# Patient Record
Sex: MALE | Race: BLACK OR AFRICAN AMERICAN | NOT HISPANIC OR LATINO
[De-identification: names, ages, dates, MRNs, and addresses within clinical notes are randomized per-mention and may not be internally consistent; named-entity substitution may affect disease eponyms.]

---

## 2017-01-31 ENCOUNTER — APPOINTMENT (OUTPATIENT)
Dept: FAMILY MEDICINE | Facility: CLINIC | Age: 37
End: 2017-01-31

## 2017-01-31 VITALS
TEMPERATURE: 98.5 F | HEART RATE: 121 BPM | HEIGHT: 76 IN | DIASTOLIC BLOOD PRESSURE: 76 MMHG | OXYGEN SATURATION: 98 % | RESPIRATION RATE: 12 BRPM | SYSTOLIC BLOOD PRESSURE: 132 MMHG

## 2017-02-11 ENCOUNTER — APPOINTMENT (OUTPATIENT)
Dept: FAMILY MEDICINE | Facility: CLINIC | Age: 37
End: 2017-02-11

## 2017-02-11 ENCOUNTER — LABORATORY RESULT (OUTPATIENT)
Age: 37
End: 2017-02-11

## 2017-02-11 VITALS
RESPIRATION RATE: 14 BRPM | DIASTOLIC BLOOD PRESSURE: 80 MMHG | HEART RATE: 100 BPM | OXYGEN SATURATION: 96 % | SYSTOLIC BLOOD PRESSURE: 132 MMHG | HEIGHT: 76 IN

## 2017-02-13 LAB
25(OH)D3 SERPL-MCNC: 7.4 NG/ML
ALBUMIN SERPL ELPH-MCNC: 4 G/DL
ALP BLD-CCNC: 93 U/L
ALT SERPL-CCNC: 27 U/L
ANION GAP SERPL CALC-SCNC: 14 MMOL/L
APPEARANCE: CLEAR
AST SERPL-CCNC: 16 U/L
BASOPHILS # BLD AUTO: 0.03 K/UL
BASOPHILS NFR BLD AUTO: 0.2 %
BILIRUB SERPL-MCNC: 0.3 MG/DL
BILIRUBIN URINE: NEGATIVE
BLOOD URINE: NEGATIVE
BUN SERPL-MCNC: 14 MG/DL
C PEPTIDE SERPL-MCNC: 4.5 NG/ML
CALCIUM SERPL-MCNC: 9.6 MG/DL
CHLORIDE SERPL-SCNC: 99 MMOL/L
CHOLEST SERPL-MCNC: 165 MG/DL
CHOLEST/HDLC SERPL: 4.7 RATIO
CO2 SERPL-SCNC: 27 MMOL/L
COLOR: YELLOW
CREAT SERPL-MCNC: 0.95 MG/DL
EOSINOPHIL # BLD AUTO: 0.08 K/UL
EOSINOPHIL NFR BLD AUTO: 0.7 %
GLUCOSE QUALITATIVE U: NORMAL MG/DL
GLUCOSE SERPL-MCNC: 96 MG/DL
HBA1C MFR BLD HPLC: 6.3 %
HCT VFR BLD CALC: 43.8 %
HDLC SERPL-MCNC: 35 MG/DL
HGB BLD-MCNC: 13.4 G/DL
IMM GRANULOCYTES NFR BLD AUTO: 0.6 %
KETONES URINE: NEGATIVE
LDLC SERPL CALC-MCNC: 115 MG/DL
LEUKOCYTE ESTERASE URINE: ABNORMAL
LYMPHOCYTES # BLD AUTO: 1.9 K/UL
LYMPHOCYTES NFR BLD AUTO: 15.8 %
MAN DIFF?: NORMAL
MCHC RBC-ENTMCNC: 24 PG
MCHC RBC-ENTMCNC: 30.6 GM/DL
MCV RBC AUTO: 78.4 FL
MONOCYTES # BLD AUTO: 0.58 K/UL
MONOCYTES NFR BLD AUTO: 4.8 %
NEUTROPHILS # BLD AUTO: 9.35 K/UL
NEUTROPHILS NFR BLD AUTO: 77.9 %
NITRITE URINE: NEGATIVE
PH URINE: 6.5
PLATELET # BLD AUTO: 312 K/UL
POTASSIUM SERPL-SCNC: 4.6 MMOL/L
PROT SERPL-MCNC: 8.1 G/DL
PROTEIN URINE: NEGATIVE MG/DL
PSA SERPL-MCNC: 0.2 NG/ML
RBC # BLD: 5.59 M/UL
RBC # FLD: 16.8 %
SODIUM SERPL-SCNC: 140 MMOL/L
SPECIFIC GRAVITY URINE: 1.02
T4 FREE SERPL-MCNC: 1 NG/DL
T4 SERPL-MCNC: 7.2 UG/DL
TRIGL SERPL-MCNC: 74 MG/DL
TSH SERPL-ACNC: 1.27 UIU/ML
UROBILINOGEN URINE: NORMAL MG/DL
WBC # FLD AUTO: 12.01 K/UL

## 2017-02-25 ENCOUNTER — APPOINTMENT (OUTPATIENT)
Dept: FAMILY MEDICINE | Facility: CLINIC | Age: 37
End: 2017-02-25

## 2017-02-25 VITALS
HEART RATE: 112 BPM | SYSTOLIC BLOOD PRESSURE: 130 MMHG | RESPIRATION RATE: 13 BRPM | DIASTOLIC BLOOD PRESSURE: 76 MMHG | HEIGHT: 76 IN | OXYGEN SATURATION: 97 %

## 2017-02-27 VITALS — BODY MASS INDEX: 47.35 KG/M2 | WEIGHT: 315 LBS

## 2017-04-24 ENCOUNTER — OUTPATIENT (OUTPATIENT)
Dept: OUTPATIENT SERVICES | Facility: HOSPITAL | Age: 37
LOS: 1 days | End: 2017-04-24
Payer: COMMERCIAL

## 2017-04-24 DIAGNOSIS — G47.33 OBSTRUCTIVE SLEEP APNEA (ADULT) (PEDIATRIC): ICD-10-CM

## 2017-04-24 DIAGNOSIS — G47.30 SLEEP APNEA, UNSPECIFIED: ICD-10-CM

## 2017-04-24 PROCEDURE — 95811 POLYSOM 6/>YRS CPAP 4/> PARM: CPT | Mod: 26

## 2017-04-24 PROCEDURE — 95811 POLYSOM 6/>YRS CPAP 4/> PARM: CPT

## 2017-06-17 ENCOUNTER — APPOINTMENT (OUTPATIENT)
Dept: FAMILY MEDICINE | Facility: CLINIC | Age: 37
End: 2017-06-17

## 2017-06-17 VITALS
HEART RATE: 107 BPM | HEIGHT: 76 IN | BODY MASS INDEX: 38.36 KG/M2 | OXYGEN SATURATION: 91 % | WEIGHT: 315 LBS | DIASTOLIC BLOOD PRESSURE: 76 MMHG | RESPIRATION RATE: 13 BRPM | SYSTOLIC BLOOD PRESSURE: 126 MMHG

## 2017-07-05 ENCOUNTER — APPOINTMENT (OUTPATIENT)
Dept: PULMONOLOGY | Facility: CLINIC | Age: 37
End: 2017-07-05

## 2017-07-06 ENCOUNTER — APPOINTMENT (OUTPATIENT)
Dept: PULMONOLOGY | Facility: CLINIC | Age: 37
End: 2017-07-06

## 2017-07-06 RX ORDER — AZITHROMYCIN 250 MG/1
250 TABLET, FILM COATED ORAL
Qty: 1 | Refills: 1 | Status: DISCONTINUED | COMMUNITY
Start: 2017-02-25 | End: 2017-07-06

## 2017-10-21 ENCOUNTER — APPOINTMENT (OUTPATIENT)
Dept: FAMILY MEDICINE | Facility: CLINIC | Age: 37
End: 2017-10-21
Payer: COMMERCIAL

## 2017-10-21 VITALS
HEIGHT: 76 IN | DIASTOLIC BLOOD PRESSURE: 76 MMHG | OXYGEN SATURATION: 92 % | SYSTOLIC BLOOD PRESSURE: 124 MMHG | BODY MASS INDEX: 38.36 KG/M2 | WEIGHT: 315 LBS | HEART RATE: 106 BPM | RESPIRATION RATE: 13 BRPM

## 2017-10-21 PROCEDURE — 36415 COLL VENOUS BLD VENIPUNCTURE: CPT

## 2017-10-21 PROCEDURE — 99214 OFFICE O/P EST MOD 30 MIN: CPT | Mod: 25

## 2017-10-23 LAB
25(OH)D3 SERPL-MCNC: 10.9 NG/ML
ALBUMIN SERPL ELPH-MCNC: 4.1 G/DL
ALP BLD-CCNC: 91 U/L
ALT SERPL-CCNC: 35 U/L
ANION GAP SERPL CALC-SCNC: 15 MMOL/L
AST SERPL-CCNC: 18 U/L
BASOPHILS # BLD AUTO: 0.03 K/UL
BASOPHILS NFR BLD AUTO: 0.2 %
BILIRUB SERPL-MCNC: 0.3 MG/DL
BUN SERPL-MCNC: 19 MG/DL
C PEPTIDE SERPL-MCNC: 12.2 NG/ML
CALCIUM SERPL-MCNC: 9.8 MG/DL
CHLORIDE SERPL-SCNC: 98 MMOL/L
CHOLEST SERPL-MCNC: 157 MG/DL
CHOLEST/HDLC SERPL: 5.1 RATIO
CO2 SERPL-SCNC: 28 MMOL/L
CREAT SERPL-MCNC: 1.43 MG/DL
EOSINOPHIL # BLD AUTO: 0.14 K/UL
EOSINOPHIL NFR BLD AUTO: 1.2 %
GLUCOSE SERPL-MCNC: 116 MG/DL
HBA1C MFR BLD HPLC: 6.1 %
HCT VFR BLD CALC: 41.6 %
HDLC SERPL-MCNC: 31 MG/DL
HGB BLD-MCNC: 13.3 G/DL
IMM GRANULOCYTES NFR BLD AUTO: 0.3 %
LDLC SERPL CALC-MCNC: 90 MG/DL
LYMPHOCYTES # BLD AUTO: 2.05 K/UL
LYMPHOCYTES NFR BLD AUTO: 16.9 %
MAN DIFF?: NORMAL
MCHC RBC-ENTMCNC: 25.6 PG
MCHC RBC-ENTMCNC: 32 GM/DL
MCV RBC AUTO: 80 FL
MONOCYTES # BLD AUTO: 0.55 K/UL
MONOCYTES NFR BLD AUTO: 4.5 %
NEUTROPHILS # BLD AUTO: 9.32 K/UL
NEUTROPHILS NFR BLD AUTO: 76.9 %
PLATELET # BLD AUTO: 280 K/UL
POTASSIUM SERPL-SCNC: 4.6 MMOL/L
PROT SERPL-MCNC: 8.3 G/DL
RBC # BLD: 5.2 M/UL
RBC # FLD: 15.7 %
SODIUM SERPL-SCNC: 141 MMOL/L
TRIGL SERPL-MCNC: 182 MG/DL
WBC # FLD AUTO: 12.13 K/UL

## 2017-11-04 ENCOUNTER — APPOINTMENT (OUTPATIENT)
Dept: FAMILY MEDICINE | Facility: CLINIC | Age: 37
End: 2017-11-04
Payer: COMMERCIAL

## 2017-11-04 VITALS
BODY MASS INDEX: 38.36 KG/M2 | WEIGHT: 315 LBS | HEIGHT: 76 IN | DIASTOLIC BLOOD PRESSURE: 78 MMHG | RESPIRATION RATE: 11 BRPM | HEART RATE: 116 BPM | TEMPERATURE: 98.2 F | SYSTOLIC BLOOD PRESSURE: 126 MMHG | OXYGEN SATURATION: 94 %

## 2017-11-04 PROCEDURE — 99214 OFFICE O/P EST MOD 30 MIN: CPT

## 2018-02-06 ENCOUNTER — APPOINTMENT (OUTPATIENT)
Dept: BARIATRICS | Facility: CLINIC | Age: 38
End: 2018-02-06
Payer: COMMERCIAL

## 2018-02-06 VITALS
DIASTOLIC BLOOD PRESSURE: 89 MMHG | BODY MASS INDEX: 38.36 KG/M2 | WEIGHT: 315 LBS | SYSTOLIC BLOOD PRESSURE: 142 MMHG | HEIGHT: 76 IN | HEART RATE: 106 BPM

## 2018-02-06 DIAGNOSIS — E66.3 OVERWEIGHT: ICD-10-CM

## 2018-02-06 PROCEDURE — 99243 OFF/OP CNSLTJ NEW/EST LOW 30: CPT

## 2018-02-24 ENCOUNTER — APPOINTMENT (OUTPATIENT)
Dept: FAMILY MEDICINE | Facility: CLINIC | Age: 38
End: 2018-02-24
Payer: COMMERCIAL

## 2018-02-24 ENCOUNTER — LABORATORY RESULT (OUTPATIENT)
Age: 38
End: 2018-02-24

## 2018-02-24 VITALS
BODY MASS INDEX: 38.36 KG/M2 | TEMPERATURE: 97.6 F | OXYGEN SATURATION: 97 % | WEIGHT: 315 LBS | SYSTOLIC BLOOD PRESSURE: 130 MMHG | HEART RATE: 89 BPM | HEIGHT: 76 IN | RESPIRATION RATE: 12 BRPM | DIASTOLIC BLOOD PRESSURE: 70 MMHG

## 2018-02-24 DIAGNOSIS — Z87.09 PERSONAL HISTORY OF OTHER DISEASES OF THE RESPIRATORY SYSTEM: ICD-10-CM

## 2018-02-24 DIAGNOSIS — Z87.898 PERSONAL HISTORY OF OTHER SPECIFIED CONDITIONS: ICD-10-CM

## 2018-02-24 DIAGNOSIS — M25.562 PAIN IN RIGHT KNEE: ICD-10-CM

## 2018-02-24 DIAGNOSIS — Z01.818 ENCOUNTER FOR OTHER PREPROCEDURAL EXAMINATION: ICD-10-CM

## 2018-02-24 DIAGNOSIS — M25.561 PAIN IN RIGHT KNEE: ICD-10-CM

## 2018-02-24 PROCEDURE — 36415 COLL VENOUS BLD VENIPUNCTURE: CPT

## 2018-02-24 PROCEDURE — 99214 OFFICE O/P EST MOD 30 MIN: CPT | Mod: 25

## 2018-02-26 LAB
25(OH)D3 SERPL-MCNC: 23.1 NG/ML
ALBUMIN SERPL ELPH-MCNC: 4.1 G/DL
ALP BLD-CCNC: 73 U/L
ALT SERPL-CCNC: 29 U/L
ANION GAP SERPL CALC-SCNC: 13 MMOL/L
APPEARANCE: CLEAR
AST SERPL-CCNC: 16 U/L
BASOPHILS # BLD AUTO: 0.03 K/UL
BASOPHILS NFR BLD AUTO: 0.3 %
BILIRUB SERPL-MCNC: 0.5 MG/DL
BILIRUBIN URINE: NEGATIVE
BLOOD URINE: NEGATIVE
BUN SERPL-MCNC: 17 MG/DL
C PEPTIDE SERPL-MCNC: 3.1 NG/ML
CALCIUM SERPL-MCNC: 9.6 MG/DL
CHLORIDE SERPL-SCNC: 96 MMOL/L
CHOLEST SERPL-MCNC: 159 MG/DL
CHOLEST/HDLC SERPL: 4.7 RATIO
CO2 SERPL-SCNC: 28 MMOL/L
COLOR: YELLOW
CREAT SERPL-MCNC: 1.02 MG/DL
EOSINOPHIL # BLD AUTO: 0.06 K/UL
EOSINOPHIL NFR BLD AUTO: 0.6 %
GLUCOSE QUALITATIVE U: NEGATIVE MG/DL
GLUCOSE SERPL-MCNC: 85 MG/DL
HBA1C MFR BLD HPLC: 5.9 %
HCT VFR BLD CALC: 40.8 %
HDLC SERPL-MCNC: 34 MG/DL
HGB BLD-MCNC: 12.9 G/DL
IMM GRANULOCYTES NFR BLD AUTO: 0.2 %
KETONES URINE: NEGATIVE
LDLC SERPL CALC-MCNC: 106 MG/DL
LEUKOCYTE ESTERASE URINE: ABNORMAL
LYMPHOCYTES # BLD AUTO: 2.24 K/UL
LYMPHOCYTES NFR BLD AUTO: 21.3 %
MAN DIFF?: NORMAL
MCHC RBC-ENTMCNC: 25.1 PG
MCHC RBC-ENTMCNC: 31.6 GM/DL
MCV RBC AUTO: 79.4 FL
MONOCYTES # BLD AUTO: 0.7 K/UL
MONOCYTES NFR BLD AUTO: 6.7 %
NEUTROPHILS # BLD AUTO: 7.45 K/UL
NEUTROPHILS NFR BLD AUTO: 70.9 %
NITRITE URINE: NEGATIVE
PH URINE: 6
PLATELET # BLD AUTO: 292 K/UL
POTASSIUM SERPL-SCNC: 4.5 MMOL/L
PROT SERPL-MCNC: 8.5 G/DL
PROTEIN URINE: NEGATIVE MG/DL
RBC # BLD: 5.14 M/UL
RBC # FLD: 16.3 %
SODIUM SERPL-SCNC: 137 MMOL/L
SPECIFIC GRAVITY URINE: 1.02
TRIGL SERPL-MCNC: 94 MG/DL
UROBILINOGEN URINE: NEGATIVE MG/DL
WBC # FLD AUTO: 10.5 K/UL

## 2018-03-24 ENCOUNTER — APPOINTMENT (OUTPATIENT)
Dept: FAMILY MEDICINE | Facility: CLINIC | Age: 38
End: 2018-03-24
Payer: COMMERCIAL

## 2018-03-24 VITALS
SYSTOLIC BLOOD PRESSURE: 122 MMHG | RESPIRATION RATE: 12 BRPM | HEIGHT: 76 IN | TEMPERATURE: 98.2 F | HEART RATE: 88 BPM | BODY MASS INDEX: 38.36 KG/M2 | WEIGHT: 315 LBS | DIASTOLIC BLOOD PRESSURE: 76 MMHG | OXYGEN SATURATION: 95 %

## 2018-03-24 PROCEDURE — 99214 OFFICE O/P EST MOD 30 MIN: CPT

## 2018-04-27 ENCOUNTER — APPOINTMENT (OUTPATIENT)
Dept: BARIATRICS/WEIGHT MGMT | Facility: CLINIC | Age: 38
End: 2018-04-27
Payer: SELF-PAY

## 2018-04-27 VITALS — HEIGHT: 76 IN | WEIGHT: 315 LBS | BODY MASS INDEX: 38.36 KG/M2

## 2018-04-27 PROCEDURE — 97802 MEDICAL NUTRITION INDIV IN: CPT

## 2018-05-03 ENCOUNTER — APPOINTMENT (OUTPATIENT)
Dept: BARIATRICS/WEIGHT MGMT | Facility: CLINIC | Age: 38
End: 2018-05-03
Payer: COMMERCIAL

## 2018-05-03 ENCOUNTER — APPOINTMENT (OUTPATIENT)
Dept: BARIATRICS | Facility: CLINIC | Age: 38
End: 2018-05-03
Payer: COMMERCIAL

## 2018-05-03 VITALS
HEIGHT: 76 IN | BODY MASS INDEX: 38.36 KG/M2 | SYSTOLIC BLOOD PRESSURE: 128 MMHG | OXYGEN SATURATION: 97 % | HEART RATE: 120 BPM | DIASTOLIC BLOOD PRESSURE: 90 MMHG | WEIGHT: 315 LBS

## 2018-05-03 DIAGNOSIS — Z78.9 OTHER SPECIFIED HEALTH STATUS: ICD-10-CM

## 2018-05-03 PROCEDURE — 99214 OFFICE O/P EST MOD 30 MIN: CPT

## 2018-05-03 PROCEDURE — 90791 PSYCH DIAGNOSTIC EVALUATION: CPT

## 2018-05-29 ENCOUNTER — APPOINTMENT (OUTPATIENT)
Dept: BARIATRICS/WEIGHT MGMT | Facility: CLINIC | Age: 38
End: 2018-05-29
Payer: SELF-PAY

## 2018-05-29 VITALS — BODY MASS INDEX: 38.36 KG/M2 | WEIGHT: 315 LBS | HEIGHT: 76 IN

## 2018-05-29 PROCEDURE — 97803 MED NUTRITION INDIV SUBSEQ: CPT

## 2018-06-19 RX ORDER — DAPAGLIFLOZIN 5 MG/1
5 TABLET, FILM COATED ORAL
Qty: 90 | Refills: 3 | Status: COMPLETED | COMMUNITY
Start: 2018-03-24 | End: 2018-06-19

## 2018-06-29 ENCOUNTER — APPOINTMENT (OUTPATIENT)
Dept: BARIATRICS/WEIGHT MGMT | Facility: CLINIC | Age: 38
End: 2018-06-29
Payer: SELF-PAY

## 2018-06-29 VITALS — BODY MASS INDEX: 38.36 KG/M2 | HEIGHT: 76 IN | WEIGHT: 315 LBS

## 2018-06-29 PROCEDURE — 97803 MED NUTRITION INDIV SUBSEQ: CPT

## 2018-07-24 ENCOUNTER — APPOINTMENT (OUTPATIENT)
Dept: BARIATRICS/WEIGHT MGMT | Facility: CLINIC | Age: 38
End: 2018-07-24
Payer: SELF-PAY

## 2018-07-24 PROCEDURE — 97803 MED NUTRITION INDIV SUBSEQ: CPT

## 2018-07-26 VITALS — HEIGHT: 76 IN | BODY MASS INDEX: 38.36 KG/M2 | WEIGHT: 315 LBS

## 2018-10-09 ENCOUNTER — APPOINTMENT (OUTPATIENT)
Dept: BARIATRICS/WEIGHT MGMT | Facility: CLINIC | Age: 38
End: 2018-10-09
Payer: SELF-PAY

## 2018-10-09 VITALS — BODY MASS INDEX: 38.36 KG/M2 | WEIGHT: 315 LBS | HEIGHT: 76 IN

## 2018-10-09 PROCEDURE — 97803 MED NUTRITION INDIV SUBSEQ: CPT

## 2018-10-15 ENCOUNTER — APPOINTMENT (OUTPATIENT)
Dept: BARIATRICS/WEIGHT MGMT | Facility: CLINIC | Age: 38
End: 2018-10-15
Payer: COMMERCIAL

## 2018-10-15 ENCOUNTER — NON-APPOINTMENT (OUTPATIENT)
Age: 38
End: 2018-10-15

## 2018-10-15 VITALS
WEIGHT: 315 LBS | SYSTOLIC BLOOD PRESSURE: 140 MMHG | HEIGHT: 76 IN | DIASTOLIC BLOOD PRESSURE: 92 MMHG | HEART RATE: 110 BPM | BODY MASS INDEX: 38.36 KG/M2 | OXYGEN SATURATION: 93 %

## 2018-10-15 PROCEDURE — 93000 ELECTROCARDIOGRAM COMPLETE: CPT

## 2018-10-15 PROCEDURE — 94690 O2 UPTK REST INDIRECT: CPT

## 2018-10-15 PROCEDURE — 99205 OFFICE O/P NEW HI 60 MIN: CPT | Mod: 25

## 2018-10-16 ENCOUNTER — MEDICATION RENEWAL (OUTPATIENT)
Age: 38
End: 2018-10-16

## 2018-10-16 RX ORDER — PIOGLITAZONE HYDROCHLORIDE 15 MG/1
15 TABLET ORAL
Qty: 90 | Refills: 3 | Status: DISCONTINUED | COMMUNITY
Start: 2017-02-25 | End: 2018-10-16

## 2018-10-27 ENCOUNTER — APPOINTMENT (OUTPATIENT)
Dept: FAMILY MEDICINE | Facility: CLINIC | Age: 38
End: 2018-10-27

## 2018-10-31 ENCOUNTER — APPOINTMENT (OUTPATIENT)
Dept: BARIATRICS/WEIGHT MGMT | Facility: CLINIC | Age: 38
End: 2018-10-31
Payer: COMMERCIAL

## 2018-10-31 VITALS
HEART RATE: 118 BPM | HEIGHT: 76 IN | SYSTOLIC BLOOD PRESSURE: 156 MMHG | OXYGEN SATURATION: 96 % | DIASTOLIC BLOOD PRESSURE: 100 MMHG | WEIGHT: 315 LBS | BODY MASS INDEX: 38.36 KG/M2

## 2018-10-31 PROCEDURE — 99214 OFFICE O/P EST MOD 30 MIN: CPT

## 2018-11-05 ENCOUNTER — APPOINTMENT (OUTPATIENT)
Dept: FAMILY MEDICINE | Facility: CLINIC | Age: 38
End: 2018-11-05
Payer: COMMERCIAL

## 2018-11-05 VITALS
OXYGEN SATURATION: 98 % | BODY MASS INDEX: 38.36 KG/M2 | DIASTOLIC BLOOD PRESSURE: 76 MMHG | WEIGHT: 315 LBS | SYSTOLIC BLOOD PRESSURE: 136 MMHG | RESPIRATION RATE: 12 BRPM | HEART RATE: 110 BPM | HEIGHT: 76 IN | TEMPERATURE: 97.7 F

## 2018-11-05 PROCEDURE — 36415 COLL VENOUS BLD VENIPUNCTURE: CPT

## 2018-11-05 PROCEDURE — 99214 OFFICE O/P EST MOD 30 MIN: CPT | Mod: 25

## 2018-11-06 LAB
25(OH)D3 SERPL-MCNC: 20 NG/ML
ALBUMIN SERPL ELPH-MCNC: 4.1 G/DL
ALP BLD-CCNC: 79 U/L
ALT SERPL-CCNC: 29 U/L
ANION GAP SERPL CALC-SCNC: 12 MMOL/L
AST SERPL-CCNC: 20 U/L
BASOPHILS # BLD AUTO: 0.04 K/UL
BASOPHILS NFR BLD AUTO: 0.3 %
BILIRUB SERPL-MCNC: 0.2 MG/DL
BUN SERPL-MCNC: 14 MG/DL
C PEPTIDE SERPL-MCNC: 8.1 NG/ML
CALCIUM SERPL-MCNC: 9.2 MG/DL
CHLORIDE SERPL-SCNC: 99 MMOL/L
CHOLEST SERPL-MCNC: 166 MG/DL
CHOLEST/HDLC SERPL: 4.7 RATIO
CO2 SERPL-SCNC: 28 MMOL/L
CREAT SERPL-MCNC: 0.96 MG/DL
EOSINOPHIL # BLD AUTO: 0.17 K/UL
EOSINOPHIL NFR BLD AUTO: 1.5 %
GLUCOSE SERPL-MCNC: 101 MG/DL
HBA1C MFR BLD HPLC: 6 %
HCT VFR BLD CALC: 42.9 %
HDLC SERPL-MCNC: 35 MG/DL
HGB BLD-MCNC: 13.3 G/DL
IMM GRANULOCYTES NFR BLD AUTO: 0.3 %
IRON SATN MFR SERPL: 15 %
IRON SERPL-MCNC: 42 UG/DL
LDLC SERPL CALC-MCNC: 108 MG/DL
LYMPHOCYTES # BLD AUTO: 2.35 K/UL
LYMPHOCYTES NFR BLD AUTO: 20.4 %
MAN DIFF?: NORMAL
MCHC RBC-ENTMCNC: 25.2 PG
MCHC RBC-ENTMCNC: 31 GM/DL
MCV RBC AUTO: 81.3 FL
MONOCYTES # BLD AUTO: 0.62 K/UL
MONOCYTES NFR BLD AUTO: 5.4 %
NEUTROPHILS # BLD AUTO: 8.32 K/UL
NEUTROPHILS NFR BLD AUTO: 72.1 %
PLATELET # BLD AUTO: 295 K/UL
POTASSIUM SERPL-SCNC: 4.3 MMOL/L
PROT SERPL-MCNC: 7.9 G/DL
RBC # BLD: 5.28 M/UL
RBC # FLD: 16.4 %
SODIUM SERPL-SCNC: 139 MMOL/L
TIBC SERPL-MCNC: 277 UG/DL
TRIGL SERPL-MCNC: 115 MG/DL
UIBC SERPL-MCNC: 235 UG/DL
WBC # FLD AUTO: 11.53 K/UL

## 2018-11-08 LAB
FERRITIN SERPL-MCNC: 157 NG/ML
FOLATE SERPL-MCNC: 5.3 NG/ML
T4 SERPL-MCNC: 6.5 UG/DL
TSH SERPL-ACNC: 2.33 UIU/ML
VIT B12 SERPL-MCNC: 904 PG/ML

## 2018-11-12 ENCOUNTER — APPOINTMENT (OUTPATIENT)
Dept: FAMILY MEDICINE | Facility: CLINIC | Age: 38
End: 2018-11-12
Payer: COMMERCIAL

## 2018-11-12 VITALS
DIASTOLIC BLOOD PRESSURE: 70 MMHG | HEART RATE: 109 BPM | SYSTOLIC BLOOD PRESSURE: 110 MMHG | OXYGEN SATURATION: 92 % | TEMPERATURE: 98.9 F | WEIGHT: 315 LBS | RESPIRATION RATE: 14 BRPM | BODY MASS INDEX: 38.36 KG/M2 | HEIGHT: 76 IN

## 2018-11-12 PROCEDURE — 99214 OFFICE O/P EST MOD 30 MIN: CPT

## 2018-11-12 RX ORDER — DULAGLUTIDE 0.75 MG/.5ML
0.75 INJECTION, SOLUTION SUBCUTANEOUS
Qty: 1 | Refills: 3 | Status: COMPLETED | COMMUNITY
Start: 2018-10-16 | End: 2018-11-12

## 2018-12-03 ENCOUNTER — APPOINTMENT (OUTPATIENT)
Dept: BARIATRICS/WEIGHT MGMT | Facility: CLINIC | Age: 38
End: 2018-12-03
Payer: COMMERCIAL

## 2018-12-03 VITALS
BODY MASS INDEX: 38.36 KG/M2 | SYSTOLIC BLOOD PRESSURE: 142 MMHG | DIASTOLIC BLOOD PRESSURE: 94 MMHG | HEART RATE: 134 BPM | OXYGEN SATURATION: 97 % | HEIGHT: 76 IN | WEIGHT: 315 LBS

## 2018-12-03 PROCEDURE — 99214 OFFICE O/P EST MOD 30 MIN: CPT

## 2018-12-07 ENCOUNTER — APPOINTMENT (OUTPATIENT)
Dept: BARIATRICS/WEIGHT MGMT | Facility: CLINIC | Age: 38
End: 2018-12-07
Payer: SELF-PAY

## 2018-12-07 VITALS — HEIGHT: 76 IN | BODY MASS INDEX: 38.36 KG/M2 | WEIGHT: 315 LBS

## 2018-12-07 PROCEDURE — 97803 MED NUTRITION INDIV SUBSEQ: CPT

## 2018-12-17 ENCOUNTER — APPOINTMENT (OUTPATIENT)
Dept: BARIATRICS/WEIGHT MGMT | Facility: CLINIC | Age: 38
End: 2018-12-17
Payer: COMMERCIAL

## 2018-12-17 VITALS
OXYGEN SATURATION: 94 % | HEIGHT: 76 IN | BODY MASS INDEX: 38.36 KG/M2 | DIASTOLIC BLOOD PRESSURE: 90 MMHG | SYSTOLIC BLOOD PRESSURE: 140 MMHG | WEIGHT: 315 LBS | HEART RATE: 104 BPM

## 2018-12-17 PROCEDURE — 99214 OFFICE O/P EST MOD 30 MIN: CPT

## 2019-01-14 ENCOUNTER — APPOINTMENT (OUTPATIENT)
Dept: BARIATRICS/WEIGHT MGMT | Facility: CLINIC | Age: 39
End: 2019-01-14
Payer: COMMERCIAL

## 2019-01-14 VITALS
BODY MASS INDEX: 38.36 KG/M2 | DIASTOLIC BLOOD PRESSURE: 92 MMHG | HEIGHT: 76 IN | WEIGHT: 315 LBS | SYSTOLIC BLOOD PRESSURE: 126 MMHG | OXYGEN SATURATION: 94 % | HEART RATE: 98 BPM

## 2019-01-14 PROCEDURE — 99214 OFFICE O/P EST MOD 30 MIN: CPT

## 2019-01-14 NOTE — ASSESSMENT
[FreeTextEntry1] : 37 yo M presents for weight managemnet f/u.\par \par Plan:\par Continue high fiber diet,  limiting simple carbohydrates\par Continue healthy sleep habits getting >6hr and nightly use of CPAP\par Increase activity to 10K steps daily. Join gym, plan is to go 3 days a week \par \par RTC 4 wk. \par

## 2019-01-14 NOTE — HISTORY OF PRESENT ILLNESS
[FreeTextEntry1] : This is a 38 year old male  present in office today for followup visit. \par \par Patient lost 13 pounds since last visit. He was in [place] for 3 weeks where he was able to wear his CPAP machine regularly and sleep more. He does not feel he can mimic while back in [place]. He plans to try  to use his mask more regularly, issue is he falls asleep while watching tv in bed before putting his mask on. \par \par While in [place] he was having more regular meals, but still skipping lunch. he has cut down his bread intake. Continues to take Trulicity, tolerating well. If he eats too much, he does not feel well\par \par He plans to join a gym this week

## 2019-01-14 NOTE — PHYSICAL EXAM
[Obese, well nourished, in no acute distress] : obese, well nourished, in no acute distress [Normal] : RRR, normal S1S2, no murmurs, rubs, gallops

## 2019-02-04 ENCOUNTER — APPOINTMENT (OUTPATIENT)
Dept: BARIATRICS/WEIGHT MGMT | Facility: CLINIC | Age: 39
End: 2019-02-04
Payer: COMMERCIAL

## 2019-02-04 VITALS
DIASTOLIC BLOOD PRESSURE: 90 MMHG | SYSTOLIC BLOOD PRESSURE: 140 MMHG | HEART RATE: 103 BPM | BODY MASS INDEX: 38.36 KG/M2 | HEIGHT: 76 IN | OXYGEN SATURATION: 96 % | WEIGHT: 315 LBS

## 2019-02-04 PROCEDURE — 99214 OFFICE O/P EST MOD 30 MIN: CPT

## 2019-02-05 ENCOUNTER — RX RENEWAL (OUTPATIENT)
Age: 39
End: 2019-02-05

## 2019-02-05 NOTE — HISTORY OF PRESENT ILLNESS
[FreeTextEntry1] : 39yo M w/ BMI 70 and MADELYN w/ CPAP use presents to f/u for weight management.\par \par Pt w/ 7lb weight gain after losing 13lb during a trip to [place] w/ family where meals were more regulated.\par He cont to take Trulicity 1.5mg w/ helps prevent overeating.\par He conts to use CPAP nightly.\par Has not yet joined a gym.  8K-10K steps daily.\par Mostly skipping breakfast or will have ham, egg and cheese sandwich.\par Tuna, crackers and campbell for lunch.\par Dinner about 6:30pm having meat protein (sometimes extra portions), starch and vegetables.\par Also started back drinking soda for taste, occasional cookies for after dinner snack.\par \par

## 2019-02-05 NOTE — PHYSICAL EXAM
[Obese] : obese [Normal] : affect appropriate [de-identified] : large neck circumference [de-identified] : walking w/ limp to left side

## 2019-02-05 NOTE — ASSESSMENT
[FreeTextEntry1] : 39yo M w/ BMI 70 and MADELYN w/ CPAP use presents to f/u for weight management.\par \par Pt w/ 7lb weight gain after losing 13lb during a trip to [place] w/ family where meals were more regulated.\par \par Plan:\par Discussed increasing fiber having extra vegetables instead of extra servings of meat or pasta\par Avoid soda increasing water or having seltzer w/ fruit for taste.\par Will start Locaserin 20mg ER, cont Trulicity 1.5mg\par Pt to cont walking daily w/ goal of 10K steps and plan to increase daily exercise joining the gym\par \par RTC 4 wk

## 2019-02-14 ENCOUNTER — APPOINTMENT (OUTPATIENT)
Dept: BARIATRICS/WEIGHT MGMT | Facility: CLINIC | Age: 39
End: 2019-02-14

## 2019-02-28 ENCOUNTER — RX RENEWAL (OUTPATIENT)
Age: 39
End: 2019-02-28

## 2019-03-25 ENCOUNTER — APPOINTMENT (OUTPATIENT)
Dept: BARIATRICS/WEIGHT MGMT | Facility: CLINIC | Age: 39
End: 2019-03-25
Payer: COMMERCIAL

## 2019-03-25 VITALS
HEIGHT: 76 IN | HEART RATE: 108 BPM | WEIGHT: 315 LBS | SYSTOLIC BLOOD PRESSURE: 140 MMHG | OXYGEN SATURATION: 97 % | DIASTOLIC BLOOD PRESSURE: 90 MMHG | BODY MASS INDEX: 38.36 KG/M2

## 2019-03-25 PROCEDURE — 99214 OFFICE O/P EST MOD 30 MIN: CPT

## 2019-03-25 RX ORDER — FOLIC ACID 1 MG/1
1 TABLET ORAL DAILY
Qty: 90 | Refills: 3 | Status: DISCONTINUED | COMMUNITY
Start: 2018-11-12 | End: 2019-03-25

## 2019-03-25 NOTE — ASSESSMENT
[FreeTextEntry1] : 39yo M w/ BMI 70 and MADELYN w/ CPAP use presents to f/u for weight management.\par \par Plan:\par Encouraged more of a whole food plan, avoid eating fast food\par Avoid soda increasing water or having seltzer \par Cont Locaserin 20mg ER, cont Trulicity 1.5mg\par Pt to cont walking daily w/ goal of 10K steps and return to gym \par \par RTO 3 wk

## 2019-03-25 NOTE — PHYSICAL EXAM
[Obese] : obese [Normal] : affect appropriate [de-identified] : large neck circumference [de-identified] : walking w/ limp to left side

## 2019-03-25 NOTE — HISTORY OF PRESENT ILLNESS
[FreeTextEntry1] : 39yo M w/ BMI 70 and MADELYN w/ CPAP use presents to f/u for weight management.\par \par Pt w/ 18 lb weight loss since last visit. He returned from [place] on Saturday. He feels he might be more active when he is down there. He sleeps adequately when in [place], with CPAP. He has been wearing his CPAP regularly while in [place], but may get 5 hours of sleep. \par \par He cont to take Trulicity 1.5mg and Belviq. He has noticed he is eating less in one sitting. And due to his decreased appetite he may skip meals. Mostly skipping breakfast or will have ham, egg and cheese sandwich. His first meal may be at 2pm, tends to be fast food. \par Has not yet joined a gym but plans to start by April 1. He has a cruise planned for september and wants to lose more weight. \par He plans to have a discussion with his boss regarding his hours, he realizes he needs to for his health. \par \par \par

## 2019-04-06 ENCOUNTER — APPOINTMENT (OUTPATIENT)
Dept: FAMILY MEDICINE | Facility: CLINIC | Age: 39
End: 2019-04-06

## 2019-04-08 ENCOUNTER — APPOINTMENT (OUTPATIENT)
Dept: FAMILY MEDICINE | Facility: CLINIC | Age: 39
End: 2019-04-08
Payer: COMMERCIAL

## 2019-04-08 VITALS
TEMPERATURE: 98 F | HEART RATE: 86 BPM | OXYGEN SATURATION: 96 % | HEIGHT: 76 IN | RESPIRATION RATE: 13 BRPM | WEIGHT: 315 LBS | BODY MASS INDEX: 38.36 KG/M2 | DIASTOLIC BLOOD PRESSURE: 86 MMHG | SYSTOLIC BLOOD PRESSURE: 126 MMHG

## 2019-04-08 PROCEDURE — 36415 COLL VENOUS BLD VENIPUNCTURE: CPT

## 2019-04-08 PROCEDURE — 99214 OFFICE O/P EST MOD 30 MIN: CPT | Mod: 25

## 2019-04-09 LAB
25(OH)D3 SERPL-MCNC: 24.5 NG/ML
ALBUMIN SERPL ELPH-MCNC: 4.4 G/DL
ALP BLD-CCNC: 89 U/L
ALT SERPL-CCNC: 34 U/L
ANION GAP SERPL CALC-SCNC: 14 MMOL/L
AST SERPL-CCNC: 24 U/L
BASOPHILS # BLD AUTO: 0.04 K/UL
BASOPHILS NFR BLD AUTO: 0.3 %
BILIRUB SERPL-MCNC: 0.5 MG/DL
BUN SERPL-MCNC: 13 MG/DL
C PEPTIDE SERPL-MCNC: 3.8 NG/ML
CALCIUM SERPL-MCNC: 9.2 MG/DL
CHLORIDE SERPL-SCNC: 97 MMOL/L
CHOLEST SERPL-MCNC: 159 MG/DL
CHOLEST/HDLC SERPL: 5 RATIO
CO2 SERPL-SCNC: 26 MMOL/L
CREAT SERPL-MCNC: 1 MG/DL
EOSINOPHIL # BLD AUTO: 0.07 K/UL
EOSINOPHIL NFR BLD AUTO: 0.5 %
ESTIMATED AVERAGE GLUCOSE: 117 MG/DL
FERRITIN SERPL-MCNC: 193 NG/ML
FOLATE SERPL-MCNC: >20 NG/ML
GLUCOSE SERPL-MCNC: 92 MG/DL
HBA1C MFR BLD HPLC: 5.7 %
HCT VFR BLD CALC: 44.8 %
HDLC SERPL-MCNC: 32 MG/DL
HGB BLD-MCNC: 13.4 G/DL
IMM GRANULOCYTES NFR BLD AUTO: 0.6 %
IRON SATN MFR SERPL: 18 %
IRON SERPL-MCNC: 51 UG/DL
LDLC SERPL CALC-MCNC: 108 MG/DL
LYMPHOCYTES # BLD AUTO: 2.57 K/UL
LYMPHOCYTES NFR BLD AUTO: 19.7 %
MAN DIFF?: NORMAL
MCHC RBC-ENTMCNC: 24.8 PG
MCHC RBC-ENTMCNC: 29.9 GM/DL
MCV RBC AUTO: 83 FL
MONOCYTES # BLD AUTO: 0.81 K/UL
MONOCYTES NFR BLD AUTO: 6.2 %
NEUTROPHILS # BLD AUTO: 9.46 K/UL
NEUTROPHILS NFR BLD AUTO: 72.7 %
PLATELET # BLD AUTO: 314 K/UL
POTASSIUM SERPL-SCNC: 4.2 MMOL/L
PROT SERPL-MCNC: 8.4 G/DL
PSA SERPL-MCNC: 0.18 NG/ML
RBC # BLD: 5.4 M/UL
RBC # FLD: 16.2 %
SODIUM SERPL-SCNC: 137 MMOL/L
T4 SERPL-MCNC: 7.2 UG/DL
TIBC SERPL-MCNC: 280 UG/DL
TRIGL SERPL-MCNC: 95 MG/DL
TSH SERPL-ACNC: 2.02 UIU/ML
UIBC SERPL-MCNC: 229 UG/DL
VIT B12 SERPL-MCNC: 699 PG/ML
WBC # FLD AUTO: 13.03 K/UL

## 2019-04-11 ENCOUNTER — APPOINTMENT (OUTPATIENT)
Dept: BARIATRICS/WEIGHT MGMT | Facility: CLINIC | Age: 39
End: 2019-04-11
Payer: SELF-PAY

## 2019-04-11 VITALS — WEIGHT: 315 LBS | HEIGHT: 76 IN | BODY MASS INDEX: 38.36 KG/M2

## 2019-04-11 PROCEDURE — 97803 MED NUTRITION INDIV SUBSEQ: CPT

## 2019-04-15 ENCOUNTER — APPOINTMENT (OUTPATIENT)
Dept: BARIATRICS/WEIGHT MGMT | Facility: CLINIC | Age: 39
End: 2019-04-15
Payer: COMMERCIAL

## 2019-04-15 VITALS
HEART RATE: 99 BPM | WEIGHT: 315 LBS | DIASTOLIC BLOOD PRESSURE: 86 MMHG | BODY MASS INDEX: 38.36 KG/M2 | HEIGHT: 76 IN | SYSTOLIC BLOOD PRESSURE: 132 MMHG | OXYGEN SATURATION: 97 %

## 2019-04-15 PROCEDURE — 99214 OFFICE O/P EST MOD 30 MIN: CPT

## 2019-04-15 NOTE — HISTORY OF PRESENT ILLNESS
[FreeTextEntry1] : This is a 38 year old male  present in office today for followup visit. \par \par Patient has lost 4 pounds since last visit. Continues Trulicity and Belviq without difficulty. Reports improved appetite. He tends to eat 2 meals a day, whether it is breakfast and dinner, or lunch and dinner, and states he tends to eat smaller portions now. If he is working, he has been going home for dinner as oppose to eating fast food while he is out. On occasion he will have a cookie or muffin after dinner. He drinks water or unsweetened green tea lemonade from [employer]. \par Patient started going to the gym at the beginning of the month, was able to go for 1 week, he states he was too busy at work last week. \par He wears his CPAP most nights. Often times he goes to bed puts on the tv, and passes out before putting on his mask

## 2019-04-15 NOTE — ASSESSMENT
[FreeTextEntry1] : Plan: \par \par Recommended he eat 3 meals a day, as recommended by dietician. Whole food. Avoid added fat, sugar, refined carbohydrates\par Continue to pack meals, avoid eating out\par Do not snack, especially after dinner\par Discussed exercise plan. Return to gym. Wear fitbit, goal 10,000 steps daily\par Continue current medication regime \par Recommended he follow-up with surgery to discuss weight loss goals for surgery \par \par RTO 3-4 weeks

## 2019-04-29 ENCOUNTER — APPOINTMENT (OUTPATIENT)
Dept: FAMILY MEDICINE | Facility: CLINIC | Age: 39
End: 2019-04-29
Payer: COMMERCIAL

## 2019-04-29 VITALS
DIASTOLIC BLOOD PRESSURE: 74 MMHG | SYSTOLIC BLOOD PRESSURE: 126 MMHG | OXYGEN SATURATION: 98 % | BODY MASS INDEX: 38.36 KG/M2 | RESPIRATION RATE: 13 BRPM | WEIGHT: 315 LBS | HEART RATE: 99 BPM | HEIGHT: 76 IN

## 2019-04-29 PROCEDURE — 99214 OFFICE O/P EST MOD 30 MIN: CPT

## 2019-04-30 ENCOUNTER — APPOINTMENT (OUTPATIENT)
Dept: BARIATRICS | Facility: CLINIC | Age: 39
End: 2019-04-30
Payer: COMMERCIAL

## 2019-04-30 VITALS
DIASTOLIC BLOOD PRESSURE: 80 MMHG | OXYGEN SATURATION: 97 % | HEART RATE: 98 BPM | SYSTOLIC BLOOD PRESSURE: 120 MMHG | WEIGHT: 315 LBS | BODY MASS INDEX: 38.36 KG/M2 | HEIGHT: 76 IN

## 2019-04-30 DIAGNOSIS — E66.01 MORBID (SEVERE) OBESITY DUE TO EXCESS CALORIES: ICD-10-CM

## 2019-04-30 PROCEDURE — 99214 OFFICE O/P EST MOD 30 MIN: CPT

## 2019-05-01 ENCOUNTER — RX RENEWAL (OUTPATIENT)
Age: 39
End: 2019-05-01

## 2019-05-10 PROBLEM — E66.01 MORBID OBESITY WITH BMI OF 70 AND OVER, ADULT: Status: RESOLVED | Noted: 2018-12-17 | Resolved: 2019-05-10

## 2019-05-13 ENCOUNTER — APPOINTMENT (OUTPATIENT)
Dept: BARIATRICS/WEIGHT MGMT | Facility: CLINIC | Age: 39
End: 2019-05-13
Payer: COMMERCIAL

## 2019-05-13 VITALS
OXYGEN SATURATION: 94 % | HEIGHT: 76 IN | DIASTOLIC BLOOD PRESSURE: 89 MMHG | WEIGHT: 315 LBS | SYSTOLIC BLOOD PRESSURE: 142 MMHG | BODY MASS INDEX: 38.36 KG/M2 | HEART RATE: 104 BPM

## 2019-05-13 PROCEDURE — 99214 OFFICE O/P EST MOD 30 MIN: CPT

## 2019-05-13 NOTE — HISTORY OF PRESENT ILLNESS
[FreeTextEntry1] : This is a 38 year old male  present in office today for followup visit. \par \par Patient has lost 1 pound since last visit. He states he has done a "no carb" diet for the last 2 weeks. Most days he will have 3 meals. His appetite has been well controlled with Belviq and Trulicity. He has not been keeping a food journal. He has not been to the gym in weeks, states he walks when he can. Patient will come home from work around 6-7pm eat dinner, and watch tv until he falls asleep. He has been wearing his CPAP more regularly, getting on average 5-6 hours a night. He reports improved sleep with CPAP

## 2019-05-13 NOTE — ASSESSMENT
[FreeTextEntry1] : Plan: \par \par Recommended patient incorporate whole grains 1x/day, in the morning and decrease amount of red meat . Encouraged to eat lunch daily. Avoid any added fat, sugar, refined carbohydrate\par Keep food journal\par Walk daily after work, before dinner. \par Continue on current medication regime \par \par RTO 3-4 weeks

## 2019-05-13 NOTE — REASON FOR VISIT
[Follow-Up Visit] : a follow-up visit for [Diabetes Mellitus] : diabetes mellitus [Obesity] : obesity

## 2019-05-17 NOTE — HISTORY OF PRESENT ILLNESS
[de-identified] : 38 year old male with longstanding history of morbid obesity undergoing workup for laparoscopic sleeve gastrectomy presents today for follow up visit. Patient lost weight since consultation, which he attributes to medications prescribed by Dr. Jordan and decreased portion sizes. However, he tends miss meals because of lack of appetite. He walks 8-10K steps.

## 2019-05-17 NOTE — ASSESSMENT
[FreeTextEntry1] : 38 year old male with longstanding history of obesity undergoing preoperative workup for laparoscopic sleeve gastrectomy here today for follow up visit. Patient has lost weight since last visit and will need to lose weight prior to surgery. He will continue to work with medical weight management to lose weight. Nutrition and exercise guidelines were reviewed with the patient. Procedure and risks reviewed. All questions answered.\par \par Continue to see obesity medicine specialist and nutritionist\par Continue completing preoperative workup\par Follow up when closer to BMI 60\par Call with any questions or concerns.\par

## 2019-05-17 NOTE — PHYSICAL EXAM
[Obese, well nourished, in no acute distress] : obese, well nourished, in no acute distress [Normal] : affect appropriate [de-identified] : EOMI, anicteric  [de-identified] : soft and nontender, no hernias appreciated.

## 2019-06-06 ENCOUNTER — OUTPATIENT (OUTPATIENT)
Dept: OUTPATIENT SERVICES | Facility: HOSPITAL | Age: 39
LOS: 1 days | Discharge: HOME | End: 2019-06-06

## 2019-06-06 DIAGNOSIS — D72.829 ELEVATED WHITE BLOOD CELL COUNT, UNSPECIFIED: ICD-10-CM

## 2019-06-11 ENCOUNTER — APPOINTMENT (OUTPATIENT)
Dept: BARIATRICS | Facility: CLINIC | Age: 39
End: 2019-06-11
Payer: COMMERCIAL

## 2019-06-11 ENCOUNTER — APPOINTMENT (OUTPATIENT)
Dept: BARIATRICS/WEIGHT MGMT | Facility: CLINIC | Age: 39
End: 2019-06-11
Payer: SELF-PAY

## 2019-06-11 VITALS
HEIGHT: 76 IN | HEART RATE: 113 BPM | DIASTOLIC BLOOD PRESSURE: 90 MMHG | BODY MASS INDEX: 38.36 KG/M2 | WEIGHT: 315 LBS | SYSTOLIC BLOOD PRESSURE: 130 MMHG

## 2019-06-11 PROCEDURE — 99214 OFFICE O/P EST MOD 30 MIN: CPT

## 2019-06-11 PROCEDURE — 97803 MED NUTRITION INDIV SUBSEQ: CPT

## 2019-06-12 ENCOUNTER — MEDICATION RENEWAL (OUTPATIENT)
Age: 39
End: 2019-06-12

## 2019-06-12 ENCOUNTER — RESULT REVIEW (OUTPATIENT)
Age: 39
End: 2019-06-12

## 2019-06-12 ENCOUNTER — APPOINTMENT (OUTPATIENT)
Dept: HEMATOLOGY ONCOLOGY | Facility: CLINIC | Age: 39
End: 2019-06-12
Payer: COMMERCIAL

## 2019-06-12 ENCOUNTER — OUTPATIENT (OUTPATIENT)
Dept: OUTPATIENT SERVICES | Facility: HOSPITAL | Age: 39
LOS: 1 days | End: 2019-06-12
Payer: COMMERCIAL

## 2019-06-12 VITALS
WEIGHT: 315 LBS | DIASTOLIC BLOOD PRESSURE: 84 MMHG | OXYGEN SATURATION: 97 % | SYSTOLIC BLOOD PRESSURE: 130 MMHG | HEART RATE: 112 BPM | HEIGHT: 76 IN | BODY MASS INDEX: 38.36 KG/M2

## 2019-06-12 DIAGNOSIS — D72.829 ELEVATED WHITE BLOOD CELL COUNT, UNSPECIFIED: ICD-10-CM

## 2019-06-12 LAB
BASOPHILS # BLD AUTO: 0.1 K/UL — SIGNIFICANT CHANGE UP (ref 0–0.2)
BASOPHILS NFR BLD AUTO: 0.8 % — SIGNIFICANT CHANGE UP (ref 0–2)
EOSINOPHIL # BLD AUTO: 0.8 K/UL — HIGH (ref 0–0.5)
EOSINOPHIL NFR BLD AUTO: 4.8 % — SIGNIFICANT CHANGE UP (ref 0–6)
HCT VFR BLD CALC: 45.5 % — SIGNIFICANT CHANGE UP (ref 39–50)
HGB BLD-MCNC: 15.6 G/DL — SIGNIFICANT CHANGE UP (ref 13–17)
LYMPHOCYTES # BLD AUTO: 18.8 % — SIGNIFICANT CHANGE UP (ref 13–44)
LYMPHOCYTES # BLD AUTO: 3 K/UL — SIGNIFICANT CHANGE UP (ref 1–3.3)
MCHC RBC-ENTMCNC: 25.9 PG — LOW (ref 27–34)
MCHC RBC-ENTMCNC: 34.3 G/DL — SIGNIFICANT CHANGE UP (ref 32–36)
MCV RBC AUTO: 75.5 FL — LOW (ref 80–100)
MONOCYTES # BLD AUTO: 0.8 K/UL — SIGNIFICANT CHANGE UP (ref 0–0.9)
MONOCYTES NFR BLD AUTO: 5 % — SIGNIFICANT CHANGE UP (ref 2–14)
NEUTROPHILS # BLD AUTO: 11.4 K/UL — HIGH (ref 1.8–7.4)
NEUTROPHILS NFR BLD AUTO: 70.6 % — SIGNIFICANT CHANGE UP (ref 43–77)
PLATELET # BLD AUTO: 307 K/UL — SIGNIFICANT CHANGE UP (ref 150–400)
RBC # BLD: 6.02 M/UL — HIGH (ref 4.2–5.8)
RBC # FLD: 14.4 % — SIGNIFICANT CHANGE UP (ref 10.3–14.5)
WBC # BLD: 16.2 K/UL — HIGH (ref 3.8–10.5)
WBC # FLD AUTO: 16.2 K/UL — HIGH (ref 3.8–10.5)

## 2019-06-12 PROCEDURE — 99204 OFFICE O/P NEW MOD 45 MIN: CPT

## 2019-06-12 PROCEDURE — G0452: CPT | Mod: 26

## 2019-06-12 PROCEDURE — 81206 BCR/ABL1 GENE MAJOR BP: CPT

## 2019-06-12 PROCEDURE — 81207 BCR/ABL1 GENE MINOR BP: CPT

## 2019-06-12 NOTE — REVIEW OF SYSTEMS
[Recent Change In Weight] : ~T recent weight change [Negative] : Heme/Lymph [Fever] : no fever [Chills] : no chills [Dysphagia] : no dysphagia [Chest Pain] : no chest pain [Hoarseness] : no hoarseness [Lower Ext Edema] : no lower extremity edema [Palpitations] : no palpitations [Shortness Of Breath] : no shortness of breath [SOB on Exertion] : no shortness of breath during exertion [Wheezing] : no wheezing [Cough] : no cough [Constipation] : no constipation [Abdominal Pain] : no abdominal pain [Vomiting] : no vomiting [Diarrhea] : no diarrhea [Reflux/Heartburn] : no reflex/heartburn [FreeTextEntry2] : weight stable

## 2019-06-12 NOTE — HISTORY OF PRESENT ILLNESS
[de-identified] : 38 year old M undergoing workup for laparoscopic sleeve gastrectomy here for PRE OP VISIT. Weight stable since last visit.  Patient is currently in the process of completing his workup as well as a medically supervised diet. Patient is making efforts to improve food choices and increased activity.Pt continues work on  following a protein focused diet - 3 meals a day - consuming a sufficient amount of zero calorie liquid. .Exercising regularly as recommended- plan to increase daily strength training. .All questions were answered.Discussed and reviewed further requirements needed prior to scheduling surgery. Pt saw nutritionist today - Dot MORRIS discussed and reviewed nutritional guidelines and weight loss strategies . Patient knows he needs to lose weight prior to surgery \par

## 2019-06-12 NOTE — ASSESSMENT
[FreeTextEntry1] : 38 year old M undergoing workup for laparoscopic sleeve gastrectomy here for PRE OP VISIT. Weight stable since last visit.  Patient is currently in the process of completing his workup as well as a medically supervised diet.\par \par Pt saw nutritionist today - Dot MORRIS discussed and reviewed nutritional guidelines and weight loss strategies . \par \par Nutritional counseling has been provided. The patient is encouraged to remain calorie conscious and continue a low fat, low carbohydrate, protein focus diet. Pt encouraged to participate in a daily exercise regimen incorporating cardio and strength training. Patient knows he needs to lose weight prior to surgery.\par \par Dr. Viveros saw patient today. \par \par Return to office in 4-6 weeks or earlier if any concerns. \par

## 2019-06-12 NOTE — PHYSICAL EXAM
[Obese, well nourished, in no acute distress] : obese, well nourished, in no acute distress [Normal] : affect appropriate [de-identified] : normoactive bowel sounds, soft and non tender, no hepatosplenomegaly or masses appreciated.

## 2019-06-14 LAB — BCR/ABL BY RT - PCR QUANTITATIVE: SIGNIFICANT CHANGE UP

## 2019-06-17 ENCOUNTER — APPOINTMENT (OUTPATIENT)
Dept: BARIATRICS/WEIGHT MGMT | Facility: CLINIC | Age: 39
End: 2019-06-17
Payer: COMMERCIAL

## 2019-06-17 VITALS
DIASTOLIC BLOOD PRESSURE: 79 MMHG | SYSTOLIC BLOOD PRESSURE: 140 MMHG | HEART RATE: 98 BPM | BODY MASS INDEX: 38.36 KG/M2 | OXYGEN SATURATION: 96 % | HEIGHT: 76 IN | WEIGHT: 315 LBS

## 2019-06-17 PROCEDURE — 99214 OFFICE O/P EST MOD 30 MIN: CPT

## 2019-06-17 NOTE — ASSESSMENT
[FreeTextEntry1] : Plan: \par \par Continue whole food plant based concept. Avoid any added fat, sugar, refined carbohydrate\par Recommend lean protein, minimize red meat \par Prepare foods at home\par Discussed exercise plan. Swim most days, otherwise walk after dinner \par Discussed strategies to maintain weight loss while on vacation. Avoid alcohol, do not over eat, and exercise\par Continue current medication regime \par f/u with ALEXANDRA and Dr Savage \par \par RTO 3-4 weeks

## 2019-06-17 NOTE — HISTORY OF PRESENT ILLNESS
[de-identified] : Mr. Morgan is a 38 year old male presenting for an initial consultation for leukocytosis, referred for clearance for anticipated sleeve gastrectomy (564 lbs). He presents one week s/p cold with a WBC of 16.2, lymphocytes 3.0 (18.8%), neutrophils 11.4 (70.6%). WBC count has been elevated chronically, dating back to at least 2/11/17, at which point WBC was 12.01. [de-identified] : Has bariatric surgery planned.\par Had a cold last week. Otherwise, denies frequent infections.\par Feeling well overall.

## 2019-06-17 NOTE — ADDENDUM
[FreeTextEntry1] : Documented by Richie Holguin acting as scribe for Dr. Lorenzo Smart on 6/12/2019.\par \par All medical record entries made by the Scribe were at my (Dr. Lorenzo Smart's) direction and personally dictated by me on aforementioned date. I have reviewed the chart and agree that the record accurately reflects my personal performance of the history, physical exam, assessment and plan. I have also personally directed, reviewed, and agree with the discharge instructions.

## 2019-06-17 NOTE — HISTORY OF PRESENT ILLNESS
[FreeTextEntry1] : This is a 38 year old male  present in office today for followup visit. \par \par Patient has lost 5 pounds since last visit. He remains on Belviq, trulicity and metformin, tolerating well. reports improvement in appetite, although he still tends to skip breakfast and at times lunch. He was slightly more active this past weekend working on his pool. In the summer he swims. Otherwise he has not returned to the gym. He will walk at times, but does not participate in any regular activity. He wears his CPAP regularly. He will be going on vacation to [place] in july and a cruise in September \par He has been following with Dr Viveros, as per patient she would encourage patient to lose another 15-20 pounds for surgery

## 2019-07-06 ENCOUNTER — TRANSCRIPTION ENCOUNTER (OUTPATIENT)
Age: 39
End: 2019-07-06

## 2019-07-08 ENCOUNTER — APPOINTMENT (OUTPATIENT)
Dept: BARIATRICS/WEIGHT MGMT | Facility: CLINIC | Age: 39
End: 2019-07-08
Payer: COMMERCIAL

## 2019-07-08 VITALS
SYSTOLIC BLOOD PRESSURE: 138 MMHG | HEART RATE: 97 BPM | OXYGEN SATURATION: 95 % | DIASTOLIC BLOOD PRESSURE: 88 MMHG | HEIGHT: 76 IN

## 2019-07-08 VITALS — BODY MASS INDEX: 67.17 KG/M2 | WEIGHT: 315 LBS

## 2019-07-08 PROCEDURE — 99214 OFFICE O/P EST MOD 30 MIN: CPT

## 2019-07-08 NOTE — HISTORY OF PRESENT ILLNESS
[FreeTextEntry1] : This is a 38 year old male  present in office today for followup visit. \par \par Patient has lost 3 pounds since last visit. He remains on Belviq, trulicity and metformin, tolerating well. reports improvement in appetite. He is trying to  eat at least 2 meals a day. Recently more red meat. He tried quinoa did not like it. He has not kept a food journal \par He injured his foot requiring liquid stitches, unable to swim at this time. \par  He wears his CPAP regularly. He will be going on vacation to [place] in july and a cruise in September \par He has been following with Dr Viveros, as per patient she would encourage patient to lose another 15-20 pounds for surgery

## 2019-07-08 NOTE — ASSESSMENT
[FreeTextEntry1] : Plan: \par \par Continue whole food . Encouraged to try to grains. Avoid any added fat, sugar, refined carbohydrate\par Recommend lean protein, minimize/avoid red meat \par Keep food journal \par Follow recommendations from urgent care MD regarding swimming, go to gym instead  \par Continue current medication regime \par f/u with ALEXANDRA and Dr Savage \par \par RTO 4-6 weeks

## 2019-07-29 ENCOUNTER — APPOINTMENT (OUTPATIENT)
Dept: FAMILY MEDICINE | Facility: CLINIC | Age: 39
End: 2019-07-29
Payer: COMMERCIAL

## 2019-07-29 ENCOUNTER — LABORATORY RESULT (OUTPATIENT)
Age: 39
End: 2019-07-29

## 2019-07-29 VITALS
RESPIRATION RATE: 13 BRPM | DIASTOLIC BLOOD PRESSURE: 72 MMHG | HEIGHT: 76 IN | SYSTOLIC BLOOD PRESSURE: 118 MMHG | BODY MASS INDEX: 38.36 KG/M2 | WEIGHT: 315 LBS | OXYGEN SATURATION: 94 % | HEART RATE: 80 BPM

## 2019-07-29 PROCEDURE — 36415 COLL VENOUS BLD VENIPUNCTURE: CPT

## 2019-07-29 PROCEDURE — 99214 OFFICE O/P EST MOD 30 MIN: CPT | Mod: 25

## 2019-07-30 ENCOUNTER — APPOINTMENT (OUTPATIENT)
Dept: BARIATRICS/WEIGHT MGMT | Facility: CLINIC | Age: 39
End: 2019-07-30
Payer: SELF-PAY

## 2019-07-30 ENCOUNTER — APPOINTMENT (OUTPATIENT)
Dept: BARIATRICS | Facility: CLINIC | Age: 39
End: 2019-07-30
Payer: COMMERCIAL

## 2019-07-30 VITALS — HEIGHT: 76 IN | WEIGHT: 315 LBS | BODY MASS INDEX: 38.36 KG/M2

## 2019-07-30 VITALS
SYSTOLIC BLOOD PRESSURE: 140 MMHG | OXYGEN SATURATION: 98 % | DIASTOLIC BLOOD PRESSURE: 90 MMHG | HEIGHT: 76 IN | HEART RATE: 88 BPM | WEIGHT: 315 LBS | BODY MASS INDEX: 38.36 KG/M2

## 2019-07-30 LAB
ALBUMIN SERPL ELPH-MCNC: 4.5 G/DL
ALP BLD-CCNC: 84 U/L
ALT SERPL-CCNC: 44 U/L
ANION GAP SERPL CALC-SCNC: 15 MMOL/L
APPEARANCE: ABNORMAL
AST SERPL-CCNC: 26 U/L
BASOPHILS # BLD AUTO: 0.05 K/UL
BASOPHILS NFR BLD AUTO: 0.5 %
BILIRUB SERPL-MCNC: 0.4 MG/DL
BILIRUBIN URINE: NEGATIVE
BLOOD URINE: NEGATIVE
BUN SERPL-MCNC: 12 MG/DL
C PEPTIDE SERPL-MCNC: 4 NG/ML
CALCIUM SERPL-MCNC: 9.6 MG/DL
CHLORIDE SERPL-SCNC: 100 MMOL/L
CHOLEST SERPL-MCNC: 167 MG/DL
CHOLEST/HDLC SERPL: 5.2 RATIO
CO2 SERPL-SCNC: 25 MMOL/L
COLOR: YELLOW
CREAT SERPL-MCNC: 0.91 MG/DL
CREAT SPEC-SCNC: 185 MG/DL
EOSINOPHIL # BLD AUTO: 0.09 K/UL
EOSINOPHIL NFR BLD AUTO: 0.8 %
ESTIMATED AVERAGE GLUCOSE: 117 MG/DL
FERRITIN SERPL-MCNC: 166 NG/ML
FOLATE SERPL-MCNC: 9.3 NG/ML
GLUCOSE QUALITATIVE U: ABNORMAL
GLUCOSE SERPL-MCNC: 87 MG/DL
HBA1C MFR BLD HPLC: 5.7 %
HCT VFR BLD CALC: 44.2 %
HDLC SERPL-MCNC: 32 MG/DL
HGB BLD-MCNC: 13.7 G/DL
IMM GRANULOCYTES NFR BLD AUTO: 0.3 %
IRON SATN MFR SERPL: 18 %
IRON SERPL-MCNC: 52 UG/DL
KETONES URINE: NEGATIVE
LDLC SERPL CALC-MCNC: 117 MG/DL
LEUKOCYTE ESTERASE URINE: NEGATIVE
LYMPHOCYTES # BLD AUTO: 2.02 K/UL
LYMPHOCYTES NFR BLD AUTO: 18.6 %
MAN DIFF?: NORMAL
MCHC RBC-ENTMCNC: 24.8 PG
MCHC RBC-ENTMCNC: 31 GM/DL
MCV RBC AUTO: 79.9 FL
MICROALBUMIN 24H UR DL<=1MG/L-MCNC: 6.9 MG/DL
MICROALBUMIN/CREAT 24H UR-RTO: 37 MG/G
MONOCYTES # BLD AUTO: 0.64 K/UL
MONOCYTES NFR BLD AUTO: 5.9 %
NEUTROPHILS # BLD AUTO: 8.01 K/UL
NEUTROPHILS NFR BLD AUTO: 73.9 %
NITRITE URINE: NEGATIVE
PH URINE: 5.5
PLATELET # BLD AUTO: 315 K/UL
POTASSIUM SERPL-SCNC: 4.4 MMOL/L
PROT SERPL-MCNC: 8.3 G/DL
PROTEIN URINE: ABNORMAL
PSA SERPL-MCNC: 0.26 NG/ML
RBC # BLD: 5.53 M/UL
RBC # FLD: 16.5 %
SODIUM SERPL-SCNC: 140 MMOL/L
SPECIFIC GRAVITY URINE: 1.02
T4 SERPL-MCNC: 7.1 UG/DL
TIBC SERPL-MCNC: 290 UG/DL
TRIGL SERPL-MCNC: 89 MG/DL
TSH SERPL-ACNC: 1.72 UIU/ML
UIBC SERPL-MCNC: 238 UG/DL
UROBILINOGEN URINE: NORMAL
VIT B12 SERPL-MCNC: 702 PG/ML
WBC # FLD AUTO: 10.84 K/UL

## 2019-07-30 PROCEDURE — 99214 OFFICE O/P EST MOD 30 MIN: CPT

## 2019-07-30 PROCEDURE — 97803 MED NUTRITION INDIV SUBSEQ: CPT

## 2019-07-30 RX ORDER — SULFAMETHOXAZOLE AND TRIMETHOPRIM 800; 160 MG/1; MG/1
800-160 TABLET ORAL
Qty: 14 | Refills: 0 | Status: COMPLETED | COMMUNITY
Start: 2019-07-06 | End: 2019-07-30

## 2019-07-30 NOTE — ASSESSMENT
[FreeTextEntry1] : 38 year old male with longstanding history of obesity undergoing preoperative workup for laparoscopic sleeve gastrectomy here today for follow up visit. Patient lost weight since last visit and will need to continue to lose weight prior to surgery. He will continue to work with medical weight management to lose weight. Nutrition and exercise guidelines were reviewed with the patient. Procedure and risks reviewed. All questions answered.  \par \par Continue to see obesity medicine specialist and nutritionist\par Continue completing preoperative workup\par Follow up in 6 weeks\par Call with any questions or concerns.

## 2019-07-30 NOTE — HISTORY OF PRESENT ILLNESS
[de-identified] : 38 year old male with longstanding history of morbid obesity undergoing workup for laparoscopic sleeve gastrectomy presents today for follow up visit. Patient lost weight since last visit, which he attributes to better food choices and smaller portion sizes. Patient had appointment with nutritionist today and he reports that he continues to miss meals because of lack of appetite. He mostly has water and occasionally has soda and alcohol. He walks 8-10K steps.

## 2019-07-30 NOTE — PHYSICAL EXAM
[Obese, well nourished, in no acute distress] : obese, well nourished, in no acute distress [Normal] : affect appropriate [de-identified] : EOMI, anicteric  [de-identified] : soft and nontender, no hernias appreciated.

## 2019-07-30 NOTE — REVIEW OF SYSTEMS
[Recent Change In Weight] : ~T recent weight change [Negative] : Endocrine [Fever] : no fever [Dysphagia] : no dysphagia [Chills] : no chills [Hoarseness] : no hoarseness [Chest Pain] : no chest pain [Lower Ext Edema] : no lower extremity edema [Palpitations] : no palpitations [Shortness Of Breath] : no shortness of breath [Abdominal Pain] : no abdominal pain [Vomiting] : no vomiting [Constipation] : no constipation [Diarrhea] : no diarrhea [Reflux/Heartburn] : no reflex/heartburn [FreeTextEntry2] : weight loss

## 2019-08-01 LAB — 25(OH)D3 SERPL-MCNC: 29.1 NG/ML

## 2019-08-12 ENCOUNTER — MEDICATION RENEWAL (OUTPATIENT)
Age: 39
End: 2019-08-12

## 2019-08-12 ENCOUNTER — APPOINTMENT (OUTPATIENT)
Dept: FAMILY MEDICINE | Facility: CLINIC | Age: 39
End: 2019-08-12
Payer: COMMERCIAL

## 2019-08-12 VITALS
RESPIRATION RATE: 13 BRPM | HEIGHT: 76 IN | BODY MASS INDEX: 38.36 KG/M2 | HEART RATE: 110 BPM | OXYGEN SATURATION: 98 % | WEIGHT: 315 LBS | DIASTOLIC BLOOD PRESSURE: 82 MMHG | SYSTOLIC BLOOD PRESSURE: 132 MMHG

## 2019-08-12 PROCEDURE — 99214 OFFICE O/P EST MOD 30 MIN: CPT

## 2019-08-19 ENCOUNTER — APPOINTMENT (OUTPATIENT)
Dept: BARIATRICS/WEIGHT MGMT | Facility: CLINIC | Age: 39
End: 2019-08-19
Payer: COMMERCIAL

## 2019-08-19 VITALS
HEART RATE: 105 BPM | OXYGEN SATURATION: 96 % | DIASTOLIC BLOOD PRESSURE: 100 MMHG | BODY MASS INDEX: 38.36 KG/M2 | HEIGHT: 76 IN | SYSTOLIC BLOOD PRESSURE: 150 MMHG | WEIGHT: 315 LBS

## 2019-08-19 VITALS — DIASTOLIC BLOOD PRESSURE: 90 MMHG | SYSTOLIC BLOOD PRESSURE: 130 MMHG | HEART RATE: 103 BPM

## 2019-08-19 PROCEDURE — 99214 OFFICE O/P EST MOD 30 MIN: CPT

## 2019-08-19 NOTE — HISTORY OF PRESENT ILLNESS
[FreeTextEntry1] : This is a 39 year old male  present in office today for followup visit. \par \par Patient's weight unchanged since last visit, although he might have lose and regained according to the weights taken in previous bariatric visits. He has not been consistent with his CPAP use, often falls asleep without it. He continues to prepare most food at home, not eating out as frequently. He may start his day with a protein shake and have small meals during the day. Dinners are still his largest meal. He admits for 2 weeks he ate dessert nightly since he had family visiting. \par He will go on walks, vague on how consistent his exercise is. He has not been swimming in pool as he was last visit. \par He continues current medication regime\par Blood pressure slightly elevated in office, he stated his PCP told him his sodium levels were low so he has recently been add salt to his food. \par He leaves for his cruise end of sept. According to patient, Dr Savage is recommending another 10 pounds of weight loss for surgery.

## 2019-08-19 NOTE — ASSESSMENT
[FreeTextEntry1] : Plan: \par \par Continue whole food  concept. Avoid any added fat, sugar, refined carbohydrate\par Keep food journal, or take pictures of food for 1 week\par Front load food. If he drinks a protein shake, that is meant to be a meal replacement. \par Do not add salt to food, will monitor bp. He has f/u with PCP in oct \par Discussed exercise plan. He bought a fitbit, when he hooks it up he will note his baseline. Goal 7500 steps if he is not there already. Increase time in pool/go to gym with gf\par Continue current medication regime\par \par RTO 3-4 weeks

## 2019-08-19 NOTE — REASON FOR VISIT
[Follow-Up Visit] : a follow-up visit for [Obesity] : obesity [Obstructive Sleep Apena] : obstructive sleep apena

## 2019-08-23 ENCOUNTER — RX RENEWAL (OUTPATIENT)
Age: 39
End: 2019-08-23

## 2019-09-24 ENCOUNTER — APPOINTMENT (OUTPATIENT)
Dept: BARIATRICS | Facility: CLINIC | Age: 39
End: 2019-09-24
Payer: COMMERCIAL

## 2019-09-24 ENCOUNTER — APPOINTMENT (OUTPATIENT)
Dept: BARIATRICS/WEIGHT MGMT | Facility: CLINIC | Age: 39
End: 2019-09-24
Payer: SELF-PAY

## 2019-09-24 VITALS — WEIGHT: 315 LBS | BODY MASS INDEX: 38.36 KG/M2 | HEIGHT: 76 IN

## 2019-09-24 VITALS
HEIGHT: 76 IN | SYSTOLIC BLOOD PRESSURE: 130 MMHG | OXYGEN SATURATION: 93 % | DIASTOLIC BLOOD PRESSURE: 90 MMHG | WEIGHT: 315 LBS | BODY MASS INDEX: 38.36 KG/M2 | HEART RATE: 87 BPM

## 2019-09-24 PROCEDURE — 99214 OFFICE O/P EST MOD 30 MIN: CPT

## 2019-09-24 PROCEDURE — 97803 MED NUTRITION INDIV SUBSEQ: CPT

## 2019-09-24 NOTE — ASSESSMENT
[FreeTextEntry1] : 39 year old male with longstanding history of obesity undergoing preoperative workup for laparoscopic sleeve gastrectomy here today for follow up visit. Patient lost weight since last visit and will need to continue to lose weight prior to surgery. He will continue to work with medical weight management to lose weight. He will need personal scale. Nutrition and exercise guidelines were reviewed with the patient. Procedure and risks reviewed. All questions answered.  \par \par Weight loss\par Continue to see obesity medicine specialist and nutritionist\par Continue completing preoperative workup\par Follow up in 1 month\par Call with any questions or concerns.

## 2019-09-24 NOTE — REVIEW OF SYSTEMS
[Recent Change In Weight] : ~T recent weight change [Negative] : Heme/Lymph [Fever] : no fever [Chills] : no chills [Dysphagia] : no dysphagia [Chest Pain] : no chest pain [Hoarseness] : no hoarseness [Palpitations] : no palpitations [Lower Ext Edema] : no lower extremity edema [Abdominal Pain] : no abdominal pain [Shortness Of Breath] : no shortness of breath [Constipation] : no constipation [Vomiting] : no vomiting [Diarrhea] : no diarrhea [FreeTextEntry2] : weight loss [Reflux/Heartburn] : no reflex/heartburn

## 2019-09-24 NOTE — HISTORY OF PRESENT ILLNESS
[de-identified] : 39 year old male with longstanding history of morbid obesity undergoing workup for laparoscopic sleeve gastrectomy presents today for follow up visit. Patient lost weight since last visit. He continues to work with medical weight management to help with preop weight loss. Nutritionist, Dot Rosado, briefly met with patient today and he reports that he continues to miss meals because of lack of appetite.  He mostly has water and occasionally has alcohol. He walks 8-10K steps.

## 2019-09-24 NOTE — PHYSICAL EXAM
[Obese, well nourished, in no acute distress] : obese, well nourished, in no acute distress [Normal] : affect appropriate [de-identified] : EOMI, anicteric  [de-identified] : soft and nontender, no hernias appreciated.

## 2019-10-14 ENCOUNTER — APPOINTMENT (OUTPATIENT)
Dept: BARIATRICS/WEIGHT MGMT | Facility: CLINIC | Age: 39
End: 2019-10-14
Payer: COMMERCIAL

## 2019-10-14 VITALS
HEART RATE: 106 BPM | OXYGEN SATURATION: 97 % | SYSTOLIC BLOOD PRESSURE: 142 MMHG | HEIGHT: 76 IN | DIASTOLIC BLOOD PRESSURE: 82 MMHG

## 2019-10-14 VITALS — HEIGHT: 76 IN | WEIGHT: 315 LBS | BODY MASS INDEX: 38.36 KG/M2

## 2019-10-14 PROCEDURE — 99214 OFFICE O/P EST MOD 30 MIN: CPT

## 2019-10-14 NOTE — ASSESSMENT
[FreeTextEntry1] : BARIATRIC SURGERY HISTORY: none\par OBESITY CO-MORBIDITIES: MADELYN,  prediabetes\par ANTI-OBESITY MEDICATIONS: trulicity, belviq, metformin\par OBESITY MEDICATION SIDE EFFECTS: none \par  \par \par Plan: \par \par According to patient he still wants surgery. Advised him to set up appts needed for clearance \par Discussed in length the importance of changing frequency of meals and portion size. Eat 2 meals a day to start, goal 3. Be mindful of portion sizes \par Keep food journal\par Discussed exercise plan. Use fitbit. Goal 40755 steps daily. Join new gym. \par Continue current medication regime\par \par RTO 4-6 weeks

## 2019-10-14 NOTE — HISTORY OF PRESENT ILLNESS
[FreeTextEntry1] : This is a 39 year old male  present in office today for followup visit. \par \par Patient has lost 17 pounds since last visit with surgery. Total of 68 pounds since initial visit with weight management. \par He just returned this morning from [place], from a cruise. He said he ate breakfast most mornings, would skip lunch and eat dinner. He does still eat out, recently went to [employer] but got ill right after eating a burger. He doesn't think his body can handle food like that anymore. He plans to prepare more food at home to bring to work for lunch (eggs and tuna), but dinners are still his largest meal, sometimes his only meal depending on how busy he is during the day. He is not keeping a food journal\par \par Patient states he is walking more, although does not have a step counter to quantify it. He no longer has a gym membership. \par He continues current medication regime without difficulty \par \par As of now he has not set appts for pulm cardiology or GI

## 2019-10-22 ENCOUNTER — APPOINTMENT (OUTPATIENT)
Dept: BARIATRICS | Facility: CLINIC | Age: 39
End: 2019-10-22
Payer: COMMERCIAL

## 2019-10-22 ENCOUNTER — APPOINTMENT (OUTPATIENT)
Dept: BARIATRICS/WEIGHT MGMT | Facility: CLINIC | Age: 39
End: 2019-10-22
Payer: SELF-PAY

## 2019-10-22 VITALS — WEIGHT: 315 LBS | HEIGHT: 76 IN | BODY MASS INDEX: 38.36 KG/M2

## 2019-10-22 VITALS
WEIGHT: 315 LBS | BODY MASS INDEX: 38.36 KG/M2 | SYSTOLIC BLOOD PRESSURE: 136 MMHG | OXYGEN SATURATION: 96 % | HEIGHT: 76 IN | DIASTOLIC BLOOD PRESSURE: 96 MMHG | HEART RATE: 100 BPM

## 2019-10-22 PROCEDURE — 99213 OFFICE O/P EST LOW 20 MIN: CPT

## 2019-10-22 PROCEDURE — 97803 MED NUTRITION INDIV SUBSEQ: CPT

## 2019-10-31 NOTE — PHYSICAL EXAM
[Obese, well nourished, in no acute distress] : obese, well nourished, in no acute distress [Normal] : affect appropriate [de-identified] : EOMI, anicteric  [de-identified] : soft and nontender, no hernias appreciated.

## 2019-10-31 NOTE — REVIEW OF SYSTEMS
[Recent Change In Weight] : ~T recent weight change [Negative] : Heme/Lymph [Fever] : no fever [Chills] : no chills [Dysphagia] : no dysphagia [Hoarseness] : no hoarseness [Chest Pain] : no chest pain [Palpitations] : no palpitations [Lower Ext Edema] : no lower extremity edema [Shortness Of Breath] : no shortness of breath [Abdominal Pain] : no abdominal pain [Vomiting] : no vomiting [Constipation] : no constipation [Diarrhea] : no diarrhea [Reflux/Heartburn] : no reflex/heartburn [FreeTextEntry2] : weight loss

## 2019-10-31 NOTE — ASSESSMENT
[FreeTextEntry1] : 39 year old male with longstanding history of obesity undergoing preoperative workup for laparoscopic sleeve gastrectomy here today for follow up visit. Patient gained weight since last visit and will need to continue to lose weight prior to surgery. He will continue to work with medical weight management to lose weight. Nutrition and exercise guidelines were reviewed with the patient. Procedure and risks reviewed. All questions answered.  \par \par Weight loss\par Continue to see obesity medicine specialist and nutritionist\par Continue completing preoperative workup\par Follow up in 1 month with nutritionist appointment same day\par Call with any questions or concerns.

## 2019-10-31 NOTE — HISTORY OF PRESENT ILLNESS
[de-identified] : 39 year old male with longstanding history of morbid obesity undergoing workup for laparoscopic sleeve gastrectomy presents today for follow up visit. Patient gained weight since last visit. He continues to work with medical weight management to help with preop weight loss. Nutritionist, Dot Rosado, had appointment with patient today and he reports that he continues to miss meals and have one large meal for dinner and snacking.  He mostly has water and occasionally has alcohol. He walks 8-10K steps.

## 2019-11-11 ENCOUNTER — MEDICATION RENEWAL (OUTPATIENT)
Age: 39
End: 2019-11-11

## 2019-11-18 ENCOUNTER — APPOINTMENT (OUTPATIENT)
Dept: BARIATRICS/WEIGHT MGMT | Facility: CLINIC | Age: 39
End: 2019-11-18
Payer: COMMERCIAL

## 2019-11-18 VITALS — BODY MASS INDEX: 38.36 KG/M2 | WEIGHT: 315 LBS | HEIGHT: 76 IN

## 2019-11-18 VITALS
OXYGEN SATURATION: 96 % | SYSTOLIC BLOOD PRESSURE: 118 MMHG | HEIGHT: 76 IN | DIASTOLIC BLOOD PRESSURE: 76 MMHG | HEART RATE: 109 BPM

## 2019-11-18 PROCEDURE — 99214 OFFICE O/P EST MOD 30 MIN: CPT

## 2019-11-18 NOTE — REASON FOR VISIT
[Follow-Up Visit] : a follow-up visit for [Diabetes Mellitus] : diabetes mellitus [Obesity] : obesity [Obstructive Sleep Apena] : obstructive sleep apena

## 2019-11-18 NOTE — ASSESSMENT
[FreeTextEntry1] : BARIATRIC SURGERY HISTORY: none\par OBESITY CO-MORBIDITIES: MADELYN,  prediabetes\par ANTI-OBESITY MEDICATIONS: trulicity, belviq, metformin\par OBESITY MEDICATION SIDE EFFECTS: none \par  \par \par Plan: \par \par continue to f/u with RD- shake for breakfast, ideally larger lunch and smaller dinner. maybe consider 2 MR shakes and 1 meal. \par Join gym\par Continue current medication regime, will start topiramate 25mg and increase to 50mg if tolerating. Will not consider phentermine at this time b/c patient's elevated heart rate. Reviewed potential side effects, verbalizes understanding\par Wear CPAP nightly, goal >7 hours a night. \par \par RTO 3 weeks

## 2019-11-18 NOTE — HISTORY OF PRESENT ILLNESS
[FreeTextEntry1] : This is a 39 year old male  present in office today for followup visit. \par \par Patient has regained 7 pounds since last visit. \par He follows with RD and surgery, he still wants to proceed with bariatric surgery\par He has started drinking shakes for breakfast, still tends to skip lunch, dinner (meat, starch and vegetable). Dinner has been later lately, around 7pm. He states he is mindful of portion size \par He is sleeping better >7 hours, but not utilizing his cpap \par He has not been exercising, due to weather, and does not have a gym membership \par He continues current medication regime without any issues. \par \par

## 2019-11-19 ENCOUNTER — APPOINTMENT (OUTPATIENT)
Dept: BARIATRICS/WEIGHT MGMT | Facility: CLINIC | Age: 39
End: 2019-11-19
Payer: SELF-PAY

## 2019-11-19 ENCOUNTER — APPOINTMENT (OUTPATIENT)
Dept: BARIATRICS | Facility: CLINIC | Age: 39
End: 2019-11-19
Payer: COMMERCIAL

## 2019-11-19 VITALS
HEART RATE: 94 BPM | DIASTOLIC BLOOD PRESSURE: 90 MMHG | OXYGEN SATURATION: 97 % | SYSTOLIC BLOOD PRESSURE: 120 MMHG | BODY MASS INDEX: 38.36 KG/M2 | HEIGHT: 76 IN | WEIGHT: 315 LBS

## 2019-11-19 PROCEDURE — 97803 MED NUTRITION INDIV SUBSEQ: CPT

## 2019-11-19 PROCEDURE — 99214 OFFICE O/P EST MOD 30 MIN: CPT

## 2019-11-20 VITALS — HEIGHT: 76 IN | WEIGHT: 315 LBS | BODY MASS INDEX: 38.36 KG/M2

## 2019-11-22 NOTE — REVIEW OF SYSTEMS
[Recent Change In Weight] : ~T recent weight change [Chills] : no chills [Dysphagia] : no dysphagia [Chest Pain] : no chest pain [Palpitations] : no palpitations [Shortness Of Breath] : no shortness of breath [Wheezing] : no wheezing [SOB on Exertion] : no shortness of breath during exertion [Abdominal Pain] : no abdominal pain [Vomiting] : no vomiting [Constipation] : no constipation [Diarrhea] : no diarrhea [Reflux/Heartburn] : no reflex/heartburn [FreeTextEntry2] : weight loss  since last visit.

## 2019-11-22 NOTE — ASSESSMENT
[FreeTextEntry1] : 39 year old M undergoing workup for laparoscopic sleeve gastrectomy here for WEIGH IN VISIT. Weight loss since last visit.  Patient is currently in the process of completing his workup as well as a medically supervised diet. History of maladaptive eating patterns/ skipping meals. \par \par Pre op education classes completed. \par Nutrition one on one -seen today - following up periodically prior to surgery. \par Completed 6 weigh in visits prior to surgery. Patient is aware he needs to lose weight prior to surgery. \par Pt saw Obesity Medicine - Marija Zhu NP yesterday. follow up on 12/9/19\par Upper EGD scheduled -  \par Needs letter of support/ diet history / routine labs prior to surgery.\par Follow up with Dr. Viveros and Dot MORRIS - 1/9/2020 . Discuss FIT BIT next visit. \par Appointments  and testing with cardiology /pulmonary scheduled.\par Dr. Viveros saw patient . \par \par \par Nutritional counseling has been provided. The patient is encouraged to remain calorie conscious and continue a low fat, low carbohydrate, protein focus diet. Pt encouraged to participate in a daily exercise regimen incorporating cardio and strength training. \par \par Return to office in January 2020 or earlier if any concerns. \par \par

## 2019-11-22 NOTE — HISTORY OF PRESENT ILLNESS
[de-identified] : 39 year old M undergoing workup for laparoscopic sleeve gastrectomy here for WEIGH IN VISIT. Weight loss since last visit.  Patient is currently in the process of completing his workup as well as a medically supervised diet. Patient continues to make efforts to improve food choices and increased activity.Pt is following a protein focused diet - 3 meals a day - consuming a sufficient amount of zero calorie liquid.  Patient knows he needs to lose weight prior to surgery.Exercising regularly as recommended.All questions were answered.Discussed and reviewed further requirements needed prior to scheduling surgery. Discussed with patient goal prior to surgery - BMI ~ 62  ( 510 lbs). Pt saw Marija Zhu NP yesterday was started on Topiramate 25 mg . Plan to follow up with obesity medicine in December 2019.  Pt continues to work on being more compliant with CPAP machine. \par

## 2019-11-22 NOTE — PHYSICAL EXAM
[Obese, well nourished, in no acute distress] : obese, well nourished, in no acute distress [Normal] : grossly intact [de-identified] : normoactive bowel sounds, soft and non tender, no hepatosplenomegaly or masses appreciated.

## 2019-11-25 ENCOUNTER — APPOINTMENT (OUTPATIENT)
Dept: FAMILY MEDICINE | Facility: CLINIC | Age: 39
End: 2019-11-25

## 2019-12-05 ENCOUNTER — APPOINTMENT (OUTPATIENT)
Dept: FAMILY MEDICINE | Facility: CLINIC | Age: 39
End: 2019-12-05

## 2019-12-09 ENCOUNTER — APPOINTMENT (OUTPATIENT)
Dept: BARIATRICS/WEIGHT MGMT | Facility: CLINIC | Age: 39
End: 2019-12-09
Payer: COMMERCIAL

## 2019-12-09 VITALS
HEIGHT: 76 IN | WEIGHT: 315 LBS | BODY MASS INDEX: 38.36 KG/M2 | DIASTOLIC BLOOD PRESSURE: 96 MMHG | SYSTOLIC BLOOD PRESSURE: 130 MMHG | HEART RATE: 101 BPM | OXYGEN SATURATION: 97 %

## 2019-12-09 PROCEDURE — 99214 OFFICE O/P EST MOD 30 MIN: CPT

## 2019-12-09 NOTE — HISTORY OF PRESENT ILLNESS
[FreeTextEntry1] : This is a 39 year old male  present in office today for followup visit. \par \par Weight remains the same since last visit with obesity medicine \par He follows with RD and surgery, he still wants to proceed with bariatric surgery\par He has been taking topiramate 50mg QHS, he states he feels more tired, but not affecting him the next morning. He does not wake up tired, or report any other side effects from medication. Continues other medications without issues. He has not been taking invokana as his insurance company is not covering. Plans to see PCP next month, for blood work as well \par He drinks at least one MR shake a day,  tends to skip lunch, dinner (meat, starch and vegetable). \par Still not using CPAP regularly, machine needs to be cleaned. \par He feels he has been more active, going up and down stairs. Has a watch with a step tracker at home but has not worn it, b/c it needs to be charged. \par \par Patient is going to [place] for 2 weeks this month \par \par \par

## 2019-12-09 NOTE — REASON FOR VISIT
[Follow-Up Visit] : a follow-up visit for [Diabetes Mellitus] : diabetes mellitus [Obstructive Sleep Apena] : obstructive sleep apena [Obesity] : obesity

## 2020-01-09 ENCOUNTER — APPOINTMENT (OUTPATIENT)
Dept: BARIATRICS/WEIGHT MGMT | Facility: CLINIC | Age: 40
End: 2020-01-09
Payer: SELF-PAY

## 2020-01-09 ENCOUNTER — APPOINTMENT (OUTPATIENT)
Dept: BARIATRICS | Facility: CLINIC | Age: 40
End: 2020-01-09
Payer: COMMERCIAL

## 2020-01-09 VITALS
HEIGHT: 76 IN | OXYGEN SATURATION: 98 % | SYSTOLIC BLOOD PRESSURE: 142 MMHG | DIASTOLIC BLOOD PRESSURE: 88 MMHG | HEART RATE: 118 BPM | WEIGHT: 315 LBS | BODY MASS INDEX: 38.36 KG/M2

## 2020-01-09 VITALS — HEIGHT: 76 IN | WEIGHT: 315 LBS | BODY MASS INDEX: 38.36 KG/M2

## 2020-01-09 PROCEDURE — 99214 OFFICE O/P EST MOD 30 MIN: CPT

## 2020-01-09 PROCEDURE — 97803 MED NUTRITION INDIV SUBSEQ: CPT

## 2020-01-09 NOTE — ASSESSMENT
[FreeTextEntry1] : 39 year old M undergoing workup for laparoscopic sleeve gastrectomy here for PRE OP  VISIT. Weight stable since last visit.  Patient is currently in the process of completing his workup as well as a medically supervised diet.\par \par Pre op education classes completed. \par Nutrition one on one scheduled next week - 2/11/2020 Psych - completed. \par Pt will need additional weigh in visits prior to surgery. Patient is aware he needs to lose weight prior to surgery. \par Upper EGD - to be scheduled. \par Needs letter of support/ diet history / routine labs performed. \par Appointments  and testing with cardiology /pulmonary scheduled.\par Will need Endocrine clearance prior to surgery. \par \par Nutritional counseling has been provided. The patient is encouraged to remain calorie conscious and continue a low fat, low carbohydrate, protein focus diet. Pt encouraged to participate in a daily exercise regimen incorporating cardio and strength training. \par \par \par Dr. Viveros saw patient today. . \par Return to office in 4  weeks or earlier if any concerns. Will see nutritionist same day next month - February 2020.\par

## 2020-01-09 NOTE — REVIEW OF SYSTEMS
[Recent Change In Weight] : ~T recent weight change [Negative] : Heme/Lymph [Fever] : no fever [Chills] : no chills [Dysphagia] : no dysphagia [Chest Pain] : no chest pain [Lower Ext Edema] : no lower extremity edema [Shortness Of Breath] : no shortness of breath [Cough] : no cough [SOB on Exertion] : no shortness of breath during exertion [Abdominal Pain] : no abdominal pain [Vomiting] : no vomiting [Constipation] : no constipation [Diarrhea] : no diarrhea [Reflux/Heartburn] : no reflex/heartburn [FreeTextEntry2] : weight stable

## 2020-01-09 NOTE — PHYSICAL EXAM
[Obese, well nourished, in no acute distress] : obese, well nourished, in no acute distress [Normal] : affect appropriate [de-identified] : normoactive bowel sounds, soft and non tender, no hepatosplenomegaly or masses appreciated.

## 2020-01-09 NOTE — HISTORY OF PRESENT ILLNESS
[de-identified] : 39 year old M undergoing workup for laparoscopic sleeve gastrectomy here for PRE OP  VISIT. Weight stable since last visit.  Patient is currently in the process of completing his workup as well as a medically supervised diet. Patient continues to make efforts to improve food choices and increased activity.Pt is following a protein focused diet - 3 meals a day - consuming a sufficient amount of zero calorie liquid.  Patient knows he needs to lose weight prior to surgery.Exercising regularly as recommended.All questions were answered.Discussed and reviewed further requirements needed prior to scheduling surgery. \par

## 2020-01-13 ENCOUNTER — APPOINTMENT (OUTPATIENT)
Dept: BARIATRICS/WEIGHT MGMT | Facility: CLINIC | Age: 40
End: 2020-01-13
Payer: COMMERCIAL

## 2020-01-13 VITALS
WEIGHT: 315 LBS | SYSTOLIC BLOOD PRESSURE: 130 MMHG | HEIGHT: 76 IN | OXYGEN SATURATION: 96 % | DIASTOLIC BLOOD PRESSURE: 80 MMHG | HEART RATE: 110 BPM | BODY MASS INDEX: 38.36 KG/M2

## 2020-01-13 PROCEDURE — 99214 OFFICE O/P EST MOD 30 MIN: CPT

## 2020-01-13 RX ORDER — CANAGLIFLOZIN 100 MG/1
100 TABLET, FILM COATED ORAL
Qty: 90 | Refills: 3 | Status: DISCONTINUED | COMMUNITY
Start: 2018-06-19 | End: 2020-01-13

## 2020-01-13 NOTE — HISTORY OF PRESENT ILLNESS
[FreeTextEntry1] : This is a 39 year old male  present in office today for followup visit. \par \par Based on last vist with weight management, patient lost 8 pounds\par He follows with RD and surgery, he still wants to proceed with bariatric surgery. According to patient, he needs to lose another 12 pounds for Dr Savage to schedule \par He increased topiramate to 75mg QHS, and he states he feels more tired the next morning.  Continues other medications without issues. He has not been taking invokana as his insurance company is not covering. Plans to see PCP next month, for blood work as well \par He drinks at least one MR shake a day,  and trying to have 2 meals in addition to the shake.  In the last 3 days he has been "dealing with stuff" so he has not been eating, and on occasion would not be able to hold down his food. This is not new, if he is dealing with something. Denies abdominal pain.  \par He has not been sleeping well the last few days either, but prior, he has been using his CPAP more regularly. \par He feels he has been more active, going up and down stairs, going food shopping for himself. Has not joined a gym yet, plans to this weekend \par \par \par \par \par

## 2020-01-13 NOTE — ASSESSMENT
[FreeTextEntry1] : BARIATRIC SURGERY HISTORY: none\par OBESITY CO-MORBIDITIES: MADELYN,  prediabetes\par ANTI-OBESITY MEDICATIONS: trulicity, belviq, metformin, topiramate \par OBESITY MEDICATION SIDE EFFECTS: none \par  \par \par Plan: \par \par continue to f/u with RD- shake for breakfast, ideally larger lunch and smaller dinner. maybe consider 2 MR shakes and 1 meal. \par increase exercise, something physical daily. join gym\par Continue current medication regime, recommend he take topiramate 75mg PO earlier in the evening to see if he feels less tired the next day. If he still remains too lethargic, drop back down to 50mg . Safety while he drives is priority.   Will not consider phentermine at this time b/c patient's elevated heart rate and BP. \par Wear CPAP nightly, goal >6 hours a night. \par \par RTO 6 weeks

## 2020-02-12 RX ORDER — LORCASERIN HYDROCHLORIDE HEMIHYDRATE 20 MG/1
20 TABLET, FILM COATED, EXTENDED RELEASE ORAL
Qty: 30 | Refills: 2 | Status: DISCONTINUED | COMMUNITY
Start: 2019-02-05 | End: 2020-02-12

## 2020-02-14 ENCOUNTER — RX RENEWAL (OUTPATIENT)
Age: 40
End: 2020-02-14

## 2020-02-20 ENCOUNTER — APPOINTMENT (OUTPATIENT)
Dept: BARIATRICS/WEIGHT MGMT | Facility: CLINIC | Age: 40
End: 2020-02-20
Payer: SELF-PAY

## 2020-02-20 ENCOUNTER — APPOINTMENT (OUTPATIENT)
Dept: BARIATRICS | Facility: CLINIC | Age: 40
End: 2020-02-20
Payer: COMMERCIAL

## 2020-02-20 VITALS
OXYGEN SATURATION: 94 % | BODY MASS INDEX: 38.36 KG/M2 | HEIGHT: 76 IN | WEIGHT: 315 LBS | SYSTOLIC BLOOD PRESSURE: 126 MMHG | DIASTOLIC BLOOD PRESSURE: 84 MMHG | HEART RATE: 93 BPM

## 2020-02-20 PROCEDURE — 99214 OFFICE O/P EST MOD 30 MIN: CPT

## 2020-02-20 PROCEDURE — 97803 MED NUTRITION INDIV SUBSEQ: CPT

## 2020-02-21 VITALS — BODY MASS INDEX: 38.36 KG/M2 | HEIGHT: 76 IN | WEIGHT: 315 LBS

## 2020-02-25 NOTE — REVIEW OF SYSTEMS
[Recent Change In Weight] : ~T recent weight change [Negative] : Heme/Lymph [Fever] : no fever [Chills] : no chills [Dysphagia] : no dysphagia [Chest Pain] : no chest pain [Lower Ext Edema] : no lower extremity edema [Shortness Of Breath] : no shortness of breath [SOB on Exertion] : no shortness of breath during exertion [Cough] : no cough [Abdominal Pain] : no abdominal pain [Vomiting] : no vomiting [Constipation] : no constipation [Diarrhea] : no diarrhea [Reflux/Heartburn] : no reflex/heartburn [FreeTextEntry2] : weight loss since last visit.

## 2020-02-25 NOTE — ASSESSMENT
[FreeTextEntry1] : 39 year old M undergoing workup for laparoscopic sleeve gastrectomy here for PRE OP  VISIT. Weight loss  since last visit.  Patient is currently in the process of completing his workup as well as a medically supervised diet.\par \par Pre op education classes completed. \par Nutrition one on one scheduled next week - on go until surgery  Psych - completed. \par Completed 6 weigh in visits. Pt will need additional weigh in visits prior to surgery. Patient is aware he needs to lose weight prior to surgery. \par Upper EGD - to be scheduled. \par Needs letter of support/ diet history / routine labs performed. \par Appointments  and testing with cardiology /pulmonary scheduled.\par Will need Endocrine clearance prior to surgery. \par \par Nutritional counseling has been provided. The patient is encouraged to remain calorie conscious and continue a low fat, low carbohydrate, protein focus diet. Pt encouraged to participate in a daily exercise regimen incorporating cardio and strength training. \par \par Dr. Viveros saw patient today. \par \par Team Meeting scheduled for tomorrow. \par \par Return to office in 4  weeks or earlier if any concerns. Will see nutritionist same day next month - March 2020.\par

## 2020-02-25 NOTE — PHYSICAL EXAM
[Obese, well nourished, in no acute distress] : obese, well nourished, in no acute distress [Normal] : grossly intact [de-identified] : normoactive bowel sounds, soft and non tender, no hepatosplenomegaly or masses appreciated.

## 2020-02-25 NOTE — HISTORY OF PRESENT ILLNESS
[de-identified] : 39 year old M undergoing workup for laparoscopic sleeve gastrectomy here for PRE OP  VISIT. Weight loss since last visit. Patient is currently in the process of completing his workup as well as a medically supervised diet. Patient continues to make efforts to improve food choices and increased activity.Pt is following a protein focused diet - 3 meals a day - consuming a sufficient amount of zero calorie liquid.  Patient knows he needs to lose weight prior to surgery.Exercising regularly as recommended.All questions were answered.Discussed and reviewed further requirements needed prior to scheduling surgery. \par

## 2020-02-28 ENCOUNTER — TRANSCRIPTION ENCOUNTER (OUTPATIENT)
Age: 40
End: 2020-02-28

## 2020-03-09 ENCOUNTER — APPOINTMENT (OUTPATIENT)
Dept: BARIATRICS/WEIGHT MGMT | Facility: CLINIC | Age: 40
End: 2020-03-09
Payer: COMMERCIAL

## 2020-03-09 VITALS
SYSTOLIC BLOOD PRESSURE: 122 MMHG | HEART RATE: 122 BPM | HEIGHT: 76 IN | WEIGHT: 315 LBS | DIASTOLIC BLOOD PRESSURE: 90 MMHG | OXYGEN SATURATION: 97 % | BODY MASS INDEX: 38.36 KG/M2

## 2020-03-09 VITALS — HEART RATE: 114 BPM

## 2020-03-09 PROCEDURE — 99214 OFFICE O/P EST MOD 30 MIN: CPT

## 2020-03-09 NOTE — ADDENDUM
[FreeTextEntry1] : Noted call center note 2/29, when asked today if patient was sick, patient denied any symptoms

## 2020-03-09 NOTE — HISTORY OF PRESENT ILLNESS
[FreeTextEntry1] : This is a 39 year old male  present in office today for followup visit. \par \par patient down 3 pounds\par He follows with RD and surgery, he still wants to proceed with bariatric surgery. \par Bernard was DC'd and he has increased  topiramate to 100mg QHS. He states it helps him sleep. \par Continues other medications without issues.\par He states he has been eating 2-3 meals a day.\par B: 2-3 pieces of Fruit for breakfast, sometimes protein shake with 1 piece of fruit, or 1/2 savage, egg,cheese\par L: 1/2 tuna sandwich\par D: hamburger\par He states he eats red meat/processed meats 2x/week\par He skip lunch 2-3x/week because either he is busy or he is not hungry  \par \par He has not been using his CPAP regularly, states he falls asleep before putting it on\par No regular exercise \par \par \par \par \par 
Statement Selected

## 2020-03-09 NOTE — ASSESSMENT
[FreeTextEntry1] : BARIATRIC SURGERY HISTORY: none\par OBESITY CO-MORBIDITIES: MADELYN,  prediabetes\par ANTI-OBESITY MEDICATIONS: trulicity, metformin, topiramate \par OBESITY MEDICATION SIDE EFFECTS: none \par  \par \par Plan: \par \par continue to f/u with RD and bariatric team \par Avoid red meat/processed meats. Aim for 3 meals a day or 2 meals and a protein shake \par encouraged regular exercise \par Continue current medication regime. Will not consider phentermine at this time b/c patient's elevated heart rate and BP. \par Wear CPAP nightly, goal >6 hours a night. Discussed the importance of treating MADELYN \par \par \par RTO 6 weeks

## 2020-03-31 ENCOUNTER — NON-APPOINTMENT (OUTPATIENT)
Age: 40
End: 2020-03-31

## 2020-03-31 ENCOUNTER — APPOINTMENT (OUTPATIENT)
Dept: BARIATRICS/WEIGHT MGMT | Facility: CLINIC | Age: 40
End: 2020-03-31

## 2020-03-31 ENCOUNTER — APPOINTMENT (OUTPATIENT)
Dept: BARIATRICS | Facility: CLINIC | Age: 40
End: 2020-03-31
Payer: COMMERCIAL

## 2020-03-31 ENCOUNTER — APPOINTMENT (OUTPATIENT)
Dept: BARIATRICS | Facility: HOSPITAL | Age: 40
End: 2020-03-31

## 2020-03-31 VITALS — HEIGHT: 76 IN | WEIGHT: 315 LBS | BODY MASS INDEX: 38.36 KG/M2

## 2020-03-31 PROCEDURE — 99214 OFFICE O/P EST MOD 30 MIN: CPT | Mod: 95

## 2020-03-31 NOTE — ASSESSMENT
[FreeTextEntry1] : 39 year old male with longstanding history of obesity undergoing preoperative workup for laparoscopic sleeve gastrectomy here today for follow up visit. Patient maintained weight since last visit and will need to continue to lose weight prior to surgery. He will continue to work with medical weight management to lose weight. Nutrition and exercise guidelines were reviewed with the patient. It was recommended that his meals be protein rich and if he is physically hungry after dinner, he have a protein rich snack instead of cereal. Also, discussed option of going to house in [place]. Procedure and risks reviewed. All questions answered.  \par \par Weight loss\par Consider going to [place]\par Continue to see obesity medicine specialist and nutritionist\par Follow up in 1 month with nutritionist appointment same day\par Call with any questions or concerns.

## 2020-03-31 NOTE — REVIEW OF SYSTEMS
[Negative] : Heme/Lymph [Fever] : no fever [Chills] : no chills [Recent Change In Weight] : ~T no recent weight change [Dysphagia] : no dysphagia [Hoarseness] : no hoarseness [Chest Pain] : no chest pain [Palpitations] : no palpitations [Lower Ext Edema] : no lower extremity edema [Shortness Of Breath] : no shortness of breath [Abdominal Pain] : no abdominal pain [Vomiting] : no vomiting [Constipation] : no constipation [Diarrhea] : no diarrhea [Reflux/Heartburn] : no reflex/heartburn

## 2020-03-31 NOTE — PHYSICAL EXAM
[Obese, well nourished, in no acute distress] : obese, well nourished, in no acute distress [Normal] : affect appropriate [de-identified] : EOMI, anicteric

## 2020-03-31 NOTE — HISTORY OF PRESENT ILLNESS
[Home] : at home, [unfilled] , at the time of the visit. [Other Location: e.g. Home (Enter Location, City,State)___] : at [unfilled] [Patient] : the patient [Self] : self [de-identified] : 39 year old male with longstanding history of morbid obesity undergoing workup for laparoscopic sleeve gastrectomy presents today for follow up visit. Patient maintained weight since last visit. He continues to work with medical weight management to assist with preop weight loss. Based on brief diet recall, he is having three meals a day 5/7 days a week. Other days he misses one meal. Lately, he is have more carb focused meals and after dinner around 7:30, he will have a bowl of cereal. With current situation and working less, he has less physical activity. He is planning to go into work and use their gym equipment.

## 2020-04-03 ENCOUNTER — RX RENEWAL (OUTPATIENT)
Age: 40
End: 2020-04-03

## 2020-04-27 ENCOUNTER — APPOINTMENT (OUTPATIENT)
Dept: BARIATRICS/WEIGHT MGMT | Facility: CLINIC | Age: 40
End: 2020-04-27
Payer: COMMERCIAL

## 2020-04-27 PROCEDURE — 99442: CPT

## 2020-04-29 VITALS — HEIGHT: 76 IN | WEIGHT: 315 LBS | BODY MASS INDEX: 38.36 KG/M2

## 2020-05-05 ENCOUNTER — APPOINTMENT (OUTPATIENT)
Dept: BARIATRICS | Facility: CLINIC | Age: 40
End: 2020-05-05
Payer: COMMERCIAL

## 2020-05-05 VITALS — HEIGHT: 76 IN | WEIGHT: 315 LBS | BODY MASS INDEX: 38.36 KG/M2

## 2020-05-05 PROCEDURE — 99213 OFFICE O/P EST LOW 20 MIN: CPT | Mod: 95

## 2020-05-06 NOTE — HISTORY OF PRESENT ILLNESS
[Home] : at home, [unfilled] , at the time of the visit. [Medical Office: ([facility])___] : at the medical office located in  [Patient] : the patient [Self] : self [de-identified] : 39 year old male with longstanding history of morbid obesity undergoing workup for laparoscopic sleeve gastrectomy presents today for follow up visit. Patient maintained weight since last visit. He decided to not move to [place] during COVID 19 pandemic. He continues to work with medical weight management to assist with preop weight loss. Based on brief diet recall, he is having three meals a day now 4/7 days a week. Other days he misses one meal - either breakfast or lunch. He states that when he is busy with house work during the day he doesn’t stop to have a meal. He is no longer having cereal after dinner. With current situation and working less, he is trying to be more active around the house. Patient is not sleeping well. Sleep schedule is not consistent.

## 2020-05-06 NOTE — ASSESSMENT
[FreeTextEntry1] : 39 year old male with longstanding history of obesity undergoing preoperative workup for laparoscopic sleeve gastrectomy here today for follow up visit. Patient maintained weight since last visit and will need to continue to lose weight prior to surgery. He will continue to work with medical weight management to lose weight. Nutrition and exercise guidelines were reviewed with the patient. It was recommended that he has three protein rich meals daily. Also, discussed sleeping habits. Will send exercise packet.  Procedure and risks reviewed. All questions answered.  \par \par Weight loss\par Continue to see obesity medicine specialist and nutritionist\par Follow up in 1 month\par Call with any questions or concerns.

## 2020-05-06 NOTE — PHYSICAL EXAM
[Obese, well nourished, in no acute distress] : obese, well nourished, in no acute distress [Normal] : affect appropriate [de-identified] : EOMI, anicteric

## 2020-05-15 ENCOUNTER — APPOINTMENT (OUTPATIENT)
Dept: BARIATRICS/WEIGHT MGMT | Facility: CLINIC | Age: 40
End: 2020-05-15
Payer: COMMERCIAL

## 2020-05-15 PROCEDURE — 97803 MED NUTRITION INDIV SUBSEQ: CPT | Mod: 95

## 2020-05-19 ENCOUNTER — APPOINTMENT (OUTPATIENT)
Dept: BARIATRICS/WEIGHT MGMT | Facility: CLINIC | Age: 40
End: 2020-05-19
Payer: COMMERCIAL

## 2020-05-19 VITALS — BODY MASS INDEX: 38.36 KG/M2 | HEIGHT: 76 IN | WEIGHT: 315 LBS

## 2020-05-19 VITALS — BODY MASS INDEX: 63.42 KG/M2 | WEIGHT: 315 LBS

## 2020-05-19 PROCEDURE — 99214 OFFICE O/P EST MOD 30 MIN: CPT | Mod: 95

## 2020-05-20 NOTE — HISTORY OF PRESENT ILLNESS
[Home] : at home, [unfilled] , at the time of the visit. [Medical Office: ([facility])___] : at the medical office located in  [Verbal consent obtained from patient] : the patient, [unfilled] [FreeTextEntry1] : Due to COVID-19 restrictions, unable to see patient in office, patient consented to telehealth visit \par \par Weight mostly unchanged, possibly 2 pounds down from visit with surgery. \par He has been seeing RD regularly as well. He states he is typically having 2 meals a day, dinner is most consistent meal. He feels his appetite has come back since stopping belviq, but denies any snacking in between meals or after dinner. Plus he has lost weight since DC'ing belviq. He reports ginger ale 3x/week otherwise unsweetened beverages. \par He has not been working at this time. He states he is getting exercise while working in the back yard Ex: power washing deck. He has not been tracking steps, or going out for designated walks. \par \par \par

## 2020-05-20 NOTE — ASSESSMENT
[FreeTextEntry1] : Plan: \par Continue preparing food at home\par decrease/avoid sweetened beverages \par continue to follow with surgery and RD\par main focus is exercise. recommended he dl step counter on phone and wear it while he is doing yard work. he denies feeling "winded" when he is in back, therefore he needs to be more physical. recommended he start with 10-15 walk. \par continue current medication regiment for now, will order blood work on next visit, possibly DC metformin (decrease risk of metabolic acidosis as pt is on topiramate as well)\par when we have surgery date, may add wellbutrin for metabolism boost but ideally would prefer patient to exercise more. \par \par f/u 2-3 weeks

## 2020-06-08 ENCOUNTER — RX RENEWAL (OUTPATIENT)
Age: 40
End: 2020-06-08

## 2020-06-08 ENCOUNTER — APPOINTMENT (OUTPATIENT)
Dept: BARIATRICS | Facility: CLINIC | Age: 40
End: 2020-06-08
Payer: COMMERCIAL

## 2020-06-08 VITALS — WEIGHT: 315 LBS | BODY MASS INDEX: 38.36 KG/M2 | HEIGHT: 76 IN

## 2020-06-08 DIAGNOSIS — Z87.898 PERSONAL HISTORY OF OTHER SPECIFIED CONDITIONS: ICD-10-CM

## 2020-06-08 PROCEDURE — 99214 OFFICE O/P EST MOD 30 MIN: CPT | Mod: 95

## 2020-06-08 NOTE — ASSESSMENT
[FreeTextEntry1] : 38 yo M with longstanding history of morbid obesity undergoing workup for laparoscopic sleeve gastrectomy presents today for follow up TELEMEDICINE  visit. Weight stable last visit.Current weight is 521 lbs.  Pt states he has not worked since March 2020 due to the Covid 19 Pandemic . He continues to work with medical weight management to assist with preop weight loss.Hx of MADELYN \par \par Discussed and reviewed nutritional guidelines and weight loss strategies- Discussed with patient following 3 meal a day regimen .\par Scheduled visit  with Marija Zhu NP tomorrow.\par Follow up visit with Dot MORRIS scheduled on 6/26/2020. \par Continue to see obesity medicine specialist.\par Follow up in 1 month- TELEMEDICINE Visit. \par Call with any questions or concerns.

## 2020-06-08 NOTE — REASON FOR VISIT
[Home] : at home, [unfilled] , at the time of the visit. [Medical Office: ([facility])___] : at the medical office located in  [Verbal consent obtained from patient] : the patient, [unfilled] [Follow-Up Visit] : a follow-up visit for [Morbid Obesity (BMI>40)] : morbid obesity (bmi>40)

## 2020-06-08 NOTE — HISTORY OF PRESENT ILLNESS
[Home] : at home, [unfilled] , at the time of the visit. [Medical Office: ([facility])___] : at the medical office located in  [Patient] : the patient [Self] : self [de-identified] : 40 yo M with longstanding history of morbid obesity undergoing workup for laparoscopic sleeve gastrectomy presents today for follow up TELEMEDICINE visit. Weight stable last visit.Current weight is 521 lbs. Pt states he has not worked since March 2020 due to the Covid 19 Pandemic . He continues to work with medical weight management to assist with preop weight loss. Pt has scheduled visit with Marija Zhu NP tomorrow to discuss starting medication to help with weight loss. Pt was recently taken off of Belviq - due to long term side effects. Pt  states he consumes 2 meals per day on most days / usually skipping one meal per day . Pt states meal that he skips varies from day to day.Patient denies snacking between meals or after dinner. Pt continues to work on being more active around the house. Patient reports continued poor sleep quality- states at times his CPAP will come off at night . Plan to increase daily exercise incorporating cardio and strength training.

## 2020-06-08 NOTE — REVIEW OF SYSTEMS
[Negative] : Endocrine [Fever] : no fever [Chills] : no chills [Recent Change In Weight] : ~T no recent weight change [Dysphagia] : no dysphagia [Chest Pain] : no chest pain [Palpitations] : no palpitations [Shortness Of Breath] : no shortness of breath [Wheezing] : no wheezing [Cough] : no cough [Abdominal Pain] : no abdominal pain [Vomiting] : no vomiting [Constipation] : no constipation [Reflux/Heartburn] : no reflex/heartburn [Hernia] : no hernia [FreeTextEntry2] : weight stable

## 2020-06-09 ENCOUNTER — APPOINTMENT (OUTPATIENT)
Dept: BARIATRICS/WEIGHT MGMT | Facility: CLINIC | Age: 40
End: 2020-06-09
Payer: COMMERCIAL

## 2020-06-09 PROCEDURE — 99214 OFFICE O/P EST MOD 30 MIN: CPT | Mod: 95

## 2020-06-10 NOTE — HISTORY OF PRESENT ILLNESS
[Home] : at home, [unfilled] , at the time of the visit. [Medical Office: ([facility])___] : at the medical office located in  [Verbal consent obtained from patient] : the patient, [unfilled] [FreeTextEntry1] : Due to COVID-19 restrictions, unable to see patient in office, patient consented to telehealth visit \par \par Weight mostly unchanged. He sees the bariatric team regularly. No change in diet, dinner remains his largest meal. \par He has not been working at this time. He states he is getting exercise while working in the back yard, preparing the pool. he plans to start swimming when it gets warmer. He has not been tracking steps\par Patient states he goes to bed with CPAP on nightly, but it will come over in the middle of the night 2-3x/week. \par He continues with metformin, topiramate, and trulicty \par patient is inquiring about another medication \par \par

## 2020-06-10 NOTE — ASSESSMENT
[FreeTextEntry1] : BARIATRIC SURGERY HISTORY: none\par OBESITY CO-MORBIDITIES: MADELYN, prediabetes\par ANTI-OBESITY MEDICATIONS: trulicity, metformin, topiramate \par OBESITY MEDICATION SIDE EFFECTS: none \par \par \par Plan: \par Continue preparing food at home\par ideally 3 meals a day, dinner being smallest meal\par continue to follow with surgery and RD\par wear step counter, and start exercising\par continue current medication regiment for now, patient will call PCP for annual and blood work, possibly DC metformin (decrease risk of metabolic acidosis as pt is on topiramate as well)\par when we have surgery date, may add wellbutrin for metabolism boost but ideally would prefer patient to exercise more. \par \par f/u 2-3 weeks

## 2020-06-19 ENCOUNTER — APPOINTMENT (OUTPATIENT)
Dept: FAMILY MEDICINE | Facility: CLINIC | Age: 40
End: 2020-06-19
Payer: COMMERCIAL

## 2020-06-19 VITALS
DIASTOLIC BLOOD PRESSURE: 80 MMHG | TEMPERATURE: 97.9 F | HEART RATE: 92 BPM | SYSTOLIC BLOOD PRESSURE: 126 MMHG | OXYGEN SATURATION: 98 % | HEIGHT: 76 IN | WEIGHT: 315 LBS | BODY MASS INDEX: 38.36 KG/M2 | RESPIRATION RATE: 14 BRPM

## 2020-06-19 PROCEDURE — 99214 OFFICE O/P EST MOD 30 MIN: CPT | Mod: 25

## 2020-06-19 PROCEDURE — 36415 COLL VENOUS BLD VENIPUNCTURE: CPT

## 2020-06-22 LAB
25(OH)D3 SERPL-MCNC: 28.5 NG/ML
ALBUMIN SERPL ELPH-MCNC: 4.3 G/DL
ALP BLD-CCNC: 76 U/L
ALT SERPL-CCNC: 28 U/L
ANION GAP SERPL CALC-SCNC: 17 MMOL/L
AST SERPL-CCNC: 18 U/L
BASOPHILS # BLD AUTO: 0.06 K/UL
BASOPHILS NFR BLD AUTO: 0.6 %
BILIRUB SERPL-MCNC: 0.3 MG/DL
BUN SERPL-MCNC: 14 MG/DL
C PEPTIDE SERPL-MCNC: 3.8 NG/ML
CALCIUM SERPL-MCNC: 9.4 MG/DL
CHLORIDE SERPL-SCNC: 103 MMOL/L
CHOLEST SERPL-MCNC: 159 MG/DL
CHOLEST/HDLC SERPL: 4.7 RATIO
CO2 SERPL-SCNC: 22 MMOL/L
CREAT SERPL-MCNC: 1 MG/DL
CREAT SPEC-SCNC: 123 MG/DL
EOSINOPHIL # BLD AUTO: 0.06 K/UL
EOSINOPHIL NFR BLD AUTO: 0.6 %
ESTIMATED AVERAGE GLUCOSE: 111 MG/DL
FERRITIN SERPL-MCNC: 237 NG/ML
FOLATE SERPL-MCNC: 10.7 NG/ML
GLUCOSE SERPL-MCNC: 88 MG/DL
HBA1C MFR BLD HPLC: 5.5 %
HCT VFR BLD CALC: 46.9 %
HDLC SERPL-MCNC: 34 MG/DL
HGB BLD-MCNC: 13.9 G/DL
IMM GRANULOCYTES NFR BLD AUTO: 0.3 %
IRON SATN MFR SERPL: 23 %
IRON SERPL-MCNC: 58 UG/DL
LDLC SERPL CALC-MCNC: 108 MG/DL
LYMPHOCYTES # BLD AUTO: 2.19 K/UL
LYMPHOCYTES NFR BLD AUTO: 20.4 %
MAN DIFF?: NORMAL
MCHC RBC-ENTMCNC: 25 PG
MCHC RBC-ENTMCNC: 29.6 GM/DL
MCV RBC AUTO: 84.2 FL
MICROALBUMIN 24H UR DL<=1MG/L-MCNC: <1.2 MG/DL
MICROALBUMIN/CREAT 24H UR-RTO: NORMAL MG/G
MONOCYTES # BLD AUTO: 0.54 K/UL
MONOCYTES NFR BLD AUTO: 5 %
NEUTROPHILS # BLD AUTO: 7.84 K/UL
NEUTROPHILS NFR BLD AUTO: 73.1 %
PLATELET # BLD AUTO: 270 K/UL
POTASSIUM SERPL-SCNC: 3.9 MMOL/L
PROT SERPL-MCNC: 7.8 G/DL
PSA SERPL-MCNC: 0.18 NG/ML
RBC # BLD: 5.57 M/UL
RBC # FLD: 15.7 %
SODIUM SERPL-SCNC: 142 MMOL/L
T4 SERPL-MCNC: 6.6 UG/DL
TIBC SERPL-MCNC: 256 UG/DL
TRIGL SERPL-MCNC: 86 MG/DL
TSH SERPL-ACNC: 1.82 UIU/ML
UIBC SERPL-MCNC: 198 UG/DL
VIT B12 SERPL-MCNC: 521 PG/ML
WBC # FLD AUTO: 10.72 K/UL

## 2020-06-26 ENCOUNTER — APPOINTMENT (OUTPATIENT)
Dept: BARIATRICS/WEIGHT MGMT | Facility: CLINIC | Age: 40
End: 2020-06-26
Payer: SELF-PAY

## 2020-06-26 VITALS — BODY MASS INDEX: 38.36 KG/M2 | WEIGHT: 315 LBS | HEIGHT: 76 IN

## 2020-06-26 PROCEDURE — 97803 MED NUTRITION INDIV SUBSEQ: CPT | Mod: 95

## 2020-06-30 ENCOUNTER — APPOINTMENT (OUTPATIENT)
Dept: BARIATRICS/WEIGHT MGMT | Facility: CLINIC | Age: 40
End: 2020-06-30
Payer: COMMERCIAL

## 2020-06-30 PROCEDURE — 99214 OFFICE O/P EST MOD 30 MIN: CPT | Mod: 95

## 2020-06-30 NOTE — HISTORY OF PRESENT ILLNESS
[Home] : at home, [unfilled] , at the time of the visit. [Medical Office: ([facility])___] : at the medical office located in  [Verbal consent obtained from patient] : the patient, [unfilled] [FreeTextEntry1] : Due to COVID-19 restrictions, unable to see patient in office, patient consented to telehealth visit \par \par Weight down 2 pounds, 518 pounds at home. He sees the bariatric team regularly. \par He has been eating 2 meals more consistently, and trying to make dinner smaller. HE usually skips breakfast or else he will have a protein shake. \par He has been swimming/running in pool 3-4x/week. \par Patient states he goes to bed with CPAP on nightly, but it will come over in the middle of the night 2-3x/week. \par He continues with metformin, topiramate, and trulicty \par patient is inquiring about another medication \par \par

## 2020-06-30 NOTE — ASSESSMENT
[FreeTextEntry1] : BARIATRIC SURGERY HISTORY: none\par OBESITY CO-MORBIDITIES: MADELYN, prediabetes\par ANTI-OBESITY MEDICATIONS: trulicity, metformin, topiramate \par OBESITY MEDICATION SIDE EFFECTS: none \par \par \par Plan: \par Continue preparing food at home\par ideally 3 meals a day, dinner being smallest meal\par continue to follow with surgery and RD\par continue swimming regularly, should increase cardio \par continue current medication regiment (reviewed blood work)\par when we have surgery date, may add wellbutrin for metabolism boost but ideally would prefer patient to exercise more. \par f/u with surgery \par \par f/u 3 weeks

## 2020-07-08 ENCOUNTER — APPOINTMENT (OUTPATIENT)
Dept: BARIATRICS | Facility: CLINIC | Age: 40
End: 2020-07-08
Payer: COMMERCIAL

## 2020-07-08 VITALS — WEIGHT: 315 LBS | HEIGHT: 76 IN | BODY MASS INDEX: 38.36 KG/M2

## 2020-07-08 PROCEDURE — 99214 OFFICE O/P EST MOD 30 MIN: CPT | Mod: 95

## 2020-07-09 NOTE — ASSESSMENT
[FreeTextEntry1] : 40 yo M with longstanding history of morbid obesity undergoing workup for laparoscopic sleeve gastrectomy presents today for follow up TELEMEDICINE  visit. Weight loss last visit.Current weight is 518 lbs.  Pt states he has not worked since March 2020 due to the Covid 19 Pandemic . He continues to work with medical weight management to assist with preop weight loss.Hx of MADELYN \par \par Discussed and reviewed nutritional guidelines and weight loss strategies- Discussed with patient following 3 meal a day regimen .\par Scheduled visit  with Marija Zhu NP - follow up  7/22/2020 Follow up nutritionist - -7/31/2020 \par Follow up with PCP on 7/21/2020. \par Continue to see obesity medicine specialist.\par Follow up in 1 month- TELEMEDICINE Visit. \par Call with any questions or concerns.

## 2020-07-09 NOTE — REVIEW OF SYSTEMS
[Negative] : Endocrine [Fever] : no fever [Chills] : no chills [Recent Change In Weight] : ~T no recent weight change [Chest Pain] : no chest pain [Dysphagia] : no dysphagia [Palpitations] : no palpitations [Cough] : no cough [Wheezing] : no wheezing [Shortness Of Breath] : no shortness of breath [Vomiting] : no vomiting [Abdominal Pain] : no abdominal pain [Constipation] : no constipation [Hernia] : no hernia [Reflux/Heartburn] : no reflex/heartburn [FreeTextEntry2] : weight loss since last visit.

## 2020-07-09 NOTE — HISTORY OF PRESENT ILLNESS
[Home] : at home, [unfilled] , at the time of the visit. [Medical Office: ([facility])___] : at the medical office located in  [Patient] : the patient [Self] : self [de-identified] : 40 yo M with longstanding history of morbid obesity undergoing workup for laparoscopic sleeve gastrectomy presents today for follow up TELEMEDICINE visit. Weight stable last visit.Current weight is 518 lbs. Pt states he has not worked since March 2020 due to the Covid 19 Pandemic . He continues to work with medical weight management to assist with preop weight loss. Pt has scheduled visit with Marija Zhu NP at the end of the month. Pt was recently taken off of Belviq - due to long term side effects.Pt indicated that plan is to have patient restart a medication to help with weight loss prior to surgery.  Pt continues to work on consuming 3 meals a day - occasionally skipping 1 meal per day.  Pt states meal that he skips varies from day to day.Patient denies snacking between meals or after dinner. Pt continues to work on being more active around the house. Patient reports a slight improvement in sleep quality since last visit- states at times his CPAP will come off at night . Plan to increase daily exercise incorporating cardio and strength training.

## 2020-07-21 ENCOUNTER — APPOINTMENT (OUTPATIENT)
Dept: FAMILY MEDICINE | Facility: CLINIC | Age: 40
End: 2020-07-21
Payer: COMMERCIAL

## 2020-07-21 VITALS
WEIGHT: 315 LBS | BODY MASS INDEX: 38.36 KG/M2 | DIASTOLIC BLOOD PRESSURE: 86 MMHG | RESPIRATION RATE: 14 BRPM | SYSTOLIC BLOOD PRESSURE: 136 MMHG | OXYGEN SATURATION: 96 % | HEART RATE: 100 BPM | HEIGHT: 76 IN

## 2020-07-21 PROCEDURE — 99214 OFFICE O/P EST MOD 30 MIN: CPT

## 2020-07-22 ENCOUNTER — APPOINTMENT (OUTPATIENT)
Dept: BARIATRICS/WEIGHT MGMT | Facility: CLINIC | Age: 40
End: 2020-07-22
Payer: COMMERCIAL

## 2020-07-22 VITALS — BODY MASS INDEX: 38.36 KG/M2 | HEIGHT: 76 IN | WEIGHT: 315 LBS

## 2020-07-22 PROCEDURE — 99214 OFFICE O/P EST MOD 30 MIN: CPT | Mod: 95

## 2020-07-22 NOTE — HISTORY OF PRESENT ILLNESS
[FreeTextEntry1] : Due to COVID-19 restrictions, unable to see patient in office, patient consented to telehealth visit \par \par Patient's weight continues to trickle. He recently saw surgery, he does not have a surgery date yet. Uncertain how much more weight he should lose to set it. \par He continues trulicity, metformin, and topiramate. \par If patient has alcoholic beverage, he will not take his topiramate. \par He will have 1 drink at a BBQ (3 BBQ's so far this summer). Tolerating medication well. \par Recent blood work done, HA1C down to 5.5 \par \par He has not been swimming as regularly, but states he is more active now doing other things then he as been. \par \par He sees RD for nutrition [Home] : at home, [unfilled] , at the time of the visit. [Medical Office: ([facility])___] : at the medical office located in  [Verbal consent obtained from patient] : the patient, [unfilled]

## 2020-07-22 NOTE — ASSESSMENT
[FreeTextEntry1] : BARIATRIC SURGERY HISTORY: none\par OBESITY CO-MORBIDITIES: MADELYN, prediabetes\par ANTI-OBESITY MEDICATIONS: trulicity, metformin, topiramate \par OBESITY MEDICATION SIDE EFFECTS: none \par \par \par Plan: \par Continue preparing food at home\par continue to follow with surgery and RD\par encouraged to increase physical activity \par continue current medication regiment, will add wellbutrin 150mg PO daily, increase to 2 tabs after in office visit. \par discussed potential side effects, verbalized understanding \par \par f/u 2 weeks in person for PE and vitals.

## 2020-08-04 ENCOUNTER — APPOINTMENT (OUTPATIENT)
Dept: BARIATRICS/WEIGHT MGMT | Facility: CLINIC | Age: 40
End: 2020-08-04
Payer: COMMERCIAL

## 2020-08-04 VITALS
OXYGEN SATURATION: 97 % | DIASTOLIC BLOOD PRESSURE: 90 MMHG | WEIGHT: 315 LBS | HEART RATE: 95 BPM | BODY MASS INDEX: 38.36 KG/M2 | HEIGHT: 76 IN | SYSTOLIC BLOOD PRESSURE: 142 MMHG

## 2020-08-04 PROCEDURE — 99214 OFFICE O/P EST MOD 30 MIN: CPT

## 2020-08-06 ENCOUNTER — APPOINTMENT (OUTPATIENT)
Dept: BARIATRICS/WEIGHT MGMT | Facility: CLINIC | Age: 40
End: 2020-08-06
Payer: COMMERCIAL

## 2020-08-06 PROCEDURE — 97803 MED NUTRITION INDIV SUBSEQ: CPT | Mod: 95

## 2020-08-07 NOTE — HISTORY OF PRESENT ILLNESS
[FreeTextEntry1] : This is a 40 year old male  present in office today for followup visit. \par \par Patient's weight continues to trickle. He started Wellbutrin 1 week ago, tolerating it well. Denies any adverse side effects. Continues with topiramate, trulicity, and metformin. PCP would like for patient to remain on metformin for now. He discussed  with pharmacy, he does not have the one recalled.\par Discussed plan with surgery team, they would like for patient to be below 500 pounds to proceed. \par Informed patient of this plan. He appears determined to have surgery by October. \par In the past week he has been eating 3 meals (1 meal being a protein shake), even if he is not hungry. Dinner is still his largest meal but does not over eat/binge/or go for seconds. He will continue to see RD and discuss plan\par He has been swimming in the pool  2-3 days a week and states he is active with house work \par Patient has not been sleeping with CPAP regularly

## 2020-08-07 NOTE — ASSESSMENT
[FreeTextEntry1] : BARIATRIC SURGERY HISTORY: none\par OBESITY CO-MORBIDITIES: MADELYN, prediabetes\par ANTI-OBESITY MEDICATIONS: trulicity, metformin, topiramate, wellbutrin  \par OBESITY MEDICATION SIDE EFFECTS: none \par \par \par Plan: \par His plan is to mimic his behaviors as if he is in [place] since he consistently loses weight while he is down there.  \par Continue preparing food at home\par continue 3 meals a day, dinner being smallest meal\par continue swimming regularly, should increase cardio. \par continue to follow with surgery and RD\par continue current medication regiment\par Increase wellbutrin to 300mg po daily \par wear cpap nightly \par \par f/u 3 weeks TEB\par

## 2020-08-07 NOTE — PHYSICAL EXAM
[Normal] : RRR, normal S1S2, no murmurs, rubs, gallops [Obese, well nourished, in no acute distress] : obese, well nourished, in no acute distress

## 2020-08-10 VITALS — BODY MASS INDEX: 38.36 KG/M2 | WEIGHT: 315 LBS | HEIGHT: 76 IN

## 2020-08-31 ENCOUNTER — RX RENEWAL (OUTPATIENT)
Age: 40
End: 2020-08-31

## 2020-09-01 ENCOUNTER — APPOINTMENT (OUTPATIENT)
Dept: BARIATRICS/WEIGHT MGMT | Facility: CLINIC | Age: 40
End: 2020-09-01
Payer: SELF-PAY

## 2020-09-01 VITALS — BODY MASS INDEX: 38.36 KG/M2 | HEIGHT: 76 IN | WEIGHT: 315 LBS

## 2020-09-01 PROCEDURE — 97803 MED NUTRITION INDIV SUBSEQ: CPT | Mod: 95

## 2020-09-05 ENCOUNTER — RX RENEWAL (OUTPATIENT)
Age: 40
End: 2020-09-05

## 2020-09-15 ENCOUNTER — APPOINTMENT (OUTPATIENT)
Dept: BARIATRICS/WEIGHT MGMT | Facility: CLINIC | Age: 40
End: 2020-09-15
Payer: COMMERCIAL

## 2020-09-15 VITALS
DIASTOLIC BLOOD PRESSURE: 80 MMHG | HEART RATE: 89 BPM | WEIGHT: 315 LBS | SYSTOLIC BLOOD PRESSURE: 130 MMHG | HEIGHT: 76 IN | BODY MASS INDEX: 38.36 KG/M2

## 2020-09-15 PROCEDURE — 99214 OFFICE O/P EST MOD 30 MIN: CPT

## 2020-09-15 NOTE — HISTORY OF PRESENT ILLNESS
[FreeTextEntry1] : This is a 40 year old male  present in office today for followup visit. \par \par Patient has lost 6.5 pounds in 6 weeks. He is taking Wellbutrin 300mg,  topiramate 100mg, trulicity 1.5mg, and metformin, tolerating all medications well.  PCP would like for patient to remain on metformin for now. \par \par Patient has been having at least 1 protein shake a day and 2 meals. He seems to get full quickly so it is in question whether he will be consuming enough protein, betsy post surgery. He admits to have eaten more red meat lately due to summer bbqs, but he returned to lean protein as of this week. He try to make lunch his largest meal. \par  He will continue to see RD\par He has not been swimming but he has been walking 20 minutes every day.\par Patient has not been sleeping with CPAP regularly

## 2020-09-15 NOTE — ASSESSMENT
[FreeTextEntry1] : BARIATRIC SURGERY HISTORY: none\par OBESITY CO-MORBIDITIES: MADELYN, prediabetes\par ANTI-OBESITY MEDICATIONS: trulicity, metformin, topiramate, wellbutrin  \par OBESITY MEDICATION SIDE EFFECTS: none \par \par \par Plan: \par  \par focus on whole food, lean protein, avoid red meat. \par continue 3 meals a day, dinner being smallest meal\par increase walking to 30-40 minutes daily\par continue to follow with surgery and RD\par continue current medication regiment\par wear cpap nightly \par  goal BMI of 60 or less ( 10-15 pound weight loss)\par \par f/u 4 weeks\par

## 2020-10-16 ENCOUNTER — APPOINTMENT (OUTPATIENT)
Dept: BARIATRICS/WEIGHT MGMT | Facility: CLINIC | Age: 40
End: 2020-10-16
Payer: COMMERCIAL

## 2020-10-16 VITALS
SYSTOLIC BLOOD PRESSURE: 136 MMHG | BODY MASS INDEX: 38.36 KG/M2 | HEART RATE: 88 BPM | HEIGHT: 76 IN | DIASTOLIC BLOOD PRESSURE: 90 MMHG | WEIGHT: 315 LBS | OXYGEN SATURATION: 98 %

## 2020-10-16 PROCEDURE — 99214 OFFICE O/P EST MOD 30 MIN: CPT

## 2020-10-16 NOTE — ASSESSMENT
[FreeTextEntry1] : BARIATRIC SURGERY HISTORY: none\par OBESITY CO-MORBIDITIES: MADELYN, prediabetes\par ANTI-OBESITY MEDICATIONS: trulicity, metformin, topiramate, wellbutrin  \par OBESITY MEDICATION SIDE EFFECTS: none \par \par \par Plan: \par  \par focus on whole food, lean protein, avoid red meat. \par continue 3 meals a day, dinner being smallest meal\par increase walking to 30-40 minutes daily\par continue to follow with surgery and RD\par continue current medication regiment\par strongly advised to wear cpap nightly \par  goal BMI of 60 or less ( 10-15 pound weight loss)\par \par f/u 3-4 weeks\par

## 2020-10-16 NOTE — HISTORY OF PRESENT ILLNESS
[FreeTextEntry1] : This is a 40 year old male  present in office today for followup visit. \par \par Patient has lost 5 more pounds, total of 92 pounds. He is taking Wellbutrin 300mg,  topiramate 100mg, trulicity 1.5mg, and metformin, tolerating all medications well. Patient changed medical insurance, was informed he will need a prior auth for trulicity.  PCP would like for patient to remain on metformin for now. \par \par Patient has been eating 3 meals, focusing on lean proteins. \par He will continue to see RD\par He has increased his walking to 20-30 min a day \par Patient has not been sleeping with CPAP regularly

## 2020-10-29 ENCOUNTER — APPOINTMENT (OUTPATIENT)
Dept: FAMILY MEDICINE | Facility: CLINIC | Age: 40
End: 2020-10-29
Payer: COMMERCIAL

## 2020-10-29 VITALS
BODY MASS INDEX: 38.36 KG/M2 | HEART RATE: 8 BPM | TEMPERATURE: 97.2 F | WEIGHT: 315 LBS | RESPIRATION RATE: 13 BRPM | OXYGEN SATURATION: 98 % | DIASTOLIC BLOOD PRESSURE: 78 MMHG | SYSTOLIC BLOOD PRESSURE: 130 MMHG | HEIGHT: 76 IN

## 2020-10-29 PROCEDURE — 36415 COLL VENOUS BLD VENIPUNCTURE: CPT

## 2020-10-29 PROCEDURE — 99072 ADDL SUPL MATRL&STAF TM PHE: CPT

## 2020-10-29 PROCEDURE — 99214 OFFICE O/P EST MOD 30 MIN: CPT | Mod: 25

## 2020-11-02 LAB
25(OH)D3 SERPL-MCNC: 38.3 NG/ML
ALBUMIN SERPL ELPH-MCNC: 4 G/DL
ALP BLD-CCNC: 85 U/L
ALT SERPL-CCNC: 26 U/L
ANION GAP SERPL CALC-SCNC: 12 MMOL/L
APPEARANCE: CLEAR
AST SERPL-CCNC: 16 U/L
BASOPHILS # BLD AUTO: 0.07 K/UL
BASOPHILS NFR BLD AUTO: 0.7 %
BILIRUB SERPL-MCNC: 0.3 MG/DL
BILIRUBIN URINE: NEGATIVE
BLOOD URINE: NEGATIVE
BUN SERPL-MCNC: 14 MG/DL
C PEPTIDE SERPL-MCNC: 4.1 NG/ML
CALCIUM SERPL-MCNC: 8.8 MG/DL
CHLORIDE SERPL-SCNC: 103 MMOL/L
CHOLEST SERPL-MCNC: 146 MG/DL
CO2 SERPL-SCNC: 24 MMOL/L
COLOR: NORMAL
CREAT SERPL-MCNC: 1.09 MG/DL
CREAT SPEC-SCNC: 91 MG/DL
EOSINOPHIL # BLD AUTO: 0.06 K/UL
EOSINOPHIL NFR BLD AUTO: 0.6 %
ESTIMATED AVERAGE GLUCOSE: 120 MG/DL
FERRITIN SERPL-MCNC: 255 NG/ML
FOLATE SERPL-MCNC: 6 NG/ML
GLUCOSE QUALITATIVE U: NEGATIVE
GLUCOSE SERPL-MCNC: 90 MG/DL
HBA1C MFR BLD HPLC: 5.8 %
HCT VFR BLD CALC: 44.2 %
HDLC SERPL-MCNC: 33 MG/DL
HGB BLD-MCNC: 13.5 G/DL
IMM GRANULOCYTES NFR BLD AUTO: 0.3 %
IRON SATN MFR SERPL: 23 %
IRON SERPL-MCNC: 55 UG/DL
KETONES URINE: NEGATIVE
LDLC SERPL CALC-MCNC: 98 MG/DL
LEUKOCYTE ESTERASE URINE: NEGATIVE
LYMPHOCYTES # BLD AUTO: 2.05 K/UL
LYMPHOCYTES NFR BLD AUTO: 19.8 %
MAN DIFF?: NORMAL
MCHC RBC-ENTMCNC: 25.3 PG
MCHC RBC-ENTMCNC: 30.5 GM/DL
MCV RBC AUTO: 82.8 FL
MICROALBUMIN 24H UR DL<=1MG/L-MCNC: <1.2 MG/DL
MICROALBUMIN/CREAT 24H UR-RTO: NORMAL MG/G
MONOCYTES # BLD AUTO: 0.58 K/UL
MONOCYTES NFR BLD AUTO: 5.6 %
NEUTROPHILS # BLD AUTO: 7.54 K/UL
NEUTROPHILS NFR BLD AUTO: 73 %
NITRITE URINE: NEGATIVE
NONHDLC SERPL-MCNC: 113 MG/DL
PH URINE: 6.5
PLATELET # BLD AUTO: 274 K/UL
POTASSIUM SERPL-SCNC: 4 MMOL/L
PROT SERPL-MCNC: 7.5 G/DL
PROTEIN URINE: NEGATIVE
PSA SERPL-MCNC: 0.16 NG/ML
RBC # BLD: 5.34 M/UL
RBC # FLD: 15.2 %
SODIUM SERPL-SCNC: 139 MMOL/L
SPECIFIC GRAVITY URINE: 1.01
T4 SERPL-MCNC: 6.2 UG/DL
TIBC SERPL-MCNC: 241 UG/DL
TRIGL SERPL-MCNC: 76 MG/DL
TSH SERPL-ACNC: 1.99 UIU/ML
UIBC SERPL-MCNC: 185 UG/DL
UROBILINOGEN URINE: NORMAL
VIT B12 SERPL-MCNC: 611 PG/ML
WBC # FLD AUTO: 10.33 K/UL

## 2020-11-12 ENCOUNTER — APPOINTMENT (OUTPATIENT)
Dept: FAMILY MEDICINE | Facility: CLINIC | Age: 40
End: 2020-11-12

## 2020-11-13 ENCOUNTER — RX RENEWAL (OUTPATIENT)
Age: 40
End: 2020-11-13

## 2020-11-13 ENCOUNTER — APPOINTMENT (OUTPATIENT)
Dept: BARIATRICS/WEIGHT MGMT | Facility: CLINIC | Age: 40
End: 2020-11-13
Payer: COMMERCIAL

## 2020-11-13 VITALS
BODY MASS INDEX: 38.36 KG/M2 | HEIGHT: 76 IN | OXYGEN SATURATION: 99 % | HEART RATE: 95 BPM | DIASTOLIC BLOOD PRESSURE: 90 MMHG | SYSTOLIC BLOOD PRESSURE: 130 MMHG | WEIGHT: 315 LBS | TEMPERATURE: 97.1 F

## 2020-11-13 PROCEDURE — 99072 ADDL SUPL MATRL&STAF TM PHE: CPT

## 2020-11-13 PROCEDURE — 99214 OFFICE O/P EST MOD 30 MIN: CPT | Mod: 25

## 2020-11-13 NOTE — ASSESSMENT
[FreeTextEntry1] : BARIATRIC SURGERY HISTORY: none\par OBESITY CO-MORBIDITIES: MADELYN, prediabetes\par ANTI-OBESITY MEDICATIONS: trulicity, metformin, topiramate, wellbutrin \par OBESITY MEDICATION SIDE EFFECTS: none \par \par \par Plan: \par  \par focus on whole food, lean protein, avoid red meat and processed meats\par continue 3 meals a day, dinner being smallest meal. shake for breakfast\par continue regular exercise \par continue to follow with surgery and RD\par increase trulicity to 3mg qweekly, continue wellbutrin 300mg and topiramate 100mg \par continue to  wear cpap nightly \par  goal BMI of 60 or less (goal weight 493 or less)\par reviewed blood work, HA1C back in pre-dm range \par \par f/u 3-4 weeks\par

## 2020-11-13 NOTE — HISTORY OF PRESENT ILLNESS
[FreeTextEntry1] : This is a 40 year old male  present in office today for followup visit. \par \par Patient's weight remains the same. \par \par B: eggs and savage or sausage\par L: turkey/tuna sandwich \par D: lean protein\par He has not been drinking shakes for the last 2 weeks \par He was making soups 2 weeks ago: chicken or barley \par \par He is walking 3-4 days a week, 40 minutes\par \par He started wearing CPAP earlier this week\par

## 2020-11-18 ENCOUNTER — TRANSCRIPTION ENCOUNTER (OUTPATIENT)
Age: 40
End: 2020-11-18

## 2020-12-11 ENCOUNTER — APPOINTMENT (OUTPATIENT)
Dept: BARIATRICS/WEIGHT MGMT | Facility: CLINIC | Age: 40
End: 2020-12-11
Payer: COMMERCIAL

## 2020-12-11 VITALS
DIASTOLIC BLOOD PRESSURE: 90 MMHG | WEIGHT: 315 LBS | SYSTOLIC BLOOD PRESSURE: 130 MMHG | OXYGEN SATURATION: 98 % | TEMPERATURE: 97.2 F | BODY MASS INDEX: 38.36 KG/M2 | HEART RATE: 89 BPM | HEIGHT: 76 IN

## 2020-12-11 PROCEDURE — 99214 OFFICE O/P EST MOD 30 MIN: CPT

## 2020-12-11 NOTE — ASSESSMENT
[FreeTextEntry1] : BARIATRIC SURGERY HISTORY: none\par OBESITY CO-MORBIDITIES: MADELYN, prediabetes\par ANTI-OBESITY MEDICATIONS: trulicity, metformin, topiramate, wellbutrin \par OBESITY MEDICATION SIDE EFFECTS: none \par \par \par Plan: \par  \par focus on whole food, lean protein, avoid red meat and processed meats\par continue 3 meals a day, dinner being smallest meal. shake for breakfast\par increase exercise\par continue to follow with surgery and RD\par continue  trulicity to 3mg qweekly, continue wellbutrin 300mg and topiramate 100mg \par  may consider changing wellbutrin to phentermine\par continue to wear cpap nightly \par  goal BMI of 60 or less (goal weight 493 or less)\par \par f/u 3-4 weeks\par

## 2020-12-11 NOTE — HISTORY OF PRESENT ILLNESS
[FreeTextEntry1] : This is a 40 year old male  present in office today for followup visit. \par \par Down 1 pound, fat seems to be trending down\par Patient has increased trulicity to 3.0mg, he noticed change in appetite \par He is having 3 meals-combination of shakes and cooked meal. \par sleeping with cpap most nights \par he is active in his home, going up and down the stairs, but not participating in a regular exercise routine.

## 2020-12-14 ENCOUNTER — APPOINTMENT (OUTPATIENT)
Dept: BARIATRICS/WEIGHT MGMT | Facility: CLINIC | Age: 40
End: 2020-12-14
Payer: SELF-PAY

## 2020-12-14 VITALS — HEIGHT: 76 IN | WEIGHT: 315 LBS | BODY MASS INDEX: 38.36 KG/M2

## 2020-12-14 PROCEDURE — 97803 MED NUTRITION INDIV SUBSEQ: CPT | Mod: 95

## 2021-01-05 ENCOUNTER — APPOINTMENT (OUTPATIENT)
Dept: BARIATRICS | Facility: CLINIC | Age: 41
End: 2021-01-05
Payer: COMMERCIAL

## 2021-01-05 PROCEDURE — 99213 OFFICE O/P EST LOW 20 MIN: CPT | Mod: 95

## 2021-01-08 NOTE — REVIEW OF SYSTEMS
[Recent Change In Weight] : ~T recent weight change [Negative] : Endocrine [Fever] : no fever [Chills] : no chills [Dysphagia] : no dysphagia [Chest Pain] : no chest pain [Palpitations] : no palpitations [Shortness Of Breath] : no shortness of breath [Wheezing] : no wheezing [Cough] : no cough [Abdominal Pain] : no abdominal pain [Vomiting] : no vomiting [Constipation] : no constipation [Reflux/Heartburn] : no reflex/heartburn [Hernia] : no hernia [FreeTextEntry2] : weight loss (based on documented at last nutritionist appointment)

## 2021-01-08 NOTE — ASSESSMENT
[FreeTextEntry1] : 40 year old male with longstanding history of obesity now ready to undergo preoperative workup for laparoscopic sleeve gastrectomy here today for follow up visit. Based on last visit with nutritionist, patient has lost weight since last visit and is encouraged to continue to lose weight prior to surgery.   Nutrition and exercise guidelines were reviewed with the patient - stop drinking soda and juice. Procedure and risks reviewed. Encouraged to get COVID vaccine when available. All questions answered.\par \par Continue preoperative workup\par Get NEW scale\par Follow up with obesity medicine\par Follow up in 6 weeks\par Call with any questions or concerns.\par

## 2021-01-08 NOTE — HISTORY OF PRESENT ILLNESS
[de-identified] : 40 year old male with longstanding history of obesity undergoing workup for laparoscopic sleeve gastrectomy presents today for WEIGH IN visit. Patient's scale is broken. He is having three protein rich meals a day and will substitute with protein shake. Patient drinks mostly water, has 1-2 cups of cranberry juice a couple times a week and 1-2 cans ginger ale once a week. He has worked with Obesity Medicine for weight loss and is now ready to proceed with workup for bariatric surgery. He has appointments with pulmonologist, cardiologist, psychologist and nutritionist.

## 2021-01-08 NOTE — PHYSICAL EXAM
[Obese, well nourished, in no acute distress] : obese, well nourished, in no acute distress [Normal] : affect appropriate [de-identified] : EOMI, anicteric

## 2021-01-25 ENCOUNTER — APPOINTMENT (OUTPATIENT)
Dept: BARIATRICS/WEIGHT MGMT | Facility: CLINIC | Age: 41
End: 2021-01-25
Payer: COMMERCIAL

## 2021-01-25 PROCEDURE — 90791 PSYCH DIAGNOSTIC EVALUATION: CPT | Mod: 95

## 2021-01-27 ENCOUNTER — APPOINTMENT (OUTPATIENT)
Dept: BARIATRICS/WEIGHT MGMT | Facility: CLINIC | Age: 41
End: 2021-01-27
Payer: COMMERCIAL

## 2021-01-27 PROCEDURE — 97803 MED NUTRITION INDIV SUBSEQ: CPT | Mod: 95

## 2021-02-16 ENCOUNTER — APPOINTMENT (OUTPATIENT)
Dept: BARIATRICS/WEIGHT MGMT | Facility: CLINIC | Age: 41
End: 2021-02-16
Payer: COMMERCIAL

## 2021-02-16 VITALS
OXYGEN SATURATION: 98 % | HEART RATE: 100 BPM | HEIGHT: 76 IN | WEIGHT: 208 LBS | DIASTOLIC BLOOD PRESSURE: 80 MMHG | BODY MASS INDEX: 25.33 KG/M2 | SYSTOLIC BLOOD PRESSURE: 128 MMHG

## 2021-02-16 VITALS
BODY MASS INDEX: 38.36 KG/M2 | HEART RATE: 100 BPM | WEIGHT: 315 LBS | HEIGHT: 76 IN | DIASTOLIC BLOOD PRESSURE: 80 MMHG | OXYGEN SATURATION: 98 % | SYSTOLIC BLOOD PRESSURE: 138 MMHG

## 2021-02-16 PROCEDURE — 99213 OFFICE O/P EST LOW 20 MIN: CPT

## 2021-02-16 PROCEDURE — 99072 ADDL SUPL MATRL&STAF TM PHE: CPT

## 2021-02-18 ENCOUNTER — APPOINTMENT (OUTPATIENT)
Dept: BARIATRICS | Facility: CLINIC | Age: 41
End: 2021-02-18
Payer: COMMERCIAL

## 2021-02-18 ENCOUNTER — APPOINTMENT (OUTPATIENT)
Dept: FAMILY MEDICINE | Facility: CLINIC | Age: 41
End: 2021-02-18
Payer: COMMERCIAL

## 2021-02-18 VITALS
HEART RATE: 87 BPM | WEIGHT: 315 LBS | RESPIRATION RATE: 16 BRPM | HEIGHT: 76 IN | BODY MASS INDEX: 38.36 KG/M2 | OXYGEN SATURATION: 98 % | SYSTOLIC BLOOD PRESSURE: 130 MMHG | DIASTOLIC BLOOD PRESSURE: 80 MMHG | TEMPERATURE: 97.4 F

## 2021-02-18 VITALS — HEIGHT: 76 IN | BODY MASS INDEX: 38.36 KG/M2 | WEIGHT: 315 LBS

## 2021-02-18 DIAGNOSIS — E78.5 HYPERLIPIDEMIA, UNSPECIFIED: ICD-10-CM

## 2021-02-18 PROCEDURE — 99214 OFFICE O/P EST MOD 30 MIN: CPT | Mod: 25

## 2021-02-18 PROCEDURE — 99214 OFFICE O/P EST MOD 30 MIN: CPT | Mod: 95

## 2021-02-18 PROCEDURE — 99072 ADDL SUPL MATRL&STAF TM PHE: CPT

## 2021-02-18 NOTE — HISTORY OF PRESENT ILLNESS
[Home] : at home, [unfilled] , at the time of the visit. [Medical Office: ([facility])___] : at the medical office located in  [Verbal consent obtained from patient] : the patient, [unfilled] [de-identified] : 41 yo M with longstanding history of obesity undergoing workup for laparoscopic sleeve gastrectomy presents today for a follow up TELEMEDICINE WEIGH IN VISIT due to COVID-19 Pandemic. Current weight is 302 lbs. Patient's scale is broken. He is having three protein rich meals a day and will substitute with protein shake. Patient drinks mostly water and continues to work on decreasing liquid calories including  cranberry juice  and carbonated soda drinks.  He continues to work with  Obesity Medicine for weight loss and is continuing  workup for bariatric surgery.

## 2021-02-18 NOTE — ASSESSMENT
[FreeTextEntry1] : 39 yo M with longstanding history of obesity undergoing workup for laparoscopic sleeve gastrectomy presents today for a follow up TELEMEDICINE WEIGH IN VISIT due to COVID-19 Pandemic. Current weight is 302 lbs   Nutrition and exercise guidelines were reviewed with the patient - stop drinking soda and juice.  Encouraged to get COVID vaccine when available. All questions answered.\par \par Continue preoperative workup\par  Pt has appointments with pulmonologist, cardiologist, psychologist , Gastroenterologist and nutritionist scheduled next month - March 2021. \par Get NEW scale\par Follow up with obesity medicine\par Follow up in  4-6 weeks\par Call with any questions or concerns.\par

## 2021-02-19 LAB
25(OH)D3 SERPL-MCNC: 38.4 NG/ML
ALBUMIN SERPL ELPH-MCNC: 4.1 G/DL
ALP BLD-CCNC: 88 U/L
ALT SERPL-CCNC: 25 U/L
ANION GAP SERPL CALC-SCNC: 16 MMOL/L
APPEARANCE: CLEAR
AST SERPL-CCNC: 18 U/L
BASOPHILS # BLD AUTO: 0.06 K/UL
BASOPHILS NFR BLD AUTO: 0.6 %
BILIRUB SERPL-MCNC: 0.4 MG/DL
BILIRUBIN URINE: NEGATIVE
BLOOD URINE: NEGATIVE
BUN SERPL-MCNC: 12 MG/DL
C PEPTIDE SERPL-MCNC: 2.4 NG/ML
CALCIUM SERPL-MCNC: 9.3 MG/DL
CHLORIDE SERPL-SCNC: 104 MMOL/L
CHOLEST SERPL-MCNC: 145 MG/DL
CO2 SERPL-SCNC: 21 MMOL/L
COLOR: YELLOW
CREAT SERPL-MCNC: 1.19 MG/DL
EOSINOPHIL # BLD AUTO: 0.06 K/UL
EOSINOPHIL NFR BLD AUTO: 0.6 %
ESTIMATED AVERAGE GLUCOSE: 120 MG/DL
FERRITIN SERPL-MCNC: 264 NG/ML
FOLATE SERPL-MCNC: 6.3 NG/ML
GLUCOSE QUALITATIVE U: NEGATIVE
GLUCOSE SERPL-MCNC: 80 MG/DL
HBA1C MFR BLD HPLC: 5.8 %
HCT VFR BLD CALC: 43.8 %
HDLC SERPL-MCNC: 36 MG/DL
HGB BLD-MCNC: 13.8 G/DL
IMM GRANULOCYTES NFR BLD AUTO: 0.6 %
INR PPP: 1.08 RATIO
IRON SATN MFR SERPL: 24 %
IRON SERPL-MCNC: 60 UG/DL
KETONES URINE: NEGATIVE
LDLC SERPL CALC-MCNC: 94 MG/DL
LEUKOCYTE ESTERASE URINE: NEGATIVE
LYMPHOCYTES # BLD AUTO: 1.78 K/UL
LYMPHOCYTES NFR BLD AUTO: 17.4 %
MAN DIFF?: NORMAL
MCHC RBC-ENTMCNC: 25.5 PG
MCHC RBC-ENTMCNC: 31.5 GM/DL
MCV RBC AUTO: 80.8 FL
MONOCYTES # BLD AUTO: 0.7 K/UL
MONOCYTES NFR BLD AUTO: 6.8 %
NEUTROPHILS # BLD AUTO: 7.57 K/UL
NEUTROPHILS NFR BLD AUTO: 74 %
NITRITE URINE: NEGATIVE
NONHDLC SERPL-MCNC: 109 MG/DL
PH URINE: 6.5
PLATELET # BLD AUTO: 269 K/UL
POTASSIUM SERPL-SCNC: 4.1 MMOL/L
PROT SERPL-MCNC: 7.9 G/DL
PROTEIN URINE: NORMAL
PSA SERPL-MCNC: 0.15 NG/ML
PT BLD: 12.8 SEC
RBC # BLD: 5.42 M/UL
RBC # FLD: 14.6 %
SODIUM SERPL-SCNC: 140 MMOL/L
SPECIFIC GRAVITY URINE: 1.02
T4 SERPL-MCNC: 6.8 UG/DL
TIBC SERPL-MCNC: 251 UG/DL
TRIGL SERPL-MCNC: 72 MG/DL
TSH SERPL-ACNC: 1.74 UIU/ML
UIBC SERPL-MCNC: 190 UG/DL
UROBILINOGEN URINE: NORMAL
VIT B12 SERPL-MCNC: 600 PG/ML
WBC # FLD AUTO: 10.23 K/UL

## 2021-02-19 NOTE — ASSESSMENT
[FreeTextEntry1] : BARIATRIC SURGERY HISTORY: none\par OBESITY CO-MORBIDITIES: MADELYN, prediabetes\par ANTI-OBESITY MEDICATIONS: trulicity, metformin, topiramate, wellbutrin \par OBESITY MEDICATION SIDE EFFECTS: none \par \par \par Plan: \par  \par focus on whole food, lean protein\par  avoid red meat and processed meats\par continue 3 meals a day, dinner being smallest meal. shake for breakfast\par increase exercise\par continue to follow with surgery and RD\par continue  trulicity to 3mg qweekly, continue wellbutrin 300mg and topiramate 100mg, will keep medication as is until surgery date is set, then will begin to taper. \par continue to wear cpap nightly \par  goal BMI of 60 or less (goal weight 493 or less)\par \par f/u 3-4 weeks\par

## 2021-02-19 NOTE — HISTORY OF PRESENT ILLNESS
[FreeTextEntry1] : This is a 40 year old male  present in office today for followup visit. \par \par Patient's weight unchanged, maintaining a 93 pound weight loss. \par Patient is taking bupropion 300mg,  trulicity 3.0mg, topiramate 100mg and metformin by pcp. tolerating all medications well.\par He typically has 1 protein shake a day and 2 meals. 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 ie: chicken parm hero, but he was only able to eat half. \par Continues to eat turkey savage and red meat at least 3x/week\par \flavia sees [facility] team, in the process of finishing appts required for surgery. He was told maybe march or april depending on completion of requirements. \par \par sleeping with cpap most nights \par he is active in his home, going up and down the stairs, but not participating in a regular exercise routine. \par \par He is seeing PCP this week, will get blood work done.

## 2021-02-22 ENCOUNTER — NON-APPOINTMENT (OUTPATIENT)
Age: 41
End: 2021-02-22

## 2021-02-22 LAB
CREAT SPEC-SCNC: 192 MG/DL
MICROALBUMIN 24H UR DL<=1MG/L-MCNC: 2.2 MG/DL
MICROALBUMIN/CREAT 24H UR-RTO: 11 MG/G
URINE CULTURE <10: ABNORMAL

## 2021-02-25 LAB
FREE T4-ESOTERIX: 1.08 NG/DL
VIT B2 SERPL-MCNC: 307 UG/L
VIT B6 SERPL-MCNC: 6.8 UG/L

## 2021-02-26 LAB — VIT A SERPL-MCNC: 34.9 UG/DL

## 2021-03-04 ENCOUNTER — APPOINTMENT (OUTPATIENT)
Dept: PULMONOLOGY | Facility: CLINIC | Age: 41
End: 2021-03-04
Payer: COMMERCIAL

## 2021-03-04 ENCOUNTER — NON-APPOINTMENT (OUTPATIENT)
Age: 41
End: 2021-03-04

## 2021-03-04 VITALS — HEART RATE: 82 BPM | SYSTOLIC BLOOD PRESSURE: 120 MMHG | DIASTOLIC BLOOD PRESSURE: 66 MMHG | OXYGEN SATURATION: 97 %

## 2021-03-04 VITALS — BODY MASS INDEX: 24.95 KG/M2 | WEIGHT: 205 LBS

## 2021-03-04 VITALS — WEIGHT: 315 LBS | BODY MASS INDEX: 61.11 KG/M2

## 2021-03-04 PROCEDURE — 99204 OFFICE O/P NEW MOD 45 MIN: CPT

## 2021-03-04 PROCEDURE — 99072 ADDL SUPL MATRL&STAF TM PHE: CPT

## 2021-03-04 RX ORDER — CIPROFLOXACIN HYDROCHLORIDE 500 MG/1
500 TABLET, FILM COATED ORAL TWICE DAILY
Qty: 14 | Refills: 1 | Status: DISCONTINUED | COMMUNITY
Start: 2021-02-22 | End: 2021-03-04

## 2021-03-08 ENCOUNTER — APPOINTMENT (OUTPATIENT)
Dept: CARDIOLOGY | Facility: CLINIC | Age: 41
End: 2021-03-08
Payer: COMMERCIAL

## 2021-03-08 ENCOUNTER — NON-APPOINTMENT (OUTPATIENT)
Age: 41
End: 2021-03-08

## 2021-03-08 VITALS
WEIGHT: 315 LBS | DIASTOLIC BLOOD PRESSURE: 86 MMHG | SYSTOLIC BLOOD PRESSURE: 128 MMHG | HEIGHT: 76 IN | BODY MASS INDEX: 38.36 KG/M2 | RESPIRATION RATE: 16 BRPM | TEMPERATURE: 97.2 F | HEART RATE: 88 BPM

## 2021-03-08 DIAGNOSIS — E78.5 HYPERLIPIDEMIA, UNSPECIFIED: ICD-10-CM

## 2021-03-08 PROCEDURE — 93000 ELECTROCARDIOGRAM COMPLETE: CPT | Mod: NC

## 2021-03-08 PROCEDURE — 99072 ADDL SUPL MATRL&STAF TM PHE: CPT

## 2021-03-08 PROCEDURE — 99204 OFFICE O/P NEW MOD 45 MIN: CPT

## 2021-03-09 ENCOUNTER — APPOINTMENT (OUTPATIENT)
Dept: BARIATRICS/WEIGHT MGMT | Facility: CLINIC | Age: 41
End: 2021-03-09
Payer: COMMERCIAL

## 2021-03-09 VITALS
WEIGHT: 315 LBS | SYSTOLIC BLOOD PRESSURE: 130 MMHG | BODY MASS INDEX: 38.36 KG/M2 | HEIGHT: 76 IN | DIASTOLIC BLOOD PRESSURE: 90 MMHG | HEART RATE: 100 BPM | OXYGEN SATURATION: 99 %

## 2021-03-09 PROCEDURE — 99072 ADDL SUPL MATRL&STAF TM PHE: CPT

## 2021-03-09 PROCEDURE — 99214 OFFICE O/P EST MOD 30 MIN: CPT

## 2021-03-09 NOTE — ASSESSMENT
[FreeTextEntry1] : BARIATRIC SURGERY HISTORY: none\par OBESITY CO-MORBIDITIES: MADELYN, prediabetes\par ANTI-OBESITY MEDICATIONS: trulicity, metformin, topiramate, wellbutrin \par OBESITY MEDICATION SIDE EFFECTS: none \par \par \par Plan: \par  \par focus on whole food, lean protein\par  avoid red meat and processed meats\par continue 3 meals a day, dinner being smallest meal. shake for breakfast\par increase exercise-he plans to exercise more when the weather gets warmer \par continue to follow with surgery and RD\par continue trulicity 3mg qweekly, continue wellbutrin 300mg and topiramate 100mg, will keep medication as is until surgery date is set, then will begin to taper. \par  wear cpap nightly \par \par \par f/u 4-6 weeks\par

## 2021-03-09 NOTE — HISTORY OF PRESENT ILLNESS
[FreeTextEntry1] : This is a 40 year old male  present in office today for followup visit. \par \par Patient is down 11 pounds since last visit. continues Bupropion 300mg, metformin, topiramate 100mg, and trulicity 3mg \par He eats mostly 2 meals, sometimes adds a protein shake. He seems to be eating less, feels full quickly and will stop eating. \par Yesterday he had a banana and unsalted pretzel for breakfast, turkey wrap for lunch, salad for dinner. \par Patient states he moves around a lot during the day, but does not monitor step count\par He wears CPAP when he remembers-not nightly \par \par He has been following through with all necessary appts to get clearance for surgery. \par \par patient was recommended by dr amin to get covid vaccine

## 2021-03-09 NOTE — REASON FOR VISIT
[Follow-Up Visit] : a follow-up visit for [Metabolic Syndrome] : metabolic syndrome [Obesity] : obesity [Obstructive Sleep Apena] : obstructive sleep apena

## 2021-03-10 ENCOUNTER — APPOINTMENT (OUTPATIENT)
Dept: BARIATRICS/WEIGHT MGMT | Facility: CLINIC | Age: 41
End: 2021-03-10
Payer: COMMERCIAL

## 2021-03-10 VITALS — WEIGHT: 315 LBS | BODY MASS INDEX: 38.36 KG/M2 | HEIGHT: 76 IN

## 2021-03-10 PROCEDURE — 97803 MED NUTRITION INDIV SUBSEQ: CPT | Mod: 95

## 2021-03-13 ENCOUNTER — APPOINTMENT (OUTPATIENT)
Dept: DISASTER EMERGENCY | Facility: CLINIC | Age: 41
End: 2021-03-13

## 2021-03-13 ENCOUNTER — LABORATORY RESULT (OUTPATIENT)
Age: 41
End: 2021-03-13

## 2021-03-16 ENCOUNTER — APPOINTMENT (OUTPATIENT)
Dept: PULMONOLOGY | Facility: CLINIC | Age: 41
End: 2021-03-16
Payer: COMMERCIAL

## 2021-03-16 VITALS — OXYGEN SATURATION: 98 % | HEART RATE: 88 BPM | RESPIRATION RATE: 16 BRPM

## 2021-03-16 VITALS — WEIGHT: 315 LBS | BODY MASS INDEX: 59.77 KG/M2 | TEMPERATURE: 98.3 F

## 2021-03-16 PROCEDURE — 99072 ADDL SUPL MATRL&STAF TM PHE: CPT

## 2021-03-16 PROCEDURE — 99214 OFFICE O/P EST MOD 30 MIN: CPT | Mod: 25

## 2021-03-17 ENCOUNTER — APPOINTMENT (OUTPATIENT)
Dept: CARDIOLOGY | Facility: CLINIC | Age: 41
End: 2021-03-17
Payer: COMMERCIAL

## 2021-03-17 PROCEDURE — 99072 ADDL SUPL MATRL&STAF TM PHE: CPT

## 2021-03-17 PROCEDURE — 93306 TTE W/DOPPLER COMPLETE: CPT

## 2021-03-17 RX ORDER — PERFLUTREN 6.52 MG/ML
6.52 INJECTION, SUSPENSION INTRAVENOUS
Qty: 2 | Refills: 0 | Status: COMPLETED | OUTPATIENT
Start: 2021-03-17

## 2021-03-17 RX ADMIN — PERFLUTREN MG/ML: 6.52 INJECTION, SUSPENSION INTRAVENOUS at 00:00

## 2021-03-22 LAB
APPEARANCE: CLEAR
BILIRUBIN URINE: NEGATIVE
BLOOD URINE: NEGATIVE
COLOR: NORMAL
GLUCOSE QUALITATIVE U: NEGATIVE
KETONES URINE: NEGATIVE
LEUKOCYTE ESTERASE URINE: NEGATIVE
NITRITE URINE: NEGATIVE
PH URINE: 6
PROTEIN URINE: NORMAL
SPECIFIC GRAVITY URINE: 1.02
UROBILINOGEN URINE: NORMAL

## 2021-04-05 ENCOUNTER — RESULT REVIEW (OUTPATIENT)
Age: 41
End: 2021-04-05

## 2021-04-15 ENCOUNTER — APPOINTMENT (OUTPATIENT)
Dept: BARIATRICS/WEIGHT MGMT | Facility: CLINIC | Age: 41
End: 2021-04-15

## 2021-04-19 ENCOUNTER — APPOINTMENT (OUTPATIENT)
Dept: BARIATRICS/WEIGHT MGMT | Facility: CLINIC | Age: 41
End: 2021-04-19
Payer: COMMERCIAL

## 2021-04-19 VITALS
HEART RATE: 100 BPM | HEIGHT: 76 IN | OXYGEN SATURATION: 97 % | BODY MASS INDEX: 38.36 KG/M2 | SYSTOLIC BLOOD PRESSURE: 132 MMHG | DIASTOLIC BLOOD PRESSURE: 90 MMHG | TEMPERATURE: 97.1 F | WEIGHT: 315 LBS

## 2021-04-19 PROCEDURE — 99072 ADDL SUPL MATRL&STAF TM PHE: CPT

## 2021-04-19 PROCEDURE — 99214 OFFICE O/P EST MOD 30 MIN: CPT

## 2021-04-19 NOTE — HISTORY OF PRESENT ILLNESS
[FreeTextEntry1] : This is a 40 year old male  present in office today for followup visit. \par \par Patient's weight is up 3 pounds but according to his home sale, he is down 3 pounds in the last 6 weeks. Weighed 491 at home yesterday. \par He completed all of his clearance appointments for bariatric surgery\par He follows up with Dr Savage next week. \par Patient has been seeing RD as well. \par He tries to get  ounces of water a day \par Continues taking medication regiment as ordered\par Patient states he is active every day, "walks up and down stairs 30x/day" and has been [place] 1 mile [place] the [place] 2x[place]week with his gf\par \par He has a trip to [facility] planned for July 9-16

## 2021-04-19 NOTE — ASSESSMENT
[FreeTextEntry1] : BARIATRIC SURGERY HISTORY: none\par OBESITY CO-MORBIDITIES: MADELYN, prediabetes\par ANTI-OBESITY MEDICATIONS: trulicity, metformin, topiramate, wellbutrin \par OBESITY MEDICATION SIDE EFFECTS: none \par \par \par Plan: \par  \par focus on whole food, lean protein\par continue to follow with surgery and RD\par increase exercise\par encouraged to get his fitbit fixed to get daily step count, should be 10,000 steps daily \par continue trulicity 3mg qweekly,  wellbutrin 300mg daily and topiramate 100mg qhs, will keep medication as is until surgery date is set, then will begin to taper. \par  wear cpap nightly \par \par \par f/u 4-6 weeks\par

## 2021-04-21 ENCOUNTER — APPOINTMENT (OUTPATIENT)
Dept: BARIATRICS/WEIGHT MGMT | Facility: CLINIC | Age: 41
End: 2021-04-21
Payer: COMMERCIAL

## 2021-04-21 PROCEDURE — 97803 MED NUTRITION INDIV SUBSEQ: CPT | Mod: 95

## 2021-04-27 ENCOUNTER — RX RENEWAL (OUTPATIENT)
Age: 41
End: 2021-04-27

## 2021-04-27 ENCOUNTER — NON-APPOINTMENT (OUTPATIENT)
Age: 41
End: 2021-04-27

## 2021-04-29 ENCOUNTER — APPOINTMENT (OUTPATIENT)
Dept: BARIATRICS | Facility: CLINIC | Age: 41
End: 2021-04-29
Payer: COMMERCIAL

## 2021-04-29 VITALS
BODY MASS INDEX: 38.36 KG/M2 | HEIGHT: 76 IN | SYSTOLIC BLOOD PRESSURE: 122 MMHG | WEIGHT: 315 LBS | HEART RATE: 96 BPM | OXYGEN SATURATION: 98 % | DIASTOLIC BLOOD PRESSURE: 80 MMHG | TEMPERATURE: 97.2 F

## 2021-04-29 DIAGNOSIS — E66.01 MORBID (SEVERE) OBESITY DUE TO EXCESS CALORIES: ICD-10-CM

## 2021-04-29 PROCEDURE — 99072 ADDL SUPL MATRL&STAF TM PHE: CPT

## 2021-04-29 PROCEDURE — 99214 OFFICE O/P EST MOD 30 MIN: CPT

## 2021-04-29 NOTE — ASSESSMENT
[FreeTextEntry1] : 41 yo M with longstanding history of obesity completed workup for laparoscopic sleeve gastrectomy and is ready to be scheduled for laparoscopic sleeve gastrectomy. Encouraged to lose weight prior to surgery. Nutrition and exercise guidelines were reviewed with the patient - three meals a day and no liquid calories. Procedure risks and benefits were again discussed with patient. All questions were answered.\par \par Schedule surgery date - will need cardiac stress test prior to sx\par Three week preoperative modified liquid diet\par PST (Vitamin B1) and Medical clearance\par Preoperative education class\par Follow up same day as PST\par Call if any questions or concerns\par \par Extensive Time was spent reviewing chart before and after visit.

## 2021-04-29 NOTE — REVIEW OF SYSTEMS
[Recent Change In Weight] : ~T recent weight change [Negative] : Endocrine [Dysphagia] : no dysphagia [Chest Pain] : no chest pain [Palpitations] : no palpitations [Shortness Of Breath] : no shortness of breath [Wheezing] : no wheezing [Abdominal Pain] : no abdominal pain [Vomiting] : no vomiting [Constipation] : no constipation [Diarrhea] : no diarrhea [Reflux/Heartburn] : no reflex/heartburn [FreeTextEntry2] : weight loss

## 2021-04-29 NOTE — PHYSICAL EXAM
[Obese, well nourished, in no acute distress] : obese, well nourished, in no acute distress [Normal] : affect appropriate [de-identified] : EOMI, anicteric  [de-identified] : morbidly obese, soft, nontender, no evidence of hernia.

## 2021-04-29 NOTE — HISTORY OF PRESENT ILLNESS
[de-identified] : 39 yo M with longstanding history of morbid obesity undergoing workup for laparoscopic sleeve gastrectomy presents today for preoperative visit. Lost weight since last visit. Continues to work with Obesity Medicine to assist with preop weight loss. Making efforts to have three protein rich meals a day. Sometimes has two meals a day (misses either breakfast or lunch). Rarely snacks. No longer has cereal at night. Drinks mostly water and three cans a week of ginger ale. Walking frequently for exercise. Has severe MADELYN and uses CPAP nightly. No history of motion sickness and postoperative nausea and urinary retention. Scheduled to have second dose of COVID vaccine on 5/11.

## 2021-05-05 ENCOUNTER — TRANSCRIPTION ENCOUNTER (OUTPATIENT)
Age: 41
End: 2021-05-05

## 2021-05-10 ENCOUNTER — APPOINTMENT (OUTPATIENT)
Dept: GASTROENTEROLOGY | Facility: CLINIC | Age: 41
End: 2021-05-10

## 2021-05-11 ENCOUNTER — OUTPATIENT (OUTPATIENT)
Dept: OUTPATIENT SERVICES | Facility: HOSPITAL | Age: 41
LOS: 1 days | End: 2021-05-11
Payer: COMMERCIAL

## 2021-05-11 ENCOUNTER — APPOINTMENT (OUTPATIENT)
Dept: BARIATRICS | Facility: CLINIC | Age: 41
End: 2021-05-11
Payer: COMMERCIAL

## 2021-05-11 VITALS
HEIGHT: 76 IN | HEART RATE: 95 BPM | OXYGEN SATURATION: 97 % | RESPIRATION RATE: 16 BRPM | WEIGHT: 315 LBS | DIASTOLIC BLOOD PRESSURE: 88 MMHG | SYSTOLIC BLOOD PRESSURE: 128 MMHG | TEMPERATURE: 97 F

## 2021-05-11 VITALS
OXYGEN SATURATION: 98 % | DIASTOLIC BLOOD PRESSURE: 90 MMHG | BODY MASS INDEX: 38.36 KG/M2 | TEMPERATURE: 97.2 F | WEIGHT: 315 LBS | HEART RATE: 92 BPM | SYSTOLIC BLOOD PRESSURE: 130 MMHG | HEIGHT: 76 IN

## 2021-05-11 DIAGNOSIS — E66.01 MORBID (SEVERE) OBESITY DUE TO EXCESS CALORIES: ICD-10-CM

## 2021-05-11 DIAGNOSIS — Z01.818 ENCOUNTER FOR OTHER PREPROCEDURAL EXAMINATION: ICD-10-CM

## 2021-05-11 DIAGNOSIS — G47.33 OBSTRUCTIVE SLEEP APNEA (ADULT) (PEDIATRIC): ICD-10-CM

## 2021-05-11 LAB
ALBUMIN SERPL ELPH-MCNC: 3.7 G/DL — SIGNIFICANT CHANGE UP (ref 3.3–5)
ALP SERPL-CCNC: 66 U/L — SIGNIFICANT CHANGE UP (ref 30–120)
ALT FLD-CCNC: 42 U/L DA — SIGNIFICANT CHANGE UP (ref 10–60)
ANION GAP SERPL CALC-SCNC: 8 MMOL/L — SIGNIFICANT CHANGE UP (ref 5–17)
APTT BLD: 42.8 SEC — HIGH (ref 27.5–35.5)
AST SERPL-CCNC: 24 U/L — SIGNIFICANT CHANGE UP (ref 10–40)
BILIRUB SERPL-MCNC: 0.3 MG/DL — SIGNIFICANT CHANGE UP (ref 0.2–1.2)
BLD GP AB SCN SERPL QL: SIGNIFICANT CHANGE UP
BUN SERPL-MCNC: 18 MG/DL — SIGNIFICANT CHANGE UP (ref 7–23)
CALCIUM SERPL-MCNC: 9.4 MG/DL — SIGNIFICANT CHANGE UP (ref 8.4–10.5)
CHLORIDE SERPL-SCNC: 100 MMOL/L — SIGNIFICANT CHANGE UP (ref 96–108)
CO2 SERPL-SCNC: 28 MMOL/L — SIGNIFICANT CHANGE UP (ref 22–31)
CREAT SERPL-MCNC: 1.15 MG/DL — SIGNIFICANT CHANGE UP (ref 0.5–1.3)
GLUCOSE SERPL-MCNC: 86 MG/DL — SIGNIFICANT CHANGE UP (ref 70–99)
HCT VFR BLD CALC: 44.5 % — SIGNIFICANT CHANGE UP (ref 39–50)
HGB BLD-MCNC: 14.1 G/DL — SIGNIFICANT CHANGE UP (ref 13–17)
INR BLD: 1.13 RATIO — SIGNIFICANT CHANGE UP (ref 0.88–1.16)
MCHC RBC-ENTMCNC: 25 PG — LOW (ref 27–34)
MCHC RBC-ENTMCNC: 31.7 GM/DL — LOW (ref 32–36)
MCV RBC AUTO: 78.8 FL — LOW (ref 80–100)
NRBC # BLD: 0 /100 WBCS — SIGNIFICANT CHANGE UP (ref 0–0)
PLATELET # BLD AUTO: 276 K/UL — SIGNIFICANT CHANGE UP (ref 150–400)
POTASSIUM SERPL-MCNC: 3.8 MMOL/L — SIGNIFICANT CHANGE UP (ref 3.5–5.3)
POTASSIUM SERPL-SCNC: 3.8 MMOL/L — SIGNIFICANT CHANGE UP (ref 3.5–5.3)
PROT SERPL-MCNC: 8.9 G/DL — HIGH (ref 6–8.3)
PROTHROM AB SERPL-ACNC: 13.6 SEC — SIGNIFICANT CHANGE UP (ref 10.6–13.6)
RBC # BLD: 5.65 M/UL — SIGNIFICANT CHANGE UP (ref 4.2–5.8)
RBC # FLD: 14.6 % — HIGH (ref 10.3–14.5)
SODIUM SERPL-SCNC: 136 MMOL/L — SIGNIFICANT CHANGE UP (ref 135–145)
WBC # BLD: 10.99 K/UL — HIGH (ref 3.8–10.5)
WBC # FLD AUTO: 10.99 K/UL — HIGH (ref 3.8–10.5)

## 2021-05-11 PROCEDURE — 83036 HEMOGLOBIN GLYCOSYLATED A1C: CPT

## 2021-05-11 PROCEDURE — 85027 COMPLETE CBC AUTOMATED: CPT

## 2021-05-11 PROCEDURE — 80053 COMPREHEN METABOLIC PANEL: CPT

## 2021-05-11 PROCEDURE — 86900 BLOOD TYPING SEROLOGIC ABO: CPT

## 2021-05-11 PROCEDURE — 36415 COLL VENOUS BLD VENIPUNCTURE: CPT

## 2021-05-11 PROCEDURE — 86901 BLOOD TYPING SEROLOGIC RH(D): CPT

## 2021-05-11 PROCEDURE — G0463: CPT

## 2021-05-11 PROCEDURE — 85610 PROTHROMBIN TIME: CPT

## 2021-05-11 PROCEDURE — 99072 ADDL SUPL MATRL&STAF TM PHE: CPT

## 2021-05-11 PROCEDURE — 85730 THROMBOPLASTIN TIME PARTIAL: CPT

## 2021-05-11 PROCEDURE — 86850 RBC ANTIBODY SCREEN: CPT

## 2021-05-11 PROCEDURE — 99213 OFFICE O/P EST LOW 20 MIN: CPT

## 2021-05-11 NOTE — H&P PST ADULT - NSICDXPROBLEM_GEN_ALL_CORE_FT
PROBLEM DIAGNOSES  Problem: Class 3 severe obesity with body mass index (BMI) of 50.0 to 59.9 in adult  Assessment and Plan: Laparoscopic cholecystectomy is planned for 5/24/2021.  Diagnostic testing performed.  Medical clearance and stress test requested.  COVID PCR testing is planned for 5/21/2021 at [facility].  pre op instructions were reviewed; best wishes offered.

## 2021-05-11 NOTE — H&P PST ADULT - NSICDXPASTMEDICALHX_GEN_ALL_CORE_FT
PAST MEDICAL HISTORY:  Class 3 severe obesity with body mass index (BMI) of 50.0 to 59.9 in adult     COVID-19 vaccine series completed 5/11/2021    Seasonal allergies     Severe obesity (BMI >= 40) BMI 59    Sleep apnea CPAP    Type 2 diabetes mellitus

## 2021-05-12 LAB
A1C WITH ESTIMATED AVERAGE GLUCOSE RESULT: 5.6 % — SIGNIFICANT CHANGE UP (ref 4–5.6)
ESTIMATED AVERAGE GLUCOSE: 114 MG/DL — SIGNIFICANT CHANGE UP (ref 68–114)

## 2021-05-13 NOTE — REVIEW OF SYSTEMS
[Recent Change In Weight] : ~T recent weight change [Negative] : Endocrine [Dysphagia] : no dysphagia [Chest Pain] : no chest pain [Palpitations] : no palpitations [Shortness Of Breath] : no shortness of breath [Wheezing] : no wheezing [Abdominal Pain] : no abdominal pain [Vomiting] : no vomiting [Constipation] : no constipation [Diarrhea] : no diarrhea [Reflux/Heartburn] : no reflex/heartburn [FreeTextEntry2] : weight gain

## 2021-05-13 NOTE — PHYSICAL EXAM
[Obese, well nourished, in no acute distress] : obese, well nourished, in no acute distress [Normal] : affect appropriate [de-identified] : EOMI, anicteric  [de-identified] : morbidly obese, soft, nontender, no evidence of hernia.

## 2021-05-13 NOTE — ASSESSMENT
[FreeTextEntry1] : 39 yo M with longstanding history of obesity scheduled for laparoscopic sleeve gastrectomy on 5/24. Encouraged to lose weight prior to surgery. Nutrition and exercise guidelines were reviewed with the patient. Procedure risks and benefits were again discussed with patient. All questions were answered.\par \par Surgery 5/24\par Continue preoperative modified liquid diet\par PST (Vitamin B1) and Medical clearance\par Cardiac stress test 5/17\par Preoperative education class\par Follow up next week for weight check\par Call if any questions or concerns

## 2021-05-13 NOTE — HISTORY OF PRESENT ILLNESS
[de-identified] : 39 yo M with longstanding history of morbid obesity scheduled for laparoscopic sleeve gastrectomy on 5/24. Gained weight since last visit. Started modified liquid diet last week and is following guidelines. Reports less frequent bowel movements - from four to twice a day. Not straining with BM. Walking frequently for exercise, but might be less in the past week. Has severe MADELYN and uses CPAP nightly. No history of motion sickness. Had second dose of COVID vaccine on 5/11. Has PST this afternoon.

## 2021-05-17 ENCOUNTER — APPOINTMENT (OUTPATIENT)
Dept: CARDIOLOGY | Facility: CLINIC | Age: 41
End: 2021-05-17
Payer: COMMERCIAL

## 2021-05-17 ENCOUNTER — MED ADMIN CHARGE (OUTPATIENT)
Age: 41
End: 2021-05-17

## 2021-05-17 PROCEDURE — 93351 STRESS TTE COMPLETE: CPT

## 2021-05-17 RX ORDER — ATROPINE SULFATE 0.1 MG/ML
1 INJECTION INTRAVENOUS
Refills: 0 | Status: COMPLETED | OUTPATIENT
Start: 2021-05-17

## 2021-05-17 RX ORDER — ATROPINE SULFATE 0.1 MG/ML
0.5 INJECTION, SOLUTION INTRAVENOUS
Refills: 0 | Status: COMPLETED | OUTPATIENT
Start: 2021-05-17

## 2021-05-17 RX ORDER — DOBUTAMINE 12.5 MG/ML
250 INJECTION, SOLUTION, CONCENTRATE INTRAVENOUS
Qty: 1 | Refills: 0 | Status: COMPLETED | OUTPATIENT
Start: 2021-05-17

## 2021-05-17 RX ADMIN — DOBUTAMINE 0 MG/20ML: 12.5 INJECTION, SOLUTION, CONCENTRATE INTRAVENOUS at 00:00

## 2021-05-17 RX ADMIN — ATROPINE SULFATE 0 MG/5ML: 0.1 INJECTION, SOLUTION INTRAVENOUS at 00:00

## 2021-05-17 RX ADMIN — ATROPINE SULFATE 0 MG/10ML: 0.1 INJECTION, SOLUTION INTRAVENOUS at 00:00

## 2021-05-18 ENCOUNTER — APPOINTMENT (OUTPATIENT)
Dept: FAMILY MEDICINE | Facility: CLINIC | Age: 41
End: 2021-05-18
Payer: COMMERCIAL

## 2021-05-18 VITALS
TEMPERATURE: 97.9 F | BODY MASS INDEX: 38.36 KG/M2 | HEART RATE: 87 BPM | SYSTOLIC BLOOD PRESSURE: 126 MMHG | WEIGHT: 315 LBS | OXYGEN SATURATION: 98 % | RESPIRATION RATE: 12 BRPM | DIASTOLIC BLOOD PRESSURE: 78 MMHG | HEIGHT: 76 IN

## 2021-05-18 PROCEDURE — 99214 OFFICE O/P EST MOD 30 MIN: CPT

## 2021-05-18 RX ORDER — PERFLUTREN 6.52 MG/ML
6.52 INJECTION, SUSPENSION INTRAVENOUS
Qty: 10 | Refills: 0 | Status: COMPLETED | OUTPATIENT
Start: 2021-05-18

## 2021-05-18 RX ADMIN — PERFLUTREN MG/ML: 6.52 INJECTION, SUSPENSION INTRAVENOUS at 00:00

## 2021-05-19 ENCOUNTER — APPOINTMENT (OUTPATIENT)
Dept: BARIATRICS | Facility: CLINIC | Age: 41
End: 2021-05-19
Payer: COMMERCIAL

## 2021-05-19 VITALS
SYSTOLIC BLOOD PRESSURE: 122 MMHG | HEART RATE: 94 BPM | DIASTOLIC BLOOD PRESSURE: 80 MMHG | HEIGHT: 76 IN | TEMPERATURE: 96.6 F | BODY MASS INDEX: 38.36 KG/M2 | OXYGEN SATURATION: 98 % | WEIGHT: 315 LBS

## 2021-05-19 PROCEDURE — 99213 OFFICE O/P EST LOW 20 MIN: CPT

## 2021-05-19 RX ORDER — SODIUM CHLORIDE 9 MG/ML
1000 INJECTION, SOLUTION INTRAVENOUS
Refills: 0 | Status: DISCONTINUED | OUTPATIENT
Start: 2021-05-24 | End: 2021-05-27

## 2021-05-19 RX ORDER — HYOSCYAMINE SULFATE 0.13 MG
0.12 TABLET ORAL EVERY 6 HOURS
Refills: 0 | Status: DISCONTINUED | OUTPATIENT
Start: 2021-05-24 | End: 2021-05-27

## 2021-05-19 RX ORDER — INSULIN LISPRO 100/ML
VIAL (ML) SUBCUTANEOUS
Refills: 0 | Status: DISCONTINUED | OUTPATIENT
Start: 2021-05-24 | End: 2021-05-27

## 2021-05-19 RX ORDER — ONDANSETRON 8 MG/1
4 TABLET, FILM COATED ORAL EVERY 6 HOURS
Refills: 0 | Status: DISCONTINUED | OUTPATIENT
Start: 2021-05-24 | End: 2021-05-27

## 2021-05-19 RX ORDER — HYDROMORPHONE HYDROCHLORIDE 2 MG/ML
0.5 INJECTION INTRAMUSCULAR; INTRAVENOUS; SUBCUTANEOUS EVERY 4 HOURS
Refills: 0 | Status: DISCONTINUED | OUTPATIENT
Start: 2021-05-24 | End: 2021-05-27

## 2021-05-19 RX ORDER — DEXTROSE 50 % IN WATER 50 %
25 SYRINGE (ML) INTRAVENOUS ONCE
Refills: 0 | Status: DISCONTINUED | OUTPATIENT
Start: 2021-05-24 | End: 2021-05-27

## 2021-05-19 RX ORDER — GLUCAGON INJECTION, SOLUTION 0.5 MG/.1ML
1 INJECTION, SOLUTION SUBCUTANEOUS ONCE
Refills: 0 | Status: DISCONTINUED | OUTPATIENT
Start: 2021-05-24 | End: 2021-05-27

## 2021-05-19 RX ORDER — ENOXAPARIN SODIUM 100 MG/ML
40 INJECTION SUBCUTANEOUS EVERY 12 HOURS
Refills: 0 | Status: DISCONTINUED | OUTPATIENT
Start: 2021-05-24 | End: 2021-05-27

## 2021-05-19 RX ORDER — DEXTROSE 50 % IN WATER 50 %
15 SYRINGE (ML) INTRAVENOUS ONCE
Refills: 0 | Status: DISCONTINUED | OUTPATIENT
Start: 2021-05-24 | End: 2021-05-27

## 2021-05-19 RX ORDER — PANTOPRAZOLE SODIUM 20 MG/1
40 TABLET, DELAYED RELEASE ORAL DAILY
Refills: 0 | Status: DISCONTINUED | OUTPATIENT
Start: 2021-05-24 | End: 2021-05-27

## 2021-05-19 RX ORDER — DEXTROSE 50 % IN WATER 50 %
12.5 SYRINGE (ML) INTRAVENOUS ONCE
Refills: 0 | Status: DISCONTINUED | OUTPATIENT
Start: 2021-05-24 | End: 2021-05-27

## 2021-05-21 ENCOUNTER — OUTPATIENT (OUTPATIENT)
Dept: OUTPATIENT SERVICES | Facility: HOSPITAL | Age: 41
LOS: 1 days | End: 2021-05-21
Payer: COMMERCIAL

## 2021-05-21 DIAGNOSIS — Z20.828 CONTACT WITH AND (SUSPECTED) EXPOSURE TO OTHER VIRAL COMMUNICABLE DISEASES: ICD-10-CM

## 2021-05-21 LAB — SARS-COV-2 RNA SPEC QL NAA+PROBE: SIGNIFICANT CHANGE UP

## 2021-05-21 PROCEDURE — U0005: CPT

## 2021-05-21 PROCEDURE — U0003: CPT

## 2021-05-21 NOTE — HISTORY OF PRESENT ILLNESS
[FreeTextEntry1] : Patient is a 39yo M with morbid obesity, HLD, borderline diabetes here for pre-operative evaluation prior to bariatric surgery. Walks regularly and goes up and down stairs without problem. Denies CP/SOB. Patient denies PND/orthopnea/edema/palpitations/syncope/claudication. \par \par Drives limos, currently unemployed and lives with family friends. \par \par ROS: GI negative, all others negative

## 2021-05-21 NOTE — PHYSICAL EXAM
[General Appearance - Well Developed] : well developed [General Appearance - Well Nourished] : well nourished [Normal Conjunctiva] : the conjunctiva exhibited no abnormalities [Normal Oropharynx] : normal oropharynx [Normal Jugular Venous V Waves Present] : normal jugular venous V waves present [Heart Rate And Rhythm] : heart rate and rhythm were normal [Heart Sounds] : normal S1 and S2 [Murmurs] : no murmurs present [Abdomen Soft] : soft [Nail Clubbing] : no clubbing of the fingernails [Skin Color & Pigmentation] : normal skin color and pigmentation [Oriented To Time, Place, And Person] : oriented to person, place, and time [Affect] : the affect was normal [Auscultation Breath Sounds / Voice Sounds] : lungs were clear to auscultation bilaterally [Abnormal Walk] : normal gait [Cyanosis, Localized] : no localized cyanosis [FreeTextEntry1] : no edema

## 2021-05-21 NOTE — ASSESSMENT
[FreeTextEntry1] : ECG: SR, no significant ST-T abnormalities and normal intervals , probable LAE \par \par \par A1C =5.8 (2/2021)\par  HDL 36 LDL 94 TG 72 (2/2021)

## 2021-05-21 NOTE — DISCUSSION/SUMMARY
[FreeTextEntry1] : Patient is a 39yo M with morbid obesity, HLD, borderline diabetes here for pre-operative evaluation prior to bariatric surgery. Limited somewhat by weight but no symptoms at this time. Given weight, need for surgery, MADELYN and mild LAE on ECG will arrange echo prior to surgery. Otherwise at low CV risk. \par \par 1. Echo prior to surgery, call with results\par 2. As long as unremarkable, low risk for bariatric surgery from CV standpoint\par 3. Pulm follow up, has PFTS pending. Encouraged CPAP\par 4. Recommend aggressive diet and lifestyle modifications \par 5. Follow up 1 year

## 2021-05-23 ENCOUNTER — TRANSCRIPTION ENCOUNTER (OUTPATIENT)
Age: 41
End: 2021-05-23

## 2021-05-24 ENCOUNTER — INPATIENT (INPATIENT)
Facility: HOSPITAL | Age: 41
LOS: 2 days | Discharge: HOME | DRG: 621 | End: 2021-05-27
Attending: SURGERY | Admitting: SURGERY
Payer: COMMERCIAL

## 2021-05-24 ENCOUNTER — APPOINTMENT (OUTPATIENT)
Dept: BARIATRICS | Facility: HOSPITAL | Age: 41
End: 2021-05-24
Payer: COMMERCIAL

## 2021-05-24 ENCOUNTER — RESULT REVIEW (OUTPATIENT)
Age: 41
End: 2021-05-24

## 2021-05-24 VITALS
TEMPERATURE: 98 F | HEART RATE: 76 BPM | OXYGEN SATURATION: 100 % | DIASTOLIC BLOOD PRESSURE: 77 MMHG | HEIGHT: 76 IN | WEIGHT: 315 LBS | RESPIRATION RATE: 16 BRPM | SYSTOLIC BLOOD PRESSURE: 143 MMHG

## 2021-05-24 DIAGNOSIS — E66.01 MORBID (SEVERE) OBESITY DUE TO EXCESS CALORIES: ICD-10-CM

## 2021-05-24 DIAGNOSIS — G47.30 SLEEP APNEA, UNSPECIFIED: ICD-10-CM

## 2021-05-24 DIAGNOSIS — E11.9 TYPE 2 DIABETES MELLITUS WITHOUT COMPLICATIONS: ICD-10-CM

## 2021-05-24 LAB
ABO RH CONFIRMATION: SIGNIFICANT CHANGE UP
GLUCOSE BLDC GLUCOMTR-MCNC: 104 MG/DL — HIGH (ref 70–99)
GLUCOSE BLDC GLUCOMTR-MCNC: 84 MG/DL — SIGNIFICANT CHANGE UP (ref 70–99)

## 2021-05-24 PROCEDURE — 43775 LAP SLEEVE GASTRECTOMY: CPT | Mod: 22

## 2021-05-24 PROCEDURE — 88307 TISSUE EXAM BY PATHOLOGIST: CPT | Mod: 26

## 2021-05-24 PROCEDURE — 43775 LAP SLEEVE GASTRECTOMY: CPT | Mod: AS,22

## 2021-05-24 PROCEDURE — 99222 1ST HOSP IP/OBS MODERATE 55: CPT

## 2021-05-24 RX ORDER — CHLORHEXIDINE GLUCONATE 213 G/1000ML
1 SOLUTION TOPICAL ONCE
Refills: 0 | Status: COMPLETED | OUTPATIENT
Start: 2021-05-24 | End: 2021-05-24

## 2021-05-24 RX ORDER — ACETAMINOPHEN 500 MG
1000 TABLET ORAL ONCE
Refills: 0 | Status: DISCONTINUED | OUTPATIENT
Start: 2021-05-24 | End: 2021-05-24

## 2021-05-24 RX ORDER — APREPITANT 80 MG/1
40 CAPSULE ORAL ONCE
Refills: 0 | Status: COMPLETED | OUTPATIENT
Start: 2021-05-24 | End: 2021-05-24

## 2021-05-24 RX ORDER — CEFAZOLIN SODIUM 1 G
3000 VIAL (EA) INJECTION ONCE
Refills: 0 | Status: COMPLETED | OUTPATIENT
Start: 2021-05-24 | End: 2021-05-24

## 2021-05-24 RX ORDER — ACETAMINOPHEN 500 MG
1000 TABLET ORAL ONCE
Refills: 0 | Status: COMPLETED | OUTPATIENT
Start: 2021-05-24 | End: 2021-05-24

## 2021-05-24 RX ORDER — ACETAMINOPHEN 500 MG
1000 TABLET ORAL EVERY 6 HOURS
Refills: 0 | Status: COMPLETED | OUTPATIENT
Start: 2021-05-24 | End: 2021-05-25

## 2021-05-24 RX ORDER — CETIRIZINE HYDROCHLORIDE 10 MG/1
1 TABLET ORAL
Qty: 0 | Refills: 0 | DISCHARGE

## 2021-05-24 RX ORDER — IBUPROFEN 200 MG
800 TABLET ORAL EVERY 6 HOURS
Refills: 0 | Status: DISCONTINUED | OUTPATIENT
Start: 2021-05-24 | End: 2021-05-27

## 2021-05-24 RX ORDER — ONDANSETRON 8 MG/1
4 TABLET, FILM COATED ORAL ONCE
Refills: 0 | Status: DISCONTINUED | OUTPATIENT
Start: 2021-05-24 | End: 2021-05-24

## 2021-05-24 RX ORDER — SODIUM CHLORIDE 9 MG/ML
1000 INJECTION, SOLUTION INTRAVENOUS
Refills: 0 | Status: DISCONTINUED | OUTPATIENT
Start: 2021-05-24 | End: 2021-05-24

## 2021-05-24 RX ORDER — ACETAMINOPHEN 500 MG
1000 TABLET ORAL EVERY 6 HOURS
Refills: 0 | Status: COMPLETED | OUTPATIENT
Start: 2021-05-25 | End: 2021-05-25

## 2021-05-24 RX ORDER — HYDROMORPHONE HYDROCHLORIDE 2 MG/ML
0.5 INJECTION INTRAMUSCULAR; INTRAVENOUS; SUBCUTANEOUS
Refills: 0 | Status: DISCONTINUED | OUTPATIENT
Start: 2021-05-24 | End: 2021-05-24

## 2021-05-24 RX ORDER — ENOXAPARIN SODIUM 100 MG/ML
40 INJECTION SUBCUTANEOUS ONCE
Refills: 0 | Status: COMPLETED | OUTPATIENT
Start: 2021-05-24 | End: 2021-05-24

## 2021-05-24 RX ADMIN — ENOXAPARIN SODIUM 40 MILLIGRAM(S): 100 INJECTION SUBCUTANEOUS at 09:45

## 2021-05-24 RX ADMIN — HYDROMORPHONE HYDROCHLORIDE 0.5 MILLIGRAM(S): 2 INJECTION INTRAMUSCULAR; INTRAVENOUS; SUBCUTANEOUS at 16:29

## 2021-05-24 RX ADMIN — ENOXAPARIN SODIUM 40 MILLIGRAM(S): 100 INJECTION SUBCUTANEOUS at 21:22

## 2021-05-24 RX ADMIN — HYDROMORPHONE HYDROCHLORIDE 0.5 MILLIGRAM(S): 2 INJECTION INTRAMUSCULAR; INTRAVENOUS; SUBCUTANEOUS at 23:40

## 2021-05-24 RX ADMIN — Medication 516 MILLIGRAM(S): at 15:41

## 2021-05-24 RX ADMIN — SODIUM CHLORIDE 100 MILLILITER(S): 9 INJECTION, SOLUTION INTRAVENOUS at 15:41

## 2021-05-24 RX ADMIN — Medication 1000 MILLIGRAM(S): at 18:33

## 2021-05-24 RX ADMIN — HYDROMORPHONE HYDROCHLORIDE 0.5 MILLIGRAM(S): 2 INJECTION INTRAMUSCULAR; INTRAVENOUS; SUBCUTANEOUS at 17:00

## 2021-05-24 RX ADMIN — CHLORHEXIDINE GLUCONATE 1 APPLICATION(S): 213 SOLUTION TOPICAL at 09:44

## 2021-05-24 RX ADMIN — APREPITANT 40 MILLIGRAM(S): 80 CAPSULE ORAL at 09:43

## 2021-05-24 RX ADMIN — SODIUM CHLORIDE 1000 MILLILITER(S): 9 INJECTION, SOLUTION INTRAVENOUS at 09:43

## 2021-05-24 RX ADMIN — Medication 800 MILLIGRAM(S): at 16:21

## 2021-05-24 RX ADMIN — SODIUM CHLORIDE 150 MILLILITER(S): 9 INJECTION, SOLUTION INTRAVENOUS at 21:25

## 2021-05-24 RX ADMIN — Medication 400 MILLIGRAM(S): at 18:15

## 2021-05-24 NOTE — CONSULT NOTE ADULT - ASSESSMENT
41 yo m pmh DM2 is s/p gastric sleeve    #S/P gastric sleeve  Post op orders per bariatric team  Pain management  Bowl regimen  Pt eval  Obtain baseline labs    #DM2  LDISS  FSBS ACHS    #MADELYN  cpap    #DVT proph  per bariatric
Pt. stable postop gastric sleeve.   Hx Kian on cpap 11.  Suggest ambulate postop, incentive spirometry, cpap hs, dvt prophylaxis.

## 2021-05-24 NOTE — CONSULT NOTE ADULT - SUBJECTIVE AND OBJECTIVE BOX
PULMONARY/CRITICAL CARE        Patient is a 40y old  Male who presents with a chief complaint of s/p gastric sleeve (24 May 2021 16:13)    BRIEF HOSPITAL COURSE: ***    Events last 24 hours: ***    PAST MEDICAL & SURGICAL HISTORY:  Type 2 diabetes mellitus    Sleep apnea  CPAP 11    COVID-19 vaccine series completed  5/11/2021    Seasonal allergies    Severe obesity (BMI &gt;= 40)  BMI 59    Class 3 severe obesity with body mass index (BMI) of 50.0 to 59.9 in adult    No significant past surgical history  No VTE    Allergies    No Known Allergies    Intolerances      FAMILY HISTORY/ social: no cigs; no recent etoh; security  Family history unknown        Review of Systems:  CONSTITUTIONAL: No fever, chills, or fatigue  EYES: No eye pain, visual disturbances, or discharge  ENMT:  No difficulty hearing, tinnitus, vertigo; No sinus or throat pain  NECK: No pain or stiffness  RESPIRATORY: No cough, wheezing, chills or hemoptysis; No shortness of breath  CARDIOVASCULAR: No chest pain, palpitations, dizziness, or leg swelling  GASTROINTESTINAL: mild abdominal  pain. No nausea, vomiting, or hematemesis; No diarrhea or constipation. No melena or hematochezia.  GENITOURINARY: No dysuria, frequency, hematuria, or incontinence  NEUROLOGICAL: No headaches, memory loss, loss of strength, numbness, or tremors  SKIN: No itching, burning, rashes, or lesions   MUSCULOSKELETAL: No joint pain or swelling; No muscle, back, or extremity pain  PSYCHIATRIC: No depression, anxiety, mood swings, or difficulty sleeping      Medications:        acetaminophen  IVPB .. 1000 milliGRAM(s) IV Intermittent once  acetaminophen  IVPB .. 1000 milliGRAM(s) IV Intermittent every 6 hours  HYDROmorphone  Injectable 0.5 milliGRAM(s) IV Push every 10 minutes PRN  ibuprofen IVPB .. 800 milliGRAM(s) IV Intermittent every 6 hours PRN  ondansetron Injectable 4 milliGRAM(s) IV Push once PRN              lactated ringers. 1000 milliLiter(s) IV Continuous <Continuous>                ICU Vital Signs Last 24 Hrs  T(C): 36.9 (24 May 2021 08:54), Max: 36.9 (24 May 2021 08:54)  T(F): 98.4 (24 May 2021 08:54), Max: 98.4 (24 May 2021 08:54)  HR: 589 (24 May 2021 17:00) (58 - 589)  BP: 126/71 (24 May 2021 16:45) (95/45 - 143/77)  BP(mean): --  ABP: --  ABP(mean): --  RR: 11 (24 May 2021 16:45) (11 - 18)  SpO2: 100% (24 May 2021 16:45) (94% - 100%)    Vital Signs Last 24 Hrs  T(C): 36.9 (24 May 2021 08:54), Max: 36.9 (24 May 2021 08:54)  T(F): 98.4 (24 May 2021 08:54), Max: 98.4 (24 May 2021 08:54)  HR: 589 (24 May 2021 17:00) (58 - 589)  BP: 126/71 (24 May 2021 16:45) (95/45 - 143/77)  BP(mean): --  RR: 11 (24 May 2021 16:45) (11 - 18)  SpO2: 100% (24 May 2021 16:45) (94% - 100%)        I&O's Detail        LABS:                CAPILLARY BLOOD GLUCOSE      POCT Blood Glucose.: 84 mg/dL (24 May 2021 08:59)        CULTURES:      Physical Examination:    General: No acute distress.  somewhat sleepy  AA male    HEENT: Pupils equal, reactive to light.  Symmetric.    PULM: Clear to auscultation bilaterally, no significant sputum production    CVS: Regular rate and rhythm, no murmurs, rubs, or gallops    ABD: Soft, nondistended, mildly tender, decreased bowel sounds, no masses    EXT: No edema, nontender calves    SKIN: Warm and well perfused, no rashes noted.    NEURO: Alert, oriented, interactive, nonfocal    RADIOLOGY: ***    CRITICAL CARE TIME SPENT: ***  
Patient is a 40y old  Male who presents with a chief complaint of     HPI:    41 yo m pmh DM2, MADELYN, is s/p gastric sleeve.  Looking forward to recovery.     REVIEW OF SYSTEMS:  CONSTITUTIONAL: No fever, weight loss, or fatigue  RESPIRATORY: No cough, wheezing, chills or hemoptysis; No shortness of breath  CARDIOVASCULAR: No chest pain, palpitations, dizziness, or leg swelling  GASTROINTESTINAL: No abdominal or epigastric pain. No nausea, vomiting, or hematemesis; No diarrhea or constipation. No melena or hematochezia.  GENITOURINARY: No dysuria, frequency, hematuria, or incontinence  NEUROLOGICAL: No headaches, memory loss, loss of strength, numbness, or tremors  MUSCULOSKELETAL: No muscle or back pain  PSYCHIATRIC: No depression, anxiety, mood swings, or difficulty sleeping      PAST MEDICAL & SURGICAL HISTORY:  Type 2 diabetes mellitus    Sleep apnea  CPAP    COVID-19 vaccine series completed  5/11/2021    Seasonal allergies    Severe obesity (BMI &gt;= 40)  BMI 59    Class 3 severe obesity with body mass index (BMI) of 50.0 to 59.9 in adult    No significant past surgical history        SOCIAL HISTORY:  Deniest Tobacco  Denies Etoh  Denies Drugs      Allergies    No Known Allergies    Intolerances        MEDICATIONS  (STANDING):  acetaminophen  IVPB .. 1000 milliGRAM(s) IV Intermittent once  acetaminophen  IVPB .. 1000 milliGRAM(s) IV Intermittent every 6 hours  lactated ringers. 1000 milliLiter(s) (100 mL/Hr) IV Continuous <Continuous>    MEDICATIONS  (PRN):  HYDROmorphone  Injectable 0.5 milliGRAM(s) IV Push every 10 minutes PRN Severe Pain (7 - 10)  ibuprofen IVPB .. 800 milliGRAM(s) IV Intermittent every 6 hours PRN Moderate Pain (4 - 6)  ondansetron Injectable 4 milliGRAM(s) IV Push once PRN Nausea and/or Vomiting      FAMILY HISTORY:  Family history unknown        Vital Signs Last 24 Hrs  T(C): 36.9 (24 May 2021 08:54), Max: 36.9 (24 May 2021 08:54)  T(F): 98.4 (24 May 2021 08:54), Max: 98.4 (24 May 2021 08:54)  HR: 59 (24 May 2021 15:30) (59 - 76)  BP: 107/68 (24 May 2021 15:30) (95/45 - 143/77)  BP(mean): --  RR: 16 (24 May 2021 15:10) (16 - 16)  SpO2: 99% (24 May 2021 15:30) (94% - 100%)    PHYSICAL EXAM:    GENERAL: NAD, well-groomed, well-developed  HEAD:  Atraumatic, Normocephalic  EYES: EOMI, PERRLA, conjunctiva and sclera clear  ENMT: No tonsillar erythema, exudates, or enlargement; Moist mucous membranes, Good dentition, No lesions  NECK: Supple, No JVD, Normal thyroid  NERVOUS SYSTEM:  Alert & Oriented X3, Good concentration; Moving all 4 extremities; No gross sensory deficits  CHEST/LUNG: Clear to auscultation bilaterally; No rales, rhonchi, wheezing, or rubs  HEART: Regular rate and rhythm; No murmurs, rubs, or gallops  ABDOMEN: Soft, Nontender, Nondistended; Bowel sounds present  EXTREMITIES:  2+ Peripheral Pulses, No clubbing, cyanosis, or edema  SKIN: No rashes or lesions  INCISION: intact    LABS:              CAPILLARY BLOOD GLUCOSE      POCT Blood Glucose.: 84 mg/dL (24 May 2021 08:59)      RADIOLOGY & ADDITIONAL STUDIES:    EKG:

## 2021-05-24 NOTE — BRIEF OPERATIVE NOTE - NSICDXBRIEFPROCEDURE_GEN_ALL_CORE_FT
PROCEDURES:  Laparoscopic sleeve gastrectomy with laparoscopic repair of hiatal hernia 24-May-2021 15:40:39  Faviola Webb  EGD 24-May-2021 15:40:49  Faviola Webb

## 2021-05-25 ENCOUNTER — TRANSCRIPTION ENCOUNTER (OUTPATIENT)
Age: 41
End: 2021-05-25

## 2021-05-25 DIAGNOSIS — Z98.84 BARIATRIC SURGERY STATUS: ICD-10-CM

## 2021-05-25 DIAGNOSIS — E66.01 MORBID (SEVERE) OBESITY DUE TO EXCESS CALORIES: ICD-10-CM

## 2021-05-25 DIAGNOSIS — K44.9 DIAPHRAGMATIC HERNIA WITHOUT OBSTRUCTION OR GANGRENE: ICD-10-CM

## 2021-05-25 LAB
ALBUMIN SERPL ELPH-MCNC: 3 G/DL — LOW (ref 3.3–5)
ALP SERPL-CCNC: 60 U/L — SIGNIFICANT CHANGE UP (ref 30–120)
ALT FLD-CCNC: 76 U/L DA — HIGH (ref 10–60)
ANION GAP SERPL CALC-SCNC: 10 MMOL/L — SIGNIFICANT CHANGE UP (ref 5–17)
AST SERPL-CCNC: 51 U/L — HIGH (ref 10–40)
BILIRUB SERPL-MCNC: 0.6 MG/DL — SIGNIFICANT CHANGE UP (ref 0.2–1.2)
BUN SERPL-MCNC: 15 MG/DL — SIGNIFICANT CHANGE UP (ref 7–23)
CALCIUM SERPL-MCNC: 8.5 MG/DL — SIGNIFICANT CHANGE UP (ref 8.4–10.5)
CHLORIDE SERPL-SCNC: 103 MMOL/L — SIGNIFICANT CHANGE UP (ref 96–108)
CO2 SERPL-SCNC: 25 MMOL/L — SIGNIFICANT CHANGE UP (ref 22–31)
CREAT SERPL-MCNC: 1.22 MG/DL — SIGNIFICANT CHANGE UP (ref 0.5–1.3)
GLUCOSE BLDC GLUCOMTR-MCNC: 82 MG/DL — SIGNIFICANT CHANGE UP (ref 70–99)
GLUCOSE BLDC GLUCOMTR-MCNC: 84 MG/DL — SIGNIFICANT CHANGE UP (ref 70–99)
GLUCOSE BLDC GLUCOMTR-MCNC: 88 MG/DL — SIGNIFICANT CHANGE UP (ref 70–99)
GLUCOSE BLDC GLUCOMTR-MCNC: 90 MG/DL — SIGNIFICANT CHANGE UP (ref 70–99)
GLUCOSE BLDC GLUCOMTR-MCNC: 97 MG/DL — SIGNIFICANT CHANGE UP (ref 70–99)
GLUCOSE SERPL-MCNC: 93 MG/DL — SIGNIFICANT CHANGE UP (ref 70–99)
HCT VFR BLD CALC: 41.2 % — SIGNIFICANT CHANGE UP (ref 39–50)
HGB BLD-MCNC: 13 G/DL — SIGNIFICANT CHANGE UP (ref 13–17)
MCHC RBC-ENTMCNC: 25.3 PG — LOW (ref 27–34)
MCHC RBC-ENTMCNC: 31.6 GM/DL — LOW (ref 32–36)
MCV RBC AUTO: 80.3 FL — SIGNIFICANT CHANGE UP (ref 80–100)
NRBC # BLD: 0 /100 WBCS — SIGNIFICANT CHANGE UP (ref 0–0)
PLATELET # BLD AUTO: 242 K/UL — SIGNIFICANT CHANGE UP (ref 150–400)
POTASSIUM SERPL-MCNC: 3.7 MMOL/L — SIGNIFICANT CHANGE UP (ref 3.5–5.3)
POTASSIUM SERPL-SCNC: 3.7 MMOL/L — SIGNIFICANT CHANGE UP (ref 3.5–5.3)
PROT SERPL-MCNC: 7.3 G/DL — SIGNIFICANT CHANGE UP (ref 6–8.3)
RBC # BLD: 5.13 M/UL — SIGNIFICANT CHANGE UP (ref 4.2–5.8)
RBC # FLD: 14.7 % — HIGH (ref 10.3–14.5)
SODIUM SERPL-SCNC: 138 MMOL/L — SIGNIFICANT CHANGE UP (ref 135–145)
WBC # BLD: 14.47 K/UL — HIGH (ref 3.8–10.5)
WBC # FLD AUTO: 14.47 K/UL — HIGH (ref 3.8–10.5)

## 2021-05-25 PROCEDURE — 99232 SBSQ HOSP IP/OBS MODERATE 35: CPT

## 2021-05-25 RX ORDER — DULAGLUTIDE 4.5 MG/.5ML
0 INJECTION, SOLUTION SUBCUTANEOUS
Qty: 0 | Refills: 0 | DISCHARGE

## 2021-05-25 RX ORDER — SODIUM CHLORIDE 9 MG/ML
500 INJECTION INTRAMUSCULAR; INTRAVENOUS; SUBCUTANEOUS ONCE
Refills: 0 | Status: COMPLETED | OUTPATIENT
Start: 2021-05-25 | End: 2021-05-25

## 2021-05-25 RX ORDER — METFORMIN HYDROCHLORIDE 850 MG/1
0 TABLET ORAL
Qty: 0 | Refills: 0 | DISCHARGE

## 2021-05-25 RX ORDER — BUPROPION HYDROCHLORIDE 150 MG/1
1 TABLET, EXTENDED RELEASE ORAL
Qty: 0 | Refills: 0 | DISCHARGE

## 2021-05-25 RX ORDER — ACETAMINOPHEN 500 MG
1000 TABLET ORAL EVERY 6 HOURS
Refills: 0 | Status: DISCONTINUED | OUTPATIENT
Start: 2021-05-26 | End: 2021-05-26

## 2021-05-25 RX ORDER — ONDANSETRON 8 MG/1
1 TABLET, FILM COATED ORAL
Qty: 0 | Refills: 0 | DISCHARGE

## 2021-05-25 RX ORDER — ACETAMINOPHEN 500 MG
2 TABLET ORAL
Qty: 0 | Refills: 0 | DISCHARGE

## 2021-05-25 RX ORDER — SODIUM CHLORIDE 9 MG/ML
1000 INJECTION, SOLUTION INTRAVENOUS ONCE
Refills: 0 | Status: COMPLETED | OUTPATIENT
Start: 2021-05-25 | End: 2021-05-25

## 2021-05-25 RX ORDER — OMEPRAZOLE 10 MG/1
1 CAPSULE, DELAYED RELEASE ORAL
Qty: 0 | Refills: 0 | DISCHARGE

## 2021-05-25 RX ORDER — ERGOCALCIFEROL 1.25 MG/1
0 CAPSULE ORAL
Qty: 0 | Refills: 0 | DISCHARGE

## 2021-05-25 RX ORDER — OXYCODONE HYDROCHLORIDE 5 MG/1
5 TABLET ORAL EVERY 6 HOURS
Refills: 0 | Status: DISCONTINUED | OUTPATIENT
Start: 2021-05-25 | End: 2021-05-27

## 2021-05-25 RX ORDER — TOPIRAMATE 25 MG
3 TABLET ORAL
Qty: 0 | Refills: 0 | DISCHARGE

## 2021-05-25 RX ORDER — OXYCODONE HYDROCHLORIDE 5 MG/1
1 TABLET ORAL
Qty: 0 | Refills: 0 | DISCHARGE

## 2021-05-25 RX ADMIN — ONDANSETRON 4 MILLIGRAM(S): 8 TABLET, FILM COATED ORAL at 05:32

## 2021-05-25 RX ADMIN — ONDANSETRON 4 MILLIGRAM(S): 8 TABLET, FILM COATED ORAL at 17:02

## 2021-05-25 RX ADMIN — SODIUM CHLORIDE 150 MILLILITER(S): 9 INJECTION, SOLUTION INTRAVENOUS at 11:40

## 2021-05-25 RX ADMIN — Medication 800 MILLIGRAM(S): at 09:31

## 2021-05-25 RX ADMIN — SODIUM CHLORIDE 500 MILLILITER(S): 9 INJECTION, SOLUTION INTRAVENOUS at 16:17

## 2021-05-25 RX ADMIN — HYDROMORPHONE HYDROCHLORIDE 0.5 MILLIGRAM(S): 2 INJECTION INTRAMUSCULAR; INTRAVENOUS; SUBCUTANEOUS at 10:00

## 2021-05-25 RX ADMIN — SODIUM CHLORIDE 500 MILLILITER(S): 9 INJECTION, SOLUTION INTRAVENOUS at 20:35

## 2021-05-25 RX ADMIN — ONDANSETRON 4 MILLIGRAM(S): 8 TABLET, FILM COATED ORAL at 23:11

## 2021-05-25 RX ADMIN — SODIUM CHLORIDE 150 MILLILITER(S): 9 INJECTION, SOLUTION INTRAVENOUS at 20:35

## 2021-05-25 RX ADMIN — ONDANSETRON 4 MILLIGRAM(S): 8 TABLET, FILM COATED ORAL at 11:40

## 2021-05-25 RX ADMIN — SODIUM CHLORIDE 500 MILLILITER(S): 9 INJECTION INTRAMUSCULAR; INTRAVENOUS; SUBCUTANEOUS at 12:24

## 2021-05-25 RX ADMIN — OXYCODONE HYDROCHLORIDE 5 MILLIGRAM(S): 5 TABLET ORAL at 20:40

## 2021-05-25 RX ADMIN — HYDROMORPHONE HYDROCHLORIDE 0.5 MILLIGRAM(S): 2 INJECTION INTRAMUSCULAR; INTRAVENOUS; SUBCUTANEOUS at 09:33

## 2021-05-25 RX ADMIN — PANTOPRAZOLE SODIUM 40 MILLIGRAM(S): 20 TABLET, DELAYED RELEASE ORAL at 11:39

## 2021-05-25 RX ADMIN — Medication 400 MILLIGRAM(S): at 00:07

## 2021-05-25 RX ADMIN — ONDANSETRON 4 MILLIGRAM(S): 8 TABLET, FILM COATED ORAL at 00:07

## 2021-05-25 RX ADMIN — Medication 1000 MILLIGRAM(S): at 05:32

## 2021-05-25 RX ADMIN — Medication 1000 MILLIGRAM(S): at 18:07

## 2021-05-25 RX ADMIN — Medication 400 MILLIGRAM(S): at 11:48

## 2021-05-25 RX ADMIN — SODIUM CHLORIDE 1000 MILLILITER(S): 9 INJECTION, SOLUTION INTRAVENOUS at 06:52

## 2021-05-25 RX ADMIN — Medication 400 MILLIGRAM(S): at 05:32

## 2021-05-25 RX ADMIN — Medication 516 MILLIGRAM(S): at 08:56

## 2021-05-25 RX ADMIN — Medication 1000 MILLIGRAM(S): at 00:07

## 2021-05-25 RX ADMIN — OXYCODONE HYDROCHLORIDE 5 MILLIGRAM(S): 5 TABLET ORAL at 21:10

## 2021-05-25 RX ADMIN — HYDROMORPHONE HYDROCHLORIDE 0.5 MILLIGRAM(S): 2 INJECTION INTRAMUSCULAR; INTRAVENOUS; SUBCUTANEOUS at 00:01

## 2021-05-25 RX ADMIN — Medication 1000 MILLIGRAM(S): at 12:30

## 2021-05-25 RX ADMIN — Medication 400 MILLIGRAM(S): at 18:05

## 2021-05-25 RX ADMIN — SODIUM CHLORIDE 150 MILLILITER(S): 9 INJECTION, SOLUTION INTRAVENOUS at 06:49

## 2021-05-25 RX ADMIN — ENOXAPARIN SODIUM 40 MILLIGRAM(S): 100 INJECTION SUBCUTANEOUS at 17:02

## 2021-05-25 NOTE — PROGRESS NOTE ADULT - SUBJECTIVE AND OBJECTIVE BOX
Pre-Op Dx: Morbid obesity  Procedure: Laparoscopic sleeve gastrectomy with laparoscopic repair of hiatal hernia and intraoperative EGD  Surgeon: Jackeline    HPI:   41 yo Male s/p Laparoscopic sleeve gastrectomy with laparoscopic repair of hiatal hernia and intraoperative EGD POD # 1. Ambulating on the floor and utilizing incentive spirometry. Tolerating clear liquid diet and urinating well. Minimal incisional discomfort. Denies any nausea or vomiting. Pt seen and examined at the bedside.     VITALS:  Vital Signs Last 24 Hrs  T(C): 36.7 (25 May 2021 07:34), Max: 37.2 (24 May 2021 21:28)  T(F): 98.1 (25 May 2021 07:34), Max: 98.9 (24 May 2021 21:28)  HR: 59 (25 May 2021 07:34) (58 - 76)  BP: 126/80 (25 May 2021 07:34) (95/45 - 155/90)  BP(mean): --  RR: 16 (25 May 2021 07:34) (13 - 19)  SpO2: 97% (25 May 2021 07:34) (94% - 100%)    05-24 @ 07:01  -  05-25 @ 07:00  --------------------------------------------------------  IN: 5850 mL / OUT: 1025 mL / NET: 4825 mL        LABS:                        13.0   14.47 )-----------( 242      ( 25 May 2021 07:46 )             41.2         CAPILLARY BLOOD GLUCOSE      POCT Blood Glucose.: 88 mg/dL (25 May 2021 07:54)  POCT Blood Glucose.: 97 mg/dL (25 May 2021 05:32)  POCT Blood Glucose.: 104 mg/dL (24 May 2021 23:23)  POCT Blood Glucose.: 84 mg/dL (24 May 2021 08:59)          Physical Exam:  General: A/O x 3, NAD, sitting up comfortably in bed  Abdominal: soft, ND, nontender to palpation, incisions C/D/I  Extremities: no edema, no calf tenderness B/L         Pre-Op Dx: Morbid obesity  Procedure: Laparoscopic sleeve gastrectomy with laparoscopic repair of hiatal hernia and intraoperative EGD  Surgeon: Jackeline    HPI:   41 yo Male s/p Laparoscopic sleeve gastrectomy with laparoscopic repair of hiatal hernia and intraoperative EGD POD # 1. Ambulating on the floor and utilizing incentive spirometry. Tolerating clear liquid diet and urinating well. Minimal incisional discomfort. Denies any nausea or vomiting. Pt seen and examined at the bedside.     VITALS:  Vital Signs Last 24 Hrs  T(C): 36.7 (25 May 2021 07:34), Max: 37.2 (24 May 2021 21:28)  T(F): 98.1 (25 May 2021 07:34), Max: 98.9 (24 May 2021 21:28)  HR: 59 (25 May 2021 07:34) (58 - 66)  BP: 126/80 (25 May 2021 07:34) (95/45 - 155/90)  BP(mean): --  RR: 16 (25 May 2021 07:34) (13 - 19)  SpO2: 97% (25 May 2021 07:34) (94% - 100%)        05-24-21 @ 07:01  -  05-25-21 @ 07:00  --------------------------------------------------------  IN: 5850 mL / OUT: 1025 mL / NET: 4825 mL    LABS:                          13.0   14.47 )-----------( 242      ( 25 May 2021 07:46 )             41.2         CAPILLARY BLOOD GLUCOSE      POCT Blood Glucose.: 88 mg/dL (25 May 2021 07:54)  POCT Blood Glucose.: 97 mg/dL (25 May 2021 05:32)  POCT Blood Glucose.: 104 mg/dL (24 May 2021 23:23)        Physical Exam:  General: A/O x 3, NAD, sitting up comfortably in bed  Abdominal: soft, ND, nontender to palpation, incisions C/D/I  Extremities: no edema, no calf tenderness B/L

## 2021-05-25 NOTE — ASSESSMENT
[FreeTextEntry1] : 39 yo M with longstanding history of obesity scheduled for laparoscopic sleeve gastrectomy on 5/24. Encouraged to lose weight prior to surgery. Nutrition and exercise guidelines were reviewed with the patient. Procedure risks and benefits were again discussed with patient. All questions were answered.\par \par Surgery 5/24\par Continue preoperative modified liquid diet\par Addendum to cardiac clearance to address results of stress test\par Call if any questions or concerns\par All questions answered, ready for surgery

## 2021-05-25 NOTE — PROGRESS NOTE ADULT - ASSESSMENT
41 yo m pmh DM2 is s/p gastric sleeve    #S/P gastric sleeve  Post op orders per bariatric team  Pain management  Bowl regimen  Pt eval  labs reviewed, leukocytosis 2/2 likely reactive    #rule out urinary retention  ordered another 500ml bolus, reassess with bladder scan if not voiding    #DM2  LDISS  FSBS ACHS    #MADELYN  cpap    #DVT proph  per bariatric

## 2021-05-25 NOTE — DISCHARGE NOTE PROVIDER - NSDCFUSCHEDAPPT_GEN_ALL_CORE_FT
ROLLY LEAL ; 06/01/2021 ; [facility] [id number]  ROLLY LEAL ; 06/24/2021 ; [facility] [id number]  ROLLY LEAL ; 07/21/2021 ; [facility] [address]  ROLLY LEAL ; 08/04/2021 ; NPP [facility] [address] [place] [address]

## 2021-05-25 NOTE — HISTORY OF PRESENT ILLNESS
[de-identified] : 39 yo M with longstanding history of morbid obesity scheduled for laparoscopic sleeve gastrectomy on 5/24. Lost weight since last visit. On modified liquid diet last week and is following guidelines. Walking frequently for exercise. Has severe MADELYN and uses CPAP nightly. Had cardiac stress testing earlier this week and received medical clearance yesterday.

## 2021-05-25 NOTE — DISCHARGE NOTE PROVIDER - CARE PROVIDER_API CALL
Marija Viveros (MD)  Surgery  [address] [place] [address]  Phone: [phone]  Fax: [phone]  Follow Up Time:

## 2021-05-25 NOTE — PROGRESS NOTE ADULT - SUBJECTIVE AND OBJECTIVE BOX
Patient is a 40y old  Male who presents with a chief complaint of 39 yo F with PMHx of morbid obesity admitted to [facility] for scheduled laparoscopic sleeve gastrectomy and intra-operative EGD and hiatal hernia repair.  (25 May 2021 08:50)      INTERVAL HPI/OVERNIGHT EVENTS:    voided this AM at 5:30, dark yellow.  hasn't voided since.  otherwise no other issues except some pain from incision site.     MEDICATIONS  (STANDING):  dextrose 40% Gel 15 Gram(s) Oral once  dextrose 5%. 1000 milliLiter(s) (50 mL/Hr) IV Continuous <Continuous>  dextrose 5%. 1000 milliLiter(s) (100 mL/Hr) IV Continuous <Continuous>  dextrose 50% Injectable 25 Gram(s) IV Push once  dextrose 50% Injectable 12.5 Gram(s) IV Push once  dextrose 50% Injectable 25 Gram(s) IV Push once  enoxaparin Injectable 40 milliGRAM(s) SubCutaneous every 12 hours  glucagon  Injectable 1 milliGRAM(s) IntraMuscular once  insulin lispro (ADMELOG) corrective regimen sliding scale   SubCutaneous three times a day before meals  lactated ringers. 1000 milliLiter(s) (150 mL/Hr) IV Continuous <Continuous>  ondansetron Injectable 4 milliGRAM(s) IV Push every 6 hours  pantoprazole  Injectable 40 milliGRAM(s) IV Push daily  sodium chloride 0.9% Bolus 500 milliLiter(s) IV Bolus once    MEDICATIONS  (PRN):  acetaminophen  IVPB .. 1000 milliGRAM(s) IV Intermittent every 6 hours PRN Mild Pain (1 - 3)  HYDROmorphone  Injectable 0.5 milliGRAM(s) IV Push every 4 hours PRN Severe Pain (7 - 10)  hyoscyamine SL 0.125 milliGRAM(s) SubLingual every 6 hours PRN n/v  ibuprofen IVPB .. 800 milliGRAM(s) IV Intermittent every 6 hours PRN Moderate Pain (4 - 6)      Allergies    No Known Allergies    Intolerances        Vital Signs Last 24 Hrs  T(C): 36.6 (25 May 2021 11:34), Max: 37.2 (24 May 2021 21:28)  T(F): 97.8 (25 May 2021 11:34), Max: 98.9 (24 May 2021 21:28)  HR: 85 (25 May 2021 11:34) (58 - 85)  BP: 138/81 (25 May 2021 11:34) (95/45 - 155/90)  BP(mean): --  RR: 16 (25 May 2021 11:34) (13 - 19)  SpO2: 92% (25 May 2021 11:34) (92% - 100%)    PHYSICAL EXAM:  GENERAL: NAD, well-groomed, well-developed  HEAD:  Atraumatic, Normocephalic  EYES: EOMI, PERRLA, conjunctiva and sclera clear  ENMT: Moist mucous membranes, No lesions; No tonsillar erythema, exudates, or enlargement  NECK: Supple, No JVD, Normal thyroid  NERVOUS SYSTEM:  Alert & Oriented X3, Good concentration; All 4 extremities mobile, no gross sensory deficits.   CHEST/LUNG: Clear to auscultation bilaterally; No rales, rhonchi, wheezing, or rubs  HEART: Regular rate and rhythm; No murmurs, rubs, or gallops  ABDOMEN: Soft, Nontender, Nondistended; Bowel sounds present  EXTREMITIES:  2+ Peripheral Pulses, No clubbing, cyanosis, or edema  LYMPH: No lymphadenopathy noted  SKIN: No rashes or lesions, incisions intact    LABS:                        13.0   14.47 )-----------( 242      ( 25 May 2021 07:46 )             41.2     25 May 2021 07:46    138    |  103    |  15     ----------------------------<  93     3.7     |  25     |  1.22     Ca    8.5        25 May 2021 07:46    TPro  7.3    /  Alb  3.0    /  TBili  0.6    /  DBili  x      /  AST  51     /  ALT  76     /  AlkPhos  60     25 May 2021 07:46        CAPILLARY BLOOD GLUCOSE      POCT Blood Glucose.: 90 mg/dL (25 May 2021 12:04)  POCT Blood Glucose.: 88 mg/dL (25 May 2021 07:54)  POCT Blood Glucose.: 97 mg/dL (25 May 2021 05:32)  POCT Blood Glucose.: 104 mg/dL (24 May 2021 23:23)      RADIOLOGY & ADDITIONAL TESTS:    Imaging Personally Reviewed:  [ ] YES     Consultant(s) Notes Reviewed:      Care Discussed with Consultants/Other Providers:    Advanced Directives: [ ] DNR  [ ] No feeding tube  [ ] MOLST in chart  [ ] MOLST completed today  [ ] Unknown

## 2021-05-25 NOTE — ANESTHESIA FOLLOW-UP NOTE - NSEVALATION_GEN_ALL_CORE
No apparent complications or complaints regarding anesthesia care at this time
The mother chose not to exclusively breastfeed upon admission.

## 2021-05-25 NOTE — PHYSICAL EXAM
[Obese] : obese [Normal] : affect appropriate [de-identified] : EOMI, anicteric  [de-identified] : morbidly obese, soft, nontender, no evidence of hernia.

## 2021-05-25 NOTE — DISCHARGE NOTE PROVIDER - HOSPITAL COURSE
39 yo F with PMHx of morbid obesity admitted to [facility] for scheduled laparoscopic sleeve gastrectomy and intra-operative EGD and hiatal hernia repair. Post operatively patient did well, good urine output and ambulating well on floor. Pt advanced to bariatric clears which she tolerated . Nutritional guidelines were reviewed with the nutritionist. Patient felt ready for discharge to home. Pt instructed to drink small frequent amounts, start protein drinks and follow dietary guidelines. Instructed to ambulate and use incentive spirometry frequently. Pt to follow up with Dr. Viveros in 1 week and call with any questions or concerns. 41 yo F with PMHx of morbid obesity admitted to [facility] for scheduled laparoscopic sleeve gastrectomy and intra-operative EGD and hiatal hernia repair. Post operatively patient did well, good urine output and ambulating well on floor. Pt advanced to bariatric clears which he tolerated . Pt had some difficulty tolerating bariatric clear liquids and difficulty with mobility and transfers- out of bed. Pt was evaluated and treated by Physical Therapy. Pt was educated on use of Lovenox SQ injections at home. Nutritional guidelines were reviewed with the nutritionist. Patient felt ready for discharge to home on post op day #3. Pt instructed to drink small frequent amounts, start protein drinks and follow dietary guidelines. Instructed to ambulate and use incentive spirometry frequently. Pt to follow up with Dr. Viveros in 1 week and call with any questions or concerns. 41 yo M with PMHx of morbid obesity admitted to [facility] for scheduled laparoscopic sleeve gastrectomy and intra-operative EGD and hiatal hernia repair. Post operatively patient did well, good urine output and ambulating well on floor. Pt advanced to bariatric clears which he tolerated . Pt had some difficulty tolerating bariatric clear liquids and difficulty with mobility and transfers- out of bed. Pt was evaluated and treated by Physical Therapy. Pt was educated on use of Lovenox SQ injections at home. Nutritional guidelines were reviewed with the nutritionist. Patient felt ready for discharge to home on post op day #3. Pt instructed to drink small frequent amounts, start protein drinks and follow dietary guidelines. Instructed to ambulate and use incentive spirometry frequently. Pt to follow up with Dr. Viveros in 1 week and call with any questions or concerns.

## 2021-05-25 NOTE — DISCHARGE NOTE PROVIDER - NSDCMRMEDTOKEN_GEN_ALL_CORE_FT
acetaminophen 500 mg oral powder: 2 packet(s) orally every 8 hours for mild pain not to exceed 5 days   metFORMIN 750 mg oral tablet, extended release: continue or discontinue as advised by PCP or Endocrinologist.   omeprazole 20 mg oral delayed release capsule: 1 cap(s) orally once a day open and put in low fat yogurt or pudding.   ondansetron 4 mg oral tablet, disintegratin tab(s) orally 3 times a day as needed for nausea   oxyCODONE 5 mg oral tablet: 1 tab(s) orally every 6 hours as needed for moderate to severe pain   topiramate 25 mg oral tablet: 3 tab(s) orally once a day (at bedtime)  Trulicity Pen 1.5 mg/0.5 mL subcutaneous solution:   ZyrTEC 10 mg oral tablet: 1 tab(s) orally once a day   acetaminophen 500 mg oral powder: 2 packet(s) orally every 8 hours for mild pain not to exceed 5 days   omeprazole 20 mg oral delayed release capsule: 1 cap(s) orally once a day open and put in low fat yogurt or pudding.   ondansetron 4 mg oral tablet, disintegratin tab(s) orally 3 times a day as needed for nausea   oxyCODONE 5 mg oral tablet: 1 tab(s) orally every 6 hours as needed for moderate to severe pain   ZyrTEC 10 mg oral tablet: 1 tab(s) orally once a day   acetaminophen 500 mg oral powder: 2 packet(s) orally every 8 hours for mild pain not to exceed 5 days   enoxaparin 40 mg/0.4 mL injectable solution: 40 milligram(s) subcutaneously 2 times a day   omeprazole 20 mg oral delayed release capsule: 1 cap(s) orally once a day open and put in low fat yogurt or pudding.   ondansetron 4 mg oral tablet, disintegratin tab(s) orally 3 times a day as needed for nausea   oxyCODONE 5 mg oral tablet: 1 tab(s) orally every 6 hours as needed for moderate to severe pain   ZyrTEC 10 mg oral tablet: 1 tab(s) orally once a day   acetaminophen 500 mg oral powder: 2 packet(s) orally every 8 hours for mild pain not to exceed 5 days   enoxaparin 40 mg/0.4 mL injectable solution: 40 milligram(s) subcutaneously 2 times a day x 2 weeks    omeprazole 20 mg oral delayed release capsule: 1 cap(s) orally once a day open and put in low fat yogurt or pudding.   ondansetron 4 mg oral tablet, disintegratin tab(s) orally 3 times a day as needed for nausea   oxyCODONE 5 mg oral tablet: 1 tab(s) orally every 6 hours as needed for moderate to severe pain   ZyrTEC 10 mg oral tablet: 1 tab(s) orally once a day

## 2021-05-25 NOTE — PROGRESS NOTE ADULT - SUBJECTIVE AND OBJECTIVE BOX
PULMONARY/CRITICAL CARE    DOS 5/25/21  Mild abdominal pain. Ambulated. Used cpap.     Patient is a 40y old  Male who presents with a chief complaint of s/p gastric sleeve (24 May 2021 16:13)    BRIEF HOSPITAL COURSE: ***    Events last 24 hours: ***    PAST MEDICAL & SURGICAL HISTORY:  Type 2 diabetes mellitus    Sleep apnea  CPAP 11    COVID-19 vaccine series completed  5/11/2021    Seasonal allergies    Severe obesity (BMI &gt;= 40)  BMI 59    Class 3 severe obesity with body mass index (BMI) of 50.0 to 59.9 in adult    No significant past surgical history  No VTE    Allergies    No Known Allergies    Intolerances      FAMILY HISTORY/ social: no cigs; no recent etoh; security  Family history unknown          Medications:        acetaminophen  IVPB .. 1000 milliGRAM(s) IV Intermittent once  acetaminophen  IVPB .. 1000 milliGRAM(s) IV Intermittent every 6 hours  HYDROmorphone  Injectable 0.5 milliGRAM(s) IV Push every 10 minutes PRN  ibuprofen IVPB .. 800 milliGRAM(s) IV Intermittent every 6 hours PRN  ondansetron Injectable 4 milliGRAM(s) IV Push once PRN              lactated ringers. 1000 milliLiter(s) IV Continuous <Continuous>                ICU Vital Signs Last 24 Hrs  T(C): 36.9 (24 May 2021 08:54), Max: 36.9 (24 May 2021 08:54)  T(F): 98.4 (24 May 2021 08:54), Max: 98.4 (24 May 2021 08:54)  HR: 589 (24 May 2021 17:00) (58 - 589)  BP: 126/71 (24 May 2021 16:45) (95/45 - 143/77)  BP(mean): --  ABP: --  ABP(mean): --  RR: 11 (24 May 2021 16:45) (11 - 18)  SpO2: 100% (24 May 2021 16:45) (94% - 100%)    Vital Signs Last 24 Hrs  T(C): 36.9 (24 May 2021 08:54), Max: 36.9 (24 May 2021 08:54)  T(F): 98.4 (24 May 2021 08:54), Max: 98.4 (24 May 2021 08:54)  HR: 589 (24 May 2021 17:00) (58 - 589)  BP: 126/71 (24 May 2021 16:45) (95/45 - 143/77)  BP(mean): --  RR: 11 (24 May 2021 16:45) (11 - 18)  SpO2: 100% (24 May 2021 16:45) (94% - 100%)        I&O's Detail        LABS:                CAPILLARY BLOOD GLUCOSE      POCT Blood Glucose.: 84 mg/dL (24 May 2021 08:59)        CULTURES:      Physical Examination:    General: No acute distress.   AA male    HEENT: Pupils equal, reactive to light.  Symmetric.    PULM: Clear to auscultation bilaterally, no significant sputum production    CVS: Regular rate and rhythm, no murmurs, rubs, or gallops    ABD: Soft, nondistended, mildly tender, decreased bowel sounds, no masses    EXT: No edema, nontender calves    SKIN: Warm and well perfused, no rashes noted.    NEURO: Alert, oriented, interactive, nonfocal    RADIOLOGY: ***    CRITICAL CARE TIME SPENT: ***

## 2021-05-25 NOTE — PROGRESS NOTE ADULT - ASSESSMENT
Pt. stable postop gastric sleeve.   Doing well.   Hx Kina on cpap 11.  Suggest ambulate postop, incentive spirometry, cpap hs, fu pulmonary.

## 2021-05-25 NOTE — PROGRESS NOTE ADULT - ASSESSMENT
41 yo Male POD #1 s/p Laparoscopic sleeve gastrectomy with laparoscopic repair of hiatal hernia and intraoperative EGD is doing well and hemodynamically stable. Received additional IV bolus for low UO. Monitor UO and bolus again if needed.

## 2021-05-25 NOTE — REVIEW OF SYSTEMS
Lucretia archer [Recent Change In Weight] : ~T recent weight change [Negative] : Endocrine [Dysphagia] : no dysphagia [Chest Pain] : no chest pain [Palpitations] : no palpitations [Shortness Of Breath] : no shortness of breath [Wheezing] : no wheezing [Abdominal Pain] : no abdominal pain [Vomiting] : no vomiting [Constipation] : no constipation [Diarrhea] : no diarrhea [Reflux/Heartburn] : no reflex/heartburn [FreeTextEntry2] : weight loss

## 2021-05-25 NOTE — DISCHARGE NOTE PROVIDER - NSDCCPCAREPLAN_GEN_ALL_CORE_FT
PRINCIPAL DISCHARGE DIAGNOSIS  Diagnosis: Morbid obesity  Assessment and Plan of Treatment: Instructed to ambulate and use incentive spirometry frequently. Ice packs to abdominal wall and shoulders as needed for discomfort. Cont bariatric Continue Bariatric Clear Diet . Plan to start Protein shakes at home . Avoid long heat exposure -outdoors. and follow nutritional guidelines as instructed. Pain meds as needed by MD. Pt instructed  to follow up with Dr. Viveros in 1 week.      SECONDARY DISCHARGE DIAGNOSES  Diagnosis: S/P bariatric surgery  Assessment and Plan of Treatment: Instructed to ambulate and use incentive spirometry frequently. Ice packs to abdominal wall and shoulders as needed for discomfort. Cont bariatric Continue Bariatric Clear Diet . Plan to start Protein shakes at home . Avoid long heat exposure -outdoors. and follow nutritional guidelines as instructed. Pain meds as needed by MD. Pt instructed  to follow up with Dr. Viveros  in 1 week.

## 2021-05-25 NOTE — DISCHARGE NOTE PROVIDER - REASON FOR ADMISSION
39 yo F with PMHx of morbid obesity admitted to [facility] for scheduled laparoscopic sleeve gastrectomy and intra-operative EGD and hiatal hernia repair.  41 yo M with PMHx of morbid obesity admitted to [facility] for scheduled laparoscopic sleeve gastrectomy and intra-operative EGD and hiatal hernia repair.

## 2021-05-26 LAB
GLUCOSE BLDC GLUCOMTR-MCNC: 82 MG/DL — SIGNIFICANT CHANGE UP (ref 70–99)
GLUCOSE BLDC GLUCOMTR-MCNC: 82 MG/DL — SIGNIFICANT CHANGE UP (ref 70–99)
GLUCOSE BLDC GLUCOMTR-MCNC: 85 MG/DL — SIGNIFICANT CHANGE UP (ref 70–99)
GLUCOSE BLDC GLUCOMTR-MCNC: 91 MG/DL — SIGNIFICANT CHANGE UP (ref 70–99)
SURGICAL PATHOLOGY STUDY: SIGNIFICANT CHANGE UP

## 2021-05-26 PROCEDURE — 99232 SBSQ HOSP IP/OBS MODERATE 35: CPT

## 2021-05-26 RX ORDER — ACETAMINOPHEN 500 MG
1000 TABLET ORAL EVERY 6 HOURS
Refills: 0 | Status: DISCONTINUED | OUTPATIENT
Start: 2021-05-26 | End: 2021-05-27

## 2021-05-26 RX ORDER — ENOXAPARIN SODIUM 100 MG/ML
40 INJECTION SUBCUTANEOUS
Qty: 28 | Refills: 0
Start: 2021-05-26

## 2021-05-26 RX ORDER — SODIUM CHLORIDE 9 MG/ML
1000 INJECTION, SOLUTION INTRAVENOUS ONCE
Refills: 0 | Status: COMPLETED | OUTPATIENT
Start: 2021-05-26 | End: 2021-05-26

## 2021-05-26 RX ADMIN — ENOXAPARIN SODIUM 40 MILLIGRAM(S): 100 INJECTION SUBCUTANEOUS at 18:05

## 2021-05-26 RX ADMIN — ENOXAPARIN SODIUM 40 MILLIGRAM(S): 100 INJECTION SUBCUTANEOUS at 06:17

## 2021-05-26 RX ADMIN — Medication 1000 MILLIGRAM(S): at 17:05

## 2021-05-26 RX ADMIN — Medication 400 MILLIGRAM(S): at 06:18

## 2021-05-26 RX ADMIN — ONDANSETRON 4 MILLIGRAM(S): 8 TABLET, FILM COATED ORAL at 06:18

## 2021-05-26 RX ADMIN — SODIUM CHLORIDE 150 MILLILITER(S): 9 INJECTION, SOLUTION INTRAVENOUS at 22:24

## 2021-05-26 RX ADMIN — ONDANSETRON 4 MILLIGRAM(S): 8 TABLET, FILM COATED ORAL at 18:05

## 2021-05-26 RX ADMIN — Medication 1000 MILLIGRAM(S): at 06:48

## 2021-05-26 RX ADMIN — PANTOPRAZOLE SODIUM 40 MILLIGRAM(S): 20 TABLET, DELAYED RELEASE ORAL at 12:42

## 2021-05-26 RX ADMIN — ONDANSETRON 4 MILLIGRAM(S): 8 TABLET, FILM COATED ORAL at 12:42

## 2021-05-26 RX ADMIN — Medication 400 MILLIGRAM(S): at 16:35

## 2021-05-26 RX ADMIN — SODIUM CHLORIDE 150 MILLILITER(S): 9 INJECTION, SOLUTION INTRAVENOUS at 06:19

## 2021-05-26 RX ADMIN — ONDANSETRON 4 MILLIGRAM(S): 8 TABLET, FILM COATED ORAL at 23:22

## 2021-05-26 RX ADMIN — SODIUM CHLORIDE 500 MILLILITER(S): 9 INJECTION, SOLUTION INTRAVENOUS at 16:35

## 2021-05-26 RX ADMIN — SODIUM CHLORIDE 1000 MILLILITER(S): 9 INJECTION, SOLUTION INTRAVENOUS at 08:07

## 2021-05-26 NOTE — PROGRESS NOTE ADULT - ASSESSMENT
41 yo m pmh DM2 is s/p gastric sleeve    #s/p Gastric Sleeve  - Pain control  - Bowel regimen  - PT/OT  - Incentive spirometry  - advance diet per surgery  - VTE PPx - Lovenox    #DM2  low dose sliding scale  upon discharge - resume metformin in 3 days , resume trulicity in 1 week (as long as tolerating PO)    #MADELYN  cpap      41 yo m pmh DM2 is s/p gastric sleeve    #s/p Gastric Sleeve  - Pain control  - Bowel regimen  - PT/OT  - Incentive spirometry  - advance diet per surgery  - VTE PPx - Lovenox    #DM2  low dose sliding scale  upon discharge - hold metformin and trulicity, f/u with obesity medicine for when to resume    #MADELYN  cpap

## 2021-05-26 NOTE — PROGRESS NOTE ADULT - ASSESSMENT
Pt. stable postop gastric sleeve.   Should be able to go home today.   Doing well.   Hx Kian on cpap 11.  Suggest ambulate postop, incentive spirometry, cpap hs, fu pulmonary.

## 2021-05-26 NOTE — PROGRESS NOTE ADULT - REASON FOR ADMISSION
Patient is a 40y old  Male who presents with a chief complaint of 39 yo F with PMHx of morbid obesity admitted to [facility] for scheduled laparoscopic sleeve gastrectomy and intra-operative EGD and hiatal hernia repair.  (25 May 2021 08:50)

## 2021-05-26 NOTE — PROGRESS NOTE ADULT - SUBJECTIVE AND OBJECTIVE BOX
INTERVAL HPI/OVERNIGHT EVENTS:   Patient seen and examined.  Tolerating clears, voiding, +flatus.  No fevers, chills, sweats, dizziness, HA, changes in vision, cp, palpitations, sob, persistent cough, n/v/d, dysuria, focal weakness, or calf pain.      REVIEW OF SYSTEMS:  See HPI,  all others negative    PHYSICAL EXAM:  Vital Signs Last 24 Hrs  T(C): 37.1 (26 May 2021 03:11), Max: 37.1 (26 May 2021 03:11)  T(F): 98.8 (26 May 2021 03:11), Max: 98.8 (26 May 2021 03:11)  HR: 64 (26 May 2021 03:18) (55 - 85)  BP: 134/72 (26 May 2021 03:11) (125/68 - 138/83)  BP(mean): --  RR: 16 (26 May 2021 03:11) (16 - 16)  SpO2: 94% (26 May 2021 03:18) (92% - 97%)    GENERAL: NAD, well-groomed, well-developed, awake, alert, oriented x 3, fluent and coherent speech  EYES: EOMI, PERRLA, conjunctiva and sclera clear  ENMT: No tonsillar erythema, exudates, or enlargement; Moist mucous membranes,   No lesions seen on oral mucosa  NECK: Supple, No JVD, No Cervical LAD, No thyromegaly, No thyroid nodules felt  NERVOUS SYSTEM:  Good concentration; Moving all 4 extremities against gravity and resistance; No gross sensory deficits, No facial droop  CHEST WALL: No masses  CHEST/LUNG: Clear to auscultation bilaterally with good air entry; No rales, rhonchi, wheezing, or rubs  HEART: Regular rate and rhythm; No murmurs, rubs, or gallops  ABDOMEN: Soft, tender near incision sites, obese, hypoactive bowel sounds present, No palpable masses or organomegaly, No bruits  EXTREMITIES:  2+ Peripheral Pulses, No clubbing, cyanosis, or edema, no calf tenderness in either leg    Diagnostic Testing:      Ca    8.5        25 May 2021 07:46

## 2021-05-26 NOTE — PROGRESS NOTE ADULT - SUBJECTIVE AND OBJECTIVE BOX
PULMONARY/CRITICAL CARE    DOS 5/26/21  Still some mild abdominal pain. Ambulated. Used cpap.     Patient is a 40y old  Male who presents with a chief complaint of s/p gastric sleeve (24 May 2021 16:13)    BRIEF HOSPITAL COURSE: ***    Events last 24 hours: ***    PAST MEDICAL & SURGICAL HISTORY:  Type 2 diabetes mellitus    Sleep apnea  CPAP 11    COVID-19 vaccine series completed  5/11/2021    Seasonal allergies    Severe obesity (BMI &gt;= 40)  BMI 59    Class 3 severe obesity with body mass index (BMI) of 50.0 to 59.9 in adult    No significant past surgical history  No VTE    Allergies    No Known Allergies    Intolerances      FAMILY HISTORY/ social: no cigs; no recent etoh; security  Family history unknown          Medications:        acetaminophen  IVPB .. 1000 milliGRAM(s) IV Intermittent once  acetaminophen  IVPB .. 1000 milliGRAM(s) IV Intermittent every 6 hours  HYDROmorphone  Injectable 0.5 milliGRAM(s) IV Push every 10 minutes PRN  ibuprofen IVPB .. 800 milliGRAM(s) IV Intermittent every 6 hours PRN  ondansetron Injectable 4 milliGRAM(s) IV Push once PRN              lactated ringers. 1000 milliLiter(s) IV Continuous <Continuous>                ICU Vital Signs Last 24 Hrs  T(C): 36.9 (24 May 2021 08:54), Max: 36.9 (24 May 2021 08:54)  T(F): 98.4 (24 May 2021 08:54), Max: 98.4 (24 May 2021 08:54)  HR: 589 (24 May 2021 17:00) (58 - 589)  BP: 126/71 (24 May 2021 16:45) (95/45 - 143/77)  BP(mean): --  ABP: --  ABP(mean): --  RR: 11 (24 May 2021 16:45) (11 - 18)  SpO2: 100% (24 May 2021 16:45) (94% - 100%)    Vital Signs Last 24 Hrs  T(C): 36.9 (24 May 2021 08:54), Max: 36.9 (24 May 2021 08:54)  T(F): 98.4 (24 May 2021 08:54), Max: 98.4 (24 May 2021 08:54)  HR: 589 (24 May 2021 17:00) (58 - 589)  BP: 126/71 (24 May 2021 16:45) (95/45 - 143/77)  BP(mean): --  RR: 11 (24 May 2021 16:45) (11 - 18)  SpO2: 100% (24 May 2021 16:45) (94% - 100%)        I&O's Detail        LABS:                CAPILLARY BLOOD GLUCOSE      POCT Blood Glucose.: 84 mg/dL (24 May 2021 08:59)        CULTURES:      Physical Examination:    General: No acute distress.   AA male    HEENT: Pupils equal, reactive to light.  Symmetric.    PULM: Clear to auscultation bilaterally, no significant sputum production    CVS: Regular rate and rhythm, no murmurs, rubs, or gallops    ABD: Soft, nondistended, mildly tender, decreased bowel sounds, no masses    EXT: No edema, nontender calves    SKIN: Warm and well perfused, no rashes noted.    NEURO: Alert, oriented, interactive, nonfocal    RADIOLOGY: ***    CRITICAL CARE TIME SPENT: ***

## 2021-05-26 NOTE — PROGRESS NOTE ADULT - ASSESSMENT
39 yo Male POD #2 s/p Laparoscopic sleeve gastrectomy with laparoscopic repair of hiatal hernia and intraoperative EGD is doing well and hemodynamically stable. Continue to monitor UO.

## 2021-05-26 NOTE — PROGRESS NOTE ADULT - SUBJECTIVE AND OBJECTIVE BOX
Pre-Op Dx: Morbid obesity  Procedure: Laparoscopic sleeve gastrectomy with laparoscopic repair of hiatal hernia and intraoperative EGD  Surgeon: Jackeline    HPI:   39 yo Male s/p Laparoscopic sleeve gastrectomy with laparoscopic repair of hiatal hernia and intraoperative EGD POD # 2. Ambulating on the floor and utilizing incentive spirometry. Tolerating clear liquid diet. Urine concentrated yesterday and received multiple IV fluid bolus. Minimal incisional discomfort. No nausea or vomiting. Pt seen and examined at the bedside.     VITALS:  Vital Signs Last 24 Hrs  T(C): 37.1 (26 May 2021 03:11), Max: 37.1 (26 May 2021 03:11)  T(F): 98.8 (26 May 2021 03:11), Max: 98.8 (26 May 2021 03:11)  HR: 64 (26 May 2021 03:18) (55 - 85)  BP: 134/72 (26 May 2021 03:11) (125/68 - 138/83)  BP(mean): --  RR: 16 (26 May 2021 03:11) (16 - 16)  SpO2: 94% (26 May 2021 03:18) (92% - 97%)    05-25 @ 07:01  -  05-26 @ 07:00  --------------------------------------------------------  IN: 6140 mL / OUT: 1650 mL / NET: 4490 mL      CAPILLARY BLOOD GLUCOSE      POCT Blood Glucose.: 85 mg/dL (26 May 2021 07:51)  POCT Blood Glucose.: 84 mg/dL (25 May 2021 21:23)  POCT Blood Glucose.: 82 mg/dL (25 May 2021 17:07)  POCT Blood Glucose.: 90 mg/dL (25 May 2021 12:04)        Physical Exam:  General: A/O x 3, NAD, resting comfortably in bed  Abdominal: soft, ND, nontender to palpation, incisions C/D/I  Extremities: no edema, no calf tenderness B/L

## 2021-05-27 ENCOUNTER — TRANSCRIPTION ENCOUNTER (OUTPATIENT)
Age: 41
End: 2021-05-27

## 2021-05-27 VITALS
DIASTOLIC BLOOD PRESSURE: 83 MMHG | HEART RATE: 78 BPM | SYSTOLIC BLOOD PRESSURE: 141 MMHG | TEMPERATURE: 98 F | OXYGEN SATURATION: 92 % | RESPIRATION RATE: 18 BRPM

## 2021-05-27 LAB
ANION GAP SERPL CALC-SCNC: 8 MMOL/L — SIGNIFICANT CHANGE UP (ref 5–17)
BUN SERPL-MCNC: 9 MG/DL — SIGNIFICANT CHANGE UP (ref 7–23)
CALCIUM SERPL-MCNC: 8.5 MG/DL — SIGNIFICANT CHANGE UP (ref 8.4–10.5)
CHLORIDE SERPL-SCNC: 104 MMOL/L — SIGNIFICANT CHANGE UP (ref 96–108)
CO2 SERPL-SCNC: 29 MMOL/L — SIGNIFICANT CHANGE UP (ref 22–31)
CREAT SERPL-MCNC: 0.99 MG/DL — SIGNIFICANT CHANGE UP (ref 0.5–1.3)
GLUCOSE BLDC GLUCOMTR-MCNC: 79 MG/DL — SIGNIFICANT CHANGE UP (ref 70–99)
GLUCOSE BLDC GLUCOMTR-MCNC: 80 MG/DL — SIGNIFICANT CHANGE UP (ref 70–99)
GLUCOSE SERPL-MCNC: 77 MG/DL — SIGNIFICANT CHANGE UP (ref 70–99)
HCT VFR BLD CALC: 36.7 % — LOW (ref 39–50)
HGB BLD-MCNC: 11.6 G/DL — LOW (ref 13–17)
MCHC RBC-ENTMCNC: 25.3 PG — LOW (ref 27–34)
MCHC RBC-ENTMCNC: 31.6 GM/DL — LOW (ref 32–36)
MCV RBC AUTO: 80.1 FL — SIGNIFICANT CHANGE UP (ref 80–100)
NRBC # BLD: 0 /100 WBCS — SIGNIFICANT CHANGE UP (ref 0–0)
PLATELET # BLD AUTO: 203 K/UL — SIGNIFICANT CHANGE UP (ref 150–400)
POTASSIUM SERPL-MCNC: 3.4 MMOL/L — LOW (ref 3.5–5.3)
POTASSIUM SERPL-SCNC: 3.4 MMOL/L — LOW (ref 3.5–5.3)
RBC # BLD: 4.58 M/UL — SIGNIFICANT CHANGE UP (ref 4.2–5.8)
RBC # FLD: 14.7 % — HIGH (ref 10.3–14.5)
SODIUM SERPL-SCNC: 141 MMOL/L — SIGNIFICANT CHANGE UP (ref 135–145)
WBC # BLD: 9.45 K/UL — SIGNIFICANT CHANGE UP (ref 3.8–10.5)
WBC # FLD AUTO: 9.45 K/UL — SIGNIFICANT CHANGE UP (ref 3.8–10.5)

## 2021-05-27 PROCEDURE — 97161 PT EVAL LOW COMPLEX 20 MIN: CPT

## 2021-05-27 PROCEDURE — 80053 COMPREHEN METABOLIC PANEL: CPT

## 2021-05-27 PROCEDURE — 99231 SBSQ HOSP IP/OBS SF/LOW 25: CPT

## 2021-05-27 PROCEDURE — 94660 CPAP INITIATION&MGMT: CPT

## 2021-05-27 PROCEDURE — 94664 DEMO&/EVAL PT USE INHALER: CPT

## 2021-05-27 PROCEDURE — 88307 TISSUE EXAM BY PATHOLOGIST: CPT

## 2021-05-27 PROCEDURE — 36415 COLL VENOUS BLD VENIPUNCTURE: CPT

## 2021-05-27 PROCEDURE — C1889: CPT

## 2021-05-27 PROCEDURE — 85027 COMPLETE CBC AUTOMATED: CPT

## 2021-05-27 PROCEDURE — 80048 BASIC METABOLIC PNL TOTAL CA: CPT

## 2021-05-27 PROCEDURE — 82962 GLUCOSE BLOOD TEST: CPT

## 2021-05-27 RX ORDER — ENOXAPARIN SODIUM 100 MG/ML
40 INJECTION SUBCUTANEOUS
Qty: 28 | Refills: 0
Start: 2021-05-27 | End: 2021-06-09

## 2021-05-27 RX ADMIN — ENOXAPARIN SODIUM 40 MILLIGRAM(S): 100 INJECTION SUBCUTANEOUS at 05:56

## 2021-05-27 RX ADMIN — Medication 1000 MILLIGRAM(S): at 01:36

## 2021-05-27 RX ADMIN — Medication 400 MILLIGRAM(S): at 01:16

## 2021-05-27 RX ADMIN — Medication 1000 MILLIGRAM(S): at 08:42

## 2021-05-27 RX ADMIN — SODIUM CHLORIDE 150 MILLILITER(S): 9 INJECTION, SOLUTION INTRAVENOUS at 12:00

## 2021-05-27 RX ADMIN — Medication 400 MILLIGRAM(S): at 08:31

## 2021-05-27 RX ADMIN — ONDANSETRON 4 MILLIGRAM(S): 8 TABLET, FILM COATED ORAL at 12:46

## 2021-05-27 RX ADMIN — ONDANSETRON 4 MILLIGRAM(S): 8 TABLET, FILM COATED ORAL at 05:56

## 2021-05-27 RX ADMIN — PANTOPRAZOLE SODIUM 40 MILLIGRAM(S): 20 TABLET, DELAYED RELEASE ORAL at 12:46

## 2021-05-27 NOTE — PROGRESS NOTE ADULT - ASSESSMENT
39 yo Male POD #3  s/p Laparoscopic sleeve gastrectomy with laparoscopic repair of hiatal hernia and intraoperative EGD is doing well and hemodynamically stable. Continue to monitor urine output / fluid intake. Pending Physical Therapy Evaluation prior to discharge. Educated on Lovenox SQ injections at home.

## 2021-05-27 NOTE — PROGRESS NOTE ADULT - ASSESSMENT
41 yo m pmh DM2 is s/p gastric sleeve    #s/p Gastric Sleeve  - Pain control  - Bowel regimen  - PT/OT  - Incentive spirometry  - advance diet per surgery - starting protein shakes today  - VTE PPx - Lovenox    #DM2  low dose sliding scale  upon discharge - hold metformin and trulicity, f/u with obesity medicine for when to resume    #MADELYN  cpap

## 2021-05-27 NOTE — PROGRESS NOTE ADULT - SUBJECTIVE AND OBJECTIVE BOX
PULMONARY/CRITICAL CARE    DOS 5/27/21  Much improved, less  abdominal pain. Ambulated. Used cpap.     Patient is a 40y old  Male who presents with a chief complaint of s/p gastric sleeve (24 May 2021 16:13)    BRIEF HOSPITAL COURSE: ***    Events last 24 hours: ***    PAST MEDICAL & SURGICAL HISTORY:  Type 2 diabetes mellitus    Sleep apnea  CPAP 11    COVID-19 vaccine series completed  5/11/2021    Seasonal allergies    Severe obesity (BMI &gt;= 40)  BMI 59    Class 3 severe obesity with body mass index (BMI) of 50.0 to 59.9 in adult    No significant past surgical history  No VTE    Allergies    No Known Allergies    Intolerances      FAMILY HISTORY/ social: no cigs; no recent etoh; security  Family history unknown          Medications:        acetaminophen  IVPB .. 1000 milliGRAM(s) IV Intermittent once  acetaminophen  IVPB .. 1000 milliGRAM(s) IV Intermittent every 6 hours  HYDROmorphone  Injectable 0.5 milliGRAM(s) IV Push every 10 minutes PRN  ibuprofen IVPB .. 800 milliGRAM(s) IV Intermittent every 6 hours PRN  ondansetron Injectable 4 milliGRAM(s) IV Push once PRN              lactated ringers. 1000 milliLiter(s) IV Continuous <Continuous>                ICU Vital Signs Last 24 Hrs  T(C): 36.9 (24 May 2021 08:54), Max: 36.9 (24 May 2021 08:54)  T(F): 98.4 (24 May 2021 08:54), Max: 98.4 (24 May 2021 08:54)  HR: 589 (24 May 2021 17:00) (58 - 589)  BP: 126/71 (24 May 2021 16:45) (95/45 - 143/77)  BP(mean): --  ABP: --  ABP(mean): --  RR: 11 (24 May 2021 16:45) (11 - 18)  SpO2: 100% (24 May 2021 16:45) (94% - 100%)    Vital Signs Last 24 Hrs  T(C): 36.9 (24 May 2021 08:54), Max: 36.9 (24 May 2021 08:54)  T(F): 98.4 (24 May 2021 08:54), Max: 98.4 (24 May 2021 08:54)  HR: 589 (24 May 2021 17:00) (58 - 589)  BP: 126/71 (24 May 2021 16:45) (95/45 - 143/77)  BP(mean): --  RR: 11 (24 May 2021 16:45) (11 - 18)  SpO2: 100% (24 May 2021 16:45) (94% - 100%)        I&O's Detail        LABS:                CAPILLARY BLOOD GLUCOSE      POCT Blood Glucose.: 84 mg/dL (24 May 2021 08:59)        CULTURES:      Physical Examination:    General: No acute distress.   AA male    HEENT: Pupils equal, reactive to light.  Symmetric.    PULM: Clear to auscultation bilaterally, no significant sputum production    CVS: Regular rate and rhythm, no murmurs, rubs, or gallops    ABD: Soft, nondistended, mildly tender, decreased bowel sounds, no masses    EXT: No edema, nontender calves    SKIN: Warm and well perfused, no rashes noted.    NEURO: Alert, oriented, interactive, nonfocal    RADIOLOGY: ***    CRITICAL CARE TIME SPENT: ***

## 2021-05-27 NOTE — PHYSICAL THERAPY INITIAL EVALUATION ADULT - ACTIVE RANGE OF MOTION EXAMINATION, REHAB EVAL
maldonado. upper extremity Active ROM was WNL (within normal limits)/bilateral  lower extremity Active ROM was WFL (within functional limits)

## 2021-05-27 NOTE — DISCHARGE NOTE NURSING/CASE MANAGEMENT/SOCIAL WORK - PATIENT PORTAL LINK FT
You can access the FollowMyHealth Patient Portal offered by [facility] by registering at the following website: [email or web address]. By joining [facility]’s FollowMyHealth portal, you will also be able to view your health information using other applications (apps) compatible with our system.

## 2021-05-27 NOTE — PROGRESS NOTE ADULT - ATTENDING COMMENTS
Agree with above.  Patient seen and examined.  Patient did not feel comfortable going home yesterday having difficulty getting up from chair and out of bed.  Much more comfortable today. Tolerating clears, good urine output, ambulating.  Probable discharge home later today.  Follow up in the office next week, call with any concerns. Will have PT work with him prior to discharge. Discharge on lovenox.
Agree with above.  Patient seen and examined.  Tolerating clears, good urine output, ambulating.  Discharge home later today.  Follow up in the office next week, call with any concerns.  Will send home on lovenox.
Agree with above.  Patient seen and examined.  Tolerating clears, good urine output, ambulating.  Probable discharge home later today.  Follow up in the office next week, call with any concerns.

## 2021-05-27 NOTE — PROGRESS NOTE ADULT - PROVIDER SPECIALTY LIST ADULT
Bariatric Surgery
Hospitalist
Pulmonology
Pulmonology
Hospitalist
Hospitalist
Pulmonology
Bariatric Surgery
Bariatric Surgery

## 2021-05-27 NOTE — PROGRESS NOTE ADULT - PROBLEM SELECTOR PROBLEM 2
Type 2 diabetes mellitus
S/P laparoscopic sleeve gastrectomy
Type 2 diabetes mellitus
S/P laparoscopic sleeve gastrectomy
Type 2 diabetes mellitus
S/P laparoscopic sleeve gastrectomy

## 2021-05-27 NOTE — PROGRESS NOTE ADULT - PROBLEM SELECTOR PROBLEM 3
Hiatal hernia
Severe obesity (BMI >= 40)
Severe obesity (BMI >= 40)
Hiatal hernia
Severe obesity (BMI >= 40)
Hiatal hernia

## 2021-05-27 NOTE — PROGRESS NOTE ADULT - REASON FOR ADMISSION
Patient is a 40y old  Male who presents with a chief complaint of 41 yo F with PMHx of morbid obesity admitted to [facility] for scheduled laparoscopic sleeve gastrectomy and intra-operative EGD and hiatal hernia repair.  (25 May 2021 08:50)

## 2021-05-27 NOTE — PROGRESS NOTE ADULT - SUBJECTIVE AND OBJECTIVE BOX
INTERVAL HPI/OVERNIGHT EVENTS:   Patient seen and examined.  Tolerating clears, voiding, +flatus.  No fevers, chills, sweats, dizziness, HA, changes in vision, cp, palpitations, sob, persistent cough, n/v/d, dysuria, focal weakness, or calf pain.      REVIEW OF SYSTEMS:  See HPI,  all others negative    PHYSICAL EXAM:  Vital Signs Last 24 Hrs  T(C): 36.4 (27 May 2021 07:46), Max: 37.1 (26 May 2021 11:36)  T(F): 97.6 (27 May 2021 07:46), Max: 98.8 (26 May 2021 11:36)  HR: 63 (27 May 2021 07:46) (58 - 66)  BP: 138/82 (27 May 2021 07:46) (130/82 - 141/68)  BP(mean): --  RR: 16 (27 May 2021 07:46) (15 - 17)  SpO2: 97% (27 May 2021 07:46) (94% - 100%)    GENERAL: NAD, well-groomed, well-developed, awake, alert, oriented x 3, fluent and coherent speech  EYES: EOMI, PERRLA, conjunctiva and sclera clear  ENMT: No tonsillar erythema, exudates, or enlargement; Moist mucous membranes,   No lesions seen on oral mucosa  NECK: Supple, No JVD, No Cervical LAD, No thyromegaly, No thyroid nodules felt  CHEST/LUNG: Clear to auscultation bilaterally with good air entry; No rales, rhonchi, wheezing, or rubs  HEART: Regular rate and rhythm; No murmurs, rubs, or gallops  ABDOMEN: Soft, tender near incision sites, obese, hypoactive bowel sounds present, No palpable masses or organomegaly, No bruits  EXTREMITIES:  2+ Peripheral Pulses, No clubbing, cyanosis, or edema, no calf tenderness in either leg    Diagnostic Testin.6   9.45  )-----------( 203      ( 27 May 2021 08:29 )             36.7         141  |  104  |  9   ----------------------------<  77  3.4<L>   |  29  |  0.99    Ca    8.5      27 May 2021 08:29

## 2021-05-27 NOTE — PROGRESS NOTE ADULT - PROBLEM SELECTOR PLAN 1
Cont GI / DVT prophylaxis.   Cont  OOB / ambulation on floor / incentive spirometry.  Cont bariatric clear diet as tolerated .  Discussed and reviewed home meds and pain medication with patient . Discussed medication that requires crushing.  Plan to start Lovenox 40 mg SQ injection BID x 14 days. - received nursing education.  Plan to be evaluated by Physical Therapy prior to being discharged .  Plan to begin protein shakes at home.  Plan to be discharged later today.
Cont GI / DVT prophylaxis.   Cont  OOB / ambulation on floor / incentive spirometry.  Cont bariatric clear diet as tolerated .  Dietician consult.   Discussed and reviewed home meds and pain medication with patient. Discussed medication that requires crushing.  Plan to begin protein shakes at home.  Possibly discharged later today or tomorrow.
Cont GI / DVT prophylaxis.   Cont  OOB / ambulation on floor / incentive spirometry.  Cont bariatric clear diet as tolerated .  Discussed and reviewed home meds and pain medication with patient . Discussed medication that requires crushing.  Plan to begin protein shakes at home.  Possibly discharged later today.

## 2021-05-27 NOTE — PHYSICAL THERAPY INITIAL EVALUATION ADULT - ADDITIONAL COMMENTS
Pt lives in house with 2 small  steps to enter with handrail, 2  steps inside with handrail.  Pt's family will assist at home upon d/c.

## 2021-05-27 NOTE — PROGRESS NOTE ADULT - PROBLEM SELECTOR PLAN 3
Cont GI / DVT prophylaxis.   Cont  OOB / ambulation on floor / incentive spirometry.

## 2021-05-27 NOTE — PROGRESS NOTE ADULT - SUBJECTIVE AND OBJECTIVE BOX
Pre-Op Dx: Morbid obesity  Procedure: Laparoscopic sleeve gastrectomy with laparoscopic repair of hiatal hernia and intraoperative EGD  Surgeon: Jackeline    HPI:   41 yo Male s/p Laparoscopic sleeve gastrectomy with laparoscopic repair of hiatal hernia and intraoperative EGD POD # 3. Ambulating on the floor and utilizing incentive spirometry. Tolerating clear liquid diet. Pt received multiple IV fluid bolus during post operative period due to concentrated urine . Pt states urine is light colored.  Minimal incisional discomfort/ significant improvement since yesterday. No nausea or vomiting. Pt seen and examined at the bedside. Pt ambulating without any issues / some difficulty with transfer training - supine to sit  / sit stand from bed.    ICU Vital Signs Last 24 Hrs  T(C): 36.4 (27 May 2021 07:46), Max: 37.1 (26 May 2021 11:36)  T(F): 97.6 (27 May 2021 07:46), Max: 98.8 (26 May 2021 11:36)  HR: 63 (27 May 2021 07:46) (58 - 66)  BP: 138/82 (27 May 2021 07:46) (130/82 - 141/68)  BP(mean): --  ABP: --  ABP(mean): --  RR: 16 (27 May 2021 07:46) (15 - 17)  SpO2: 97% (27 May 2021 07:46) (94% - 100%)        Physical Exam:  General: A/O x 3, NAD, resting comfortably in bed  Abdominal: soft, ND, nontender to palpation, incisions C/D/I  Extremities: no edema, no calf tenderness B/L

## 2021-05-28 ENCOUNTER — NON-APPOINTMENT (OUTPATIENT)
Age: 41
End: 2021-05-28

## 2021-05-28 LAB
APTT BLD: 40.2 SEC
INR PPP: 1.09 RATIO
PT BLD: 12.8 SEC

## 2021-06-01 ENCOUNTER — APPOINTMENT (OUTPATIENT)
Dept: BARIATRICS | Facility: CLINIC | Age: 41
End: 2021-06-01
Payer: COMMERCIAL

## 2021-06-01 ENCOUNTER — OUTPATIENT (OUTPATIENT)
Dept: OUTPATIENT SERVICES | Facility: HOSPITAL | Age: 41
LOS: 1 days | End: 2021-06-01
Payer: COMMERCIAL

## 2021-06-01 VITALS
OXYGEN SATURATION: 97 % | DIASTOLIC BLOOD PRESSURE: 80 MMHG | SYSTOLIC BLOOD PRESSURE: 140 MMHG | HEIGHT: 76 IN | BODY MASS INDEX: 38.36 KG/M2 | TEMPERATURE: 97.1 F | HEART RATE: 99 BPM | WEIGHT: 315 LBS

## 2021-06-01 DIAGNOSIS — M79.89 OTHER SPECIFIED SOFT TISSUE DISORDERS: ICD-10-CM

## 2021-06-01 PROBLEM — G47.30 SLEEP APNEA, UNSPECIFIED: Chronic | Status: ACTIVE | Noted: 2021-05-11

## 2021-06-01 PROBLEM — E11.9 TYPE 2 DIABETES MELLITUS WITHOUT COMPLICATIONS: Chronic | Status: ACTIVE | Noted: 2021-05-11

## 2021-06-01 PROBLEM — E66.01 MORBID (SEVERE) OBESITY DUE TO EXCESS CALORIES: Chronic | Status: ACTIVE | Noted: 2021-05-11

## 2021-06-01 PROBLEM — Z92.29 PERSONAL HISTORY OF OTHER DRUG THERAPY: Chronic | Status: ACTIVE | Noted: 2021-05-11

## 2021-06-01 PROBLEM — J30.2 OTHER SEASONAL ALLERGIC RHINITIS: Chronic | Status: ACTIVE | Noted: 2021-05-11

## 2021-06-01 PROCEDURE — 99024 POSTOP FOLLOW-UP VISIT: CPT

## 2021-06-01 PROCEDURE — 93970 EXTREMITY STUDY: CPT | Mod: 26

## 2021-06-01 PROCEDURE — 93970 EXTREMITY STUDY: CPT

## 2021-06-01 NOTE — REVIEW OF SYSTEMS
[Negative] : Allergic/Immunologic [Lower Ext Edema] : lower extremity edema [FreeTextEntry7] : incisional pain

## 2021-06-01 NOTE — HISTORY OF PRESENT ILLNESS
[Procedure: ___] : Procedure performed: [unfilled]  [Date of Surgery: ___] : Date of Surgery:   [unfilled] [Surgeon Name:   ___] : Surgeon Name: Dr. NAIDU [Pre-Op Weight ___] : Pre-op weight was [unfilled] lbs [de-identified] : 40 year old male one week status post laparoscopic sleeve gastrectomy and hiatal hernia repair. Pt is currently doing well. Patient is drinking water,  has good protein intake, and will start vitamins. Patient reports no reflux or constipation. Patient reports swelling both legs over weekend no pain and has now resolved. Constipation remedies were given to patient. Walking.  Patient taking lovenox.

## 2021-06-01 NOTE — PHYSICAL EXAM
[Normal] : affect appropriate [de-identified] : lower extremities no pain or swelling [de-identified] : obese, soft, nontender, no evidence of hernia, incisions healing well small amount of blood from two larger incisions.

## 2021-06-01 NOTE — ASSESSMENT
[FreeTextEntry1] : 40 year old man  one week status post laparoscopic sleeve gastrectomy and hiatal hernia repair currently doing well. Nutrition and exercise guidelines were reviewed with the patient.  Bilateral lower extremity edema like post operative fluid shifts as has now resolved but will evaluate for DVT.\par \par Bilateral venous duplex r/o DVT swelling noted over the weekend both legs\par Finish lovenox\par Encourage fluid intake\par Protein focused diet, advance diet as scheduled\par Start vitamins\par Exercise with both cardio and strength training to be at 6 weeks postop\par Follow nutritionist and nutrition classes\par Followup in 1 month\par Call with any questions or concerns

## 2021-06-23 RX ORDER — BUPROPION HYDROCHLORIDE 150 MG/1
150 TABLET, EXTENDED RELEASE ORAL
Qty: 180 | Refills: 1 | Status: DISCONTINUED | COMMUNITY
Start: 2020-07-22 | End: 2021-06-23

## 2021-06-23 RX ORDER — DULAGLUTIDE 3 MG/.5ML
3 INJECTION, SOLUTION SUBCUTANEOUS
Qty: 4 | Refills: 5 | Status: DISCONTINUED | COMMUNITY
Start: 2018-10-31 | End: 2021-06-23

## 2021-06-24 ENCOUNTER — APPOINTMENT (OUTPATIENT)
Dept: BARIATRICS | Facility: CLINIC | Age: 41
End: 2021-06-24
Payer: COMMERCIAL

## 2021-06-24 ENCOUNTER — NON-APPOINTMENT (OUTPATIENT)
Age: 41
End: 2021-06-24

## 2021-06-24 VITALS
DIASTOLIC BLOOD PRESSURE: 80 MMHG | HEART RATE: 100 BPM | OXYGEN SATURATION: 96 % | SYSTOLIC BLOOD PRESSURE: 120 MMHG | BODY MASS INDEX: 38.36 KG/M2 | TEMPERATURE: 96.4 F | HEIGHT: 76 IN | WEIGHT: 315 LBS

## 2021-06-24 PROCEDURE — 99024 POSTOP FOLLOW-UP VISIT: CPT

## 2021-06-24 RX ORDER — ONDANSETRON 4 MG/1
4 TABLET, ORALLY DISINTEGRATING ORAL 4 TIMES DAILY
Qty: 15 | Refills: 0 | Status: COMPLETED | COMMUNITY
Start: 2021-05-17 | End: 2021-06-24

## 2021-06-24 RX ORDER — METFORMIN ER 750 MG 750 MG/1
750 TABLET ORAL
Qty: 180 | Refills: 3 | Status: COMPLETED | COMMUNITY
Start: 2017-02-25 | End: 2021-06-24

## 2021-06-24 RX ORDER — ERGOCALCIFEROL 1.25 MG/1
1.25 MG CAPSULE, LIQUID FILLED ORAL
Qty: 12 | Refills: 3 | Status: COMPLETED | COMMUNITY
Start: 2017-02-25 | End: 2021-06-24

## 2021-06-24 RX ORDER — OXYCODONE 5 MG/1
5 TABLET ORAL
Qty: 6 | Refills: 0 | Status: COMPLETED | COMMUNITY
Start: 2021-05-17 | End: 2021-06-24

## 2021-06-24 NOTE — ASSESSMENT
[FreeTextEntry1] : 40 year old M 1 month s/p lap sleeve gastrectomy and hiatal hernia repair. 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

## 2021-06-24 NOTE — REVIEW OF SYSTEMS
[Recent Change In Weight] : ~T recent weight change [Constipation] : constipation [Negative] : Heme/Lymph [Fever] : no fever [Chills] : no chills [Night Sweats] : no night sweats [Fatigue] : no fatigue [Lower Ext Edema] : no lower extremity edema [Abdominal Pain] : no abdominal pain [Vomiting] : no vomiting [Diarrhea] : no diarrhea [Reflux/Heartburn] : no reflex/heartburn [Hernia] : no hernia [FreeTextEntry2] : intentional weight loss

## 2021-06-24 NOTE — HISTORY OF PRESENT ILLNESS
[Procedure: ___] : Procedure performed: [unfilled]  [Date of Surgery: ___] : Date of Surgery:   [unfilled] [Surgeon Name:   ___] : Surgeon Name: Dr. NAIDU [Pre-Op Weight ___] : Pre-op weight was [unfilled] lbs [de-identified] : 40 year old M 1 month s/p lap sleeve gastrectomy and hiatal hernia repair. Weight loss since last visit. Tolerating advancement of diet to soft foods. Consuming a sufficient amount of protein and liquids daily. Based on diet, might be having over 80 grams of protein because continues to have two protein shakes daily. Taking MVI as directed and omeprazole daily. Complains of straining with BM. No reflux symptoms, nausea, vomiting and diarrhea. Ambulating frequently for exercise. Will be traveling to [place] in 2 weeks.

## 2021-06-24 NOTE — REASON FOR VISIT
[Follow-Up Visit] : a follow-up visit for [Obesity] : obesity [Obstructive Sleep [place]] : obstructive sleep apena Additional Progress Note...

## 2021-06-24 NOTE — PHYSICAL EXAM
[Obese] : obese [Normal] : affect appropriate [de-identified] : EOMI, anicteric [de-identified] : EOMI, anicteric  [de-identified] : obese, soft, nontender, no evidence of hernia, incisions well healed

## 2021-06-29 ENCOUNTER — RX RENEWAL (OUTPATIENT)
Age: 41
End: 2021-06-29

## 2021-07-01 ENCOUNTER — APPOINTMENT (OUTPATIENT)
Dept: BARIATRICS/WEIGHT MGMT | Facility: CLINIC | Age: 41
End: 2021-07-01
Payer: COMMERCIAL

## 2021-07-01 VITALS — WEIGHT: 315 LBS | HEIGHT: 76 IN | BODY MASS INDEX: 38.36 KG/M2

## 2021-07-01 PROCEDURE — 99214 OFFICE O/P EST MOD 30 MIN: CPT | Mod: 95

## 2021-07-01 NOTE — HISTORY OF PRESENT ILLNESS
[FreeTextEntry1] : This is a 40 year old male  present for obesity followup visit. PMH: obesity, dm, s/p gastric sleeve 5/2021\par \par Patient is about 1 month out from bariatric surgery. He is down to 456 pounds\par Feeling very well. \par He is making sure he "does what he needs to do" when it comes to his food getting in what he needs. He speaks to RD regularly \par Patient was cleared to exercise. He went into his pool this week, plans to rejoin gym. \par He has been off all medication previously prescribed\par \par He is going to [place] next week

## 2021-07-01 NOTE — ASSESSMENT
[FreeTextEntry1] : Plan: \par \par continue to f/u with RD and surgery team\par will f/u with me periodically to touch base\par no medications \par focus on adding daily exercise \par \par f/u 6 weeks

## 2021-07-21 ENCOUNTER — APPOINTMENT (OUTPATIENT)
Dept: BARIATRICS/WEIGHT MGMT | Facility: CLINIC | Age: 41
End: 2021-07-21
Payer: COMMERCIAL

## 2021-07-21 VITALS — WEIGHT: 315 LBS | BODY MASS INDEX: 38.36 KG/M2 | HEIGHT: 76 IN

## 2021-07-21 PROCEDURE — 97803 MED NUTRITION INDIV SUBSEQ: CPT | Mod: 95

## 2021-07-27 ENCOUNTER — APPOINTMENT (OUTPATIENT)
Dept: BARIATRICS | Facility: CLINIC | Age: 41
End: 2021-07-27
Payer: COMMERCIAL

## 2021-07-27 VITALS
DIASTOLIC BLOOD PRESSURE: 78 MMHG | HEIGHT: 76 IN | WEIGHT: 315 LBS | HEART RATE: 75 BPM | OXYGEN SATURATION: 98 % | SYSTOLIC BLOOD PRESSURE: 120 MMHG | TEMPERATURE: 97.2 F | BODY MASS INDEX: 38.36 KG/M2

## 2021-07-27 PROCEDURE — 99024 POSTOP FOLLOW-UP VISIT: CPT

## 2021-07-27 RX ORDER — OMEPRAZOLE 20 MG/1
20 CAPSULE, DELAYED RELEASE ORAL DAILY
Qty: 30 | Refills: 1 | Status: COMPLETED | COMMUNITY
Start: 2021-05-17 | End: 2021-07-27

## 2021-08-04 ENCOUNTER — APPOINTMENT (OUTPATIENT)
Dept: PULMONOLOGY | Facility: CLINIC | Age: 41
End: 2021-08-04
Payer: COMMERCIAL

## 2021-08-04 VITALS
RESPIRATION RATE: 14 BRPM | BODY MASS INDEX: 55.38 KG/M2 | HEART RATE: 79 BPM | OXYGEN SATURATION: 98 % | WEIGHT: 315 LBS | DIASTOLIC BLOOD PRESSURE: 78 MMHG | SYSTOLIC BLOOD PRESSURE: 128 MMHG

## 2021-08-04 PROCEDURE — 99214 OFFICE O/P EST MOD 30 MIN: CPT

## 2021-08-05 NOTE — REVIEW OF SYSTEMS
[Recent Change In Weight] : ~T recent weight change [Negative] : Heme/Lymph [Fever] : no fever [Chills] : no chills [Night Sweats] : no night sweats [Fatigue] : no fatigue [Lower Ext Edema] : no lower extremity edema [Abdominal Pain] : no abdominal pain [Vomiting] : no vomiting [Constipation] : no constipation [Diarrhea] : no diarrhea [Reflux/Heartburn] : no reflex/heartburn [Hernia] : no hernia [FreeTextEntry2] : intentional weight loss

## 2021-08-05 NOTE — PHYSICAL EXAM
[Obese] : obese [Normal] : affect appropriate [de-identified] : EOMI, anicteric  [de-identified] : obese, soft, nontender, no evidence of hernia, incisions well healed

## 2021-08-05 NOTE — HISTORY OF PRESENT ILLNESS
[Procedure: ___] : Procedure performed: [unfilled]  [Date of Surgery: ___] : Date of Surgery:   [unfilled] [Surgeon Name:   ___] : Surgeon Name: Dr. NAIDU [Pre-Op Weight ___] : Pre-op weight was [unfilled] lbs [de-identified] : 41 yo M two months s/p laparoscopic sleeve gastrectomy and hiatal hernia repair. Lost 6 lbs since last visit. Saw nutritionist last week. Making efforts to consume the appropriate amount of protein and water daily - some days adds protein shake as directed by nutritionist. Tolerating advancement of diet to regular foods. Continues to see Obesity Medicine. Taking vitamins daily. No acid reflux symptoms, nausea, vomiting, diarrhea and constipation. For exercise, walking more frequently and swimming laps for 30-40 min 3-4 times a week.

## 2021-08-05 NOTE — ASSESSMENT
[FreeTextEntry1] : 39 yo M 2 months s/p laparoscopic sleeve gastrectomy and hiatal hernia repair. Doing well and continues to lose weight. Incisions healed appropriately. \par \par Continue a low fat, low carbohydrate and high protein diet - add 1 protein shake daily as directed by nutritionist \par Increase amount of walking to reach goal of 10,000 steps daily and incorporate strength exercises\par Switch vitamins to multivitamin capsules and calcium chew\par Follow up with nutritionist in September\par Follow up with Obesity Medicine next month\par Follow up in 1 month with LABS prior to visit\par Call with any questions or concerns\par

## 2021-08-12 ENCOUNTER — APPOINTMENT (OUTPATIENT)
Dept: BARIATRICS/WEIGHT MGMT | Facility: CLINIC | Age: 41
End: 2021-08-12
Payer: COMMERCIAL

## 2021-08-12 VITALS — HEIGHT: 76 IN | WEIGHT: 315 LBS | BODY MASS INDEX: 38.36 KG/M2

## 2021-08-12 PROCEDURE — 99214 OFFICE O/P EST MOD 30 MIN: CPT | Mod: 95

## 2021-08-12 NOTE — ASSESSMENT
[FreeTextEntry1] : BARIATRIC SURGERY HISTORY: gastric sleeve 5/2021\par OBESITY CO-MORBIDITIES: MADELYN, DM\par ANTI-OBESITY MEDICATIONS: none\par OBESITY MEDICATION SIDE EFFECTS: n/a \par  \par \par \par Plan: \par \par continue to f/u with RD and surgery team\par recommend he drink protein shake daily \par increase exercise. recommend he is in the pool 5 days a week in addition to walking. recommending getting an indoor exercise machine \par will hold off on medication for now, until he hits a true plateau- patient has been losing 1-2 pounds a week \par also concerned he is not getting adequate nutrition at this moment- will discuss with RD \par \par f/u 4 weeks. \par

## 2021-08-12 NOTE — HISTORY OF PRESENT ILLNESS
[FreeTextEntry1] : This is a 41 year old male  present for obesity followup visit. \par \par Patient is down another 2-3 pounds\par \par He recently saw pulm who ordered a new CPAP b/c his original one was recalled \par \par Patient continues to try to get in 3-4 meals a day (including a protein shake) \par He skips shake at times, typically getting in turkey/chicken as his protein sources , he had a hamburger last weekend \par Denies n/v\par sees RD  \par \par Patient states he is in his pool 2x/week for 1-2 hours. He will swim and do exercises \par or he will walk around the block once at night

## 2021-08-24 DIAGNOSIS — Z00.00 ENCOUNTER FOR GENERAL ADULT MEDICAL EXAMINATION W/OUT ABNORMAL FINDINGS: ICD-10-CM

## 2021-08-26 LAB
24R-OH-CALCIDIOL SERPL-MCNC: 69 PG/ML
ALBUMIN SERPL ELPH-MCNC: 3.9 G/DL
ALP BLD-CCNC: 72 U/L
ALT SERPL-CCNC: 19 U/L
ANION GAP SERPL CALC-SCNC: 15 MMOL/L
APPEARANCE: CLEAR
AST SERPL-CCNC: 16 U/L
BASOPHILS # BLD AUTO: 0.08 K/UL
BASOPHILS NFR BLD AUTO: 0.8 %
BILIRUB SERPL-MCNC: 0.3 MG/DL
BILIRUBIN URINE: NEGATIVE
BLOOD URINE: NEGATIVE
BUN SERPL-MCNC: 13 MG/DL
CALCIUM SERPL-MCNC: 9.3 MG/DL
CHLORIDE SERPL-SCNC: 101 MMOL/L
CHOLEST SERPL-MCNC: 165 MG/DL
CO2 SERPL-SCNC: 24 MMOL/L
COLOR: YELLOW
CREAT SERPL-MCNC: 0.85 MG/DL
EOSINOPHIL # BLD AUTO: 0.07 K/UL
EOSINOPHIL NFR BLD AUTO: 0.7 %
ERYTHROCYTE [SEDIMENTATION RATE] IN BLOOD BY WESTERGREN METHOD: 43 MM/HR
ESTIMATED AVERAGE GLUCOSE: 120 MG/DL
FOLATE SERPL-MCNC: >20 NG/ML
GLUCOSE QUALITATIVE U: NEGATIVE
GLUCOSE SERPL-MCNC: 81 MG/DL
HBA1C MFR BLD HPLC: 5.8 %
HCT VFR BLD CALC: 44.9 %
HDLC SERPL-MCNC: 39 MG/DL
HGB BLD-MCNC: 13.4 G/DL
IMM GRANULOCYTES NFR BLD AUTO: 0.2 %
KETONES URINE: NEGATIVE
LDLC SERPL CALC-MCNC: 110 MG/DL
LEUKOCYTE ESTERASE URINE: NEGATIVE
LYMPHOCYTES # BLD AUTO: 2.34 K/UL
LYMPHOCYTES NFR BLD AUTO: 23.3 %
MAN DIFF?: NORMAL
MCHC RBC-ENTMCNC: 25.2 PG
MCHC RBC-ENTMCNC: 29.8 GM/DL
MCV RBC AUTO: 84.4 FL
MONOCYTES # BLD AUTO: 0.72 K/UL
MONOCYTES NFR BLD AUTO: 7.2 %
NEUTROPHILS # BLD AUTO: 6.83 K/UL
NEUTROPHILS NFR BLD AUTO: 67.8 %
NITRITE URINE: NEGATIVE
NONHDLC SERPL-MCNC: 126 MG/DL
PH URINE: 6.5
PLATELET # BLD AUTO: 261 K/UL
POTASSIUM SERPL-SCNC: 4.4 MMOL/L
PROT SERPL-MCNC: 7.2 G/DL
PROTEIN URINE: NORMAL
RBC # BLD: 5.32 M/UL
RBC # FLD: 15.4 %
SODIUM SERPL-SCNC: 139 MMOL/L
SPECIFIC GRAVITY URINE: 1.03
TRIGL SERPL-MCNC: 79 MG/DL
TSH SERPL-ACNC: 1.76 UIU/ML
UROBILINOGEN URINE: NORMAL
VIT B12 SERPL-MCNC: 700 PG/ML
WBC # FLD AUTO: 10.06 K/UL

## 2021-08-31 ENCOUNTER — APPOINTMENT (OUTPATIENT)
Dept: BARIATRICS | Facility: CLINIC | Age: 41
End: 2021-08-31
Payer: COMMERCIAL

## 2021-08-31 VITALS
SYSTOLIC BLOOD PRESSURE: 130 MMHG | WEIGHT: 315 LBS | HEIGHT: 76 IN | TEMPERATURE: 97.3 F | BODY MASS INDEX: 38.36 KG/M2 | HEART RATE: 72 BPM | OXYGEN SATURATION: 98 % | DIASTOLIC BLOOD PRESSURE: 88 MMHG

## 2021-08-31 PROCEDURE — 99214 OFFICE O/P EST MOD 30 MIN: CPT

## 2021-08-31 RX ORDER — MULTIVIT-MIN/IRON/FOLIC ACID/K 45-800-120
CAPSULE ORAL DAILY
Refills: 0 | Status: ACTIVE | COMMUNITY

## 2021-09-01 ENCOUNTER — APPOINTMENT (OUTPATIENT)
Dept: BARIATRICS/WEIGHT MGMT | Facility: CLINIC | Age: 41
End: 2021-09-01

## 2021-09-02 NOTE — PHYSICAL EXAM
[Obese] : obese [Normal] : affect appropriate [de-identified] : EOMI, anicteric  [de-identified] : obese, soft, nontender, no evidence of hernia, incisions well healed

## 2021-09-02 NOTE — HISTORY OF PRESENT ILLNESS
[Procedure: ___] : Procedure performed: [unfilled]  [Date of Surgery: ___] : Date of Surgery:   [unfilled] [Surgeon Name:   ___] : Surgeon Name: Dr. NAIDU [Pre-Op Weight ___] : Pre-op weight was [unfilled] lbs [de-identified] : 42 yo male three months s/p laparoscopic sleeve gastrectomy and hiatal hernia repair. Lost 8 lbs since last visit. Reports decreased appetite and thus might not be having adequate protein daily. Having sufficient water daily. Tolerating advancement of diet to regular foods. Taking vitamins daily, but is noncompliant with calcium chews. No acid reflux symptoms, nausea, vomiting, diarrhea and constipation. For exercise, he is walking 7-10 K steps daily, laps in pool for 30 min about three times a week and UE strength exercises 10-20 min daily. Recently went for labs - but not complete set.

## 2021-09-02 NOTE — ASSESSMENT
[FreeTextEntry1] : 41 year old male 3 months s/p laparoscopic sleeve gastrectomy and hiatal hernia repair presents for follow up visit. Doing well and continues to lose weight. Reviewed lab results with the patient - Cholesterol levels and HgbA1c stable\par \par Low fat, low carbohydrate and high protein diet with three meals a day - increase protein intake;  \par Walk with goal of 10,000 steps daily and incorporate strength exercises including\par Nutritionist appointment changed to day in office\par Continue vitamins and take Calcium supplement regularly\par Will need additional labs drawn\par Follow up in 6 weeks\par Call with any questions or concerns\par

## 2021-09-14 ENCOUNTER — APPOINTMENT (OUTPATIENT)
Dept: BARIATRICS/WEIGHT MGMT | Facility: CLINIC | Age: 41
End: 2021-09-14
Payer: COMMERCIAL

## 2021-09-14 VITALS — HEIGHT: 76 IN | WEIGHT: 315 LBS | BODY MASS INDEX: 38.36 KG/M2

## 2021-09-14 PROCEDURE — 99213 OFFICE O/P EST LOW 20 MIN: CPT | Mod: 95

## 2021-09-14 NOTE — ASSESSMENT
[FreeTextEntry1] : BARIATRIC SURGERY HISTORY: gastric sleeve 5/2021\par OBESITY CO-MORBIDITIES: MADELYN, DM\par ANTI-OBESITY MEDICATIONS: none\par OBESITY MEDICATION SIDE EFFECTS: n/a \par  \par \par \par Plan: \par \par continue to f/u with RD and surgery team\par recommend he drink protein shake daily \par set an alarm for meal times \par return to daily physical activity. recommend he start walking outside since his pool will be closing \par will hold off on medication for now, until he hits a true plateau- patient has been losing 1-2 pounds a week \par his goal weight is 200-250 pounds \par will consider qysmia \par f/u with pulm and dentist for MADELYN treatment \par \par f/u 4 weeks. \par

## 2021-09-14 NOTE — HISTORY OF PRESENT ILLNESS
[Home] : at home, [unfilled] , at the time of the visit. [Other Location: e.g. Home (Enter Location, City,State)___] : at [unfilled] [Verbal consent obtained from patient] : the patient, [unfilled] [FreeTextEntry1] : This is a 41 year old male  present for obesity followup visit. \par \par Patient is down 6 pounds since last visit 1 month ago. \par \par Patient continues to try to get in 3-4 meals a day (including a protein shake) \par He skips lunch at times because he "forgets" to eat. \par sees RD  \par \par Patient is still waiting for his new CPAP to come in as it was recalled. He spoke with his dentist who discussed a dental apparatus. Patient is interested but it will all be out of pocket \par \par Patient pulled his lower back last week, so he has not be exercising. Reports improvement.  His pool will be closing this month \par \par Reviewed blood work

## 2021-10-11 ENCOUNTER — APPOINTMENT (OUTPATIENT)
Dept: BARIATRICS/WEIGHT MGMT | Facility: CLINIC | Age: 41
End: 2021-10-11
Payer: COMMERCIAL

## 2021-10-11 VITALS — WEIGHT: 315 LBS | HEIGHT: 76 IN | BODY MASS INDEX: 38.36 KG/M2

## 2021-10-11 PROCEDURE — 97803 MED NUTRITION INDIV SUBSEQ: CPT | Mod: 95

## 2021-10-12 ENCOUNTER — APPOINTMENT (OUTPATIENT)
Dept: BARIATRICS | Facility: CLINIC | Age: 41
End: 2021-10-12
Payer: COMMERCIAL

## 2021-10-12 VITALS
DIASTOLIC BLOOD PRESSURE: 84 MMHG | SYSTOLIC BLOOD PRESSURE: 122 MMHG | WEIGHT: 315 LBS | HEIGHT: 76 IN | BODY MASS INDEX: 38.36 KG/M2 | OXYGEN SATURATION: 98 % | TEMPERATURE: 97.2 F | HEART RATE: 84 BPM

## 2021-10-12 PROCEDURE — 99214 OFFICE O/P EST MOD 30 MIN: CPT

## 2021-10-18 NOTE — HISTORY OF PRESENT ILLNESS
[Procedure: ___] : Procedure performed: [unfilled]  [Date of Surgery: ___] : Date of Surgery:   [unfilled] [Surgeon Name:   ___] : Surgeon Name: Dr. NAIDU [Pre-Op Weight ___] : Pre-op weight was [unfilled] lbs [de-identified] : 42 yo M  s/p laparoscopic sleeve gastrectomy and hiatal hernia repair. Weight gain since last visit. Pt saw Dot MORRIS yesterday and it was noted that patient needs to increase his daily protein and water intake.Pt admitted that he occasionally skips meals 2-3 x per week and is scheduled to follow up with weight management on 10//19/21.  Tolerating  regular foods. Taking vitamins daily, but is noncompliant with calcium chews. No acid reflux symptoms, nausea, vomiting, diarrhea and constipation. For exercise, he is walking ~ 10 k steps per day-  3-4 x per week  and participating in  UE strength exercises 10-20 min daily. .

## 2021-10-18 NOTE — REVIEW OF SYSTEMS
[Negative] : Endocrine [Fever] : no fever [Chills] : no chills [Dysphagia] : no dysphagia [Chest Pain] : no chest pain [Palpitations] : no palpitations [Wheezing] : no wheezing [Abdominal Pain] : no abdominal pain [Vomiting] : no vomiting [Reflux/Heartburn] : no reflex/heartburn [Dysuria] : no dysuria [Joint Pain] : no joint pain [Joint Stiffness] : no joint stiffness [Skin Rash] : no skin rash [Skin Wound] : no skin wound [FreeTextEntry2] : weight gain

## 2021-10-18 NOTE — ASSESSMENT
[FreeTextEntry1] : 42 yo M  s/p laparoscopic sleeve gastrectomy and hiatal hernia repair. Weight gain since last visit. Pt saw Dot MORRIS yesterday and it was noted that patient needs to increase his daily protein and water intake. MADELYN - compliant with CPAP.\par \par \par Will continue multivitamins and continue protein and liquid intake as instructed.\par Plan to have additional blood work performed  prior to next visit. \par Follow up 6-8 weeks\par Follow up with nutritionist on 11/17\par Weight management on 10/19\par Call for any questions or concerns.\par \par Nutritional counseling has been provided. The patient is encouraged to remain calorie conscious and continue a low fat, low carbohydrate, protein focus diet. Pt encouraged to participate in a daily exercise regimen incorporating cardio and  strength training.Discussed increasing daily sleep. Following 3 meal a day regimen - increasing daily protein and water intake.\par \par Pt was seen with Dr. Viveros \par \par Additional time spent  reviewing chart before and after visit. \par \par \par Return to office in 6-8 weeks or earlier if any concerns.

## 2021-10-18 NOTE — PHYSICAL EXAM
[Obese] : obese [Normal] : affect appropriate [de-identified] : EOMI , Anicteric  [de-identified] : obese soft non-tender no evidence of hernia

## 2021-10-20 ENCOUNTER — APPOINTMENT (OUTPATIENT)
Dept: BARIATRICS/WEIGHT MGMT | Facility: CLINIC | Age: 41
End: 2021-10-20
Payer: COMMERCIAL

## 2021-10-20 PROCEDURE — 99214 OFFICE O/P EST MOD 30 MIN: CPT | Mod: 95

## 2021-10-20 NOTE — ASSESSMENT
[FreeTextEntry1] : BARIATRIC SURGERY HISTORY: gastric sleeve 5/2021\par OBESITY CO-MORBIDITIES: MADELYN, DM\par ANTI-OBESITY MEDICATIONS: none\par OBESITY MEDICATION SIDE EFFECTS: n/a \par  \par \par \par Plan: \par \par continue to f/u with RD and surgery team\par important to eat consistent meals and protein shake \par recommend he buy bike and ride it daily \par discussed starting qysmia \par continue nightly CPAP \par \par f/u 2 weeks in office \par

## 2021-10-20 NOTE — HISTORY OF PRESENT ILLNESS
[Home] : at home, [unfilled] , at the time of the visit. [Other Location: e.g. Home (Enter Location, City,State)___] : at [unfilled] [Verbal consent obtained from patient] : the patient, [unfilled] [FreeTextEntry1] : This is a 41 year old male  present for obesity followup visit. \par \par Patient's weight up 3-4 pounds\par He typically has 3 meals and a protein shake. He has not had the protein shake the last couple of days \par and has the tendency to skip meals if he is busy running around. 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

## 2021-10-21 ENCOUNTER — TRANSCRIPTION ENCOUNTER (OUTPATIENT)
Age: 41
End: 2021-10-21

## 2021-11-05 ENCOUNTER — APPOINTMENT (OUTPATIENT)
Dept: BARIATRICS/WEIGHT MGMT | Facility: CLINIC | Age: 41
End: 2021-11-05
Payer: COMMERCIAL

## 2021-11-05 VITALS
SYSTOLIC BLOOD PRESSURE: 122 MMHG | TEMPERATURE: 96.8 F | HEIGHT: 76 IN | HEART RATE: 91 BPM | BODY MASS INDEX: 38.36 KG/M2 | WEIGHT: 315 LBS | DIASTOLIC BLOOD PRESSURE: 80 MMHG | OXYGEN SATURATION: 98 %

## 2021-11-05 PROCEDURE — 99213 OFFICE O/P EST LOW 20 MIN: CPT

## 2021-11-05 NOTE — ASSESSMENT
[FreeTextEntry1] : BARIATRIC SURGERY HISTORY: gastric sleeve 5/2021\par OBESITY CO-MORBIDITIES: MADELYN, DM\par ANTI-OBESITY MEDICATIONS: phentermine, topiramate \par OBESITY MEDICATION SIDE EFFECTS: none\par  \par \par \par Plan: \par \par continue to f/u with RD and surgery team\par important to eat consistent meals and protein shake \par recommend he buy bike and ride it daily- buy a bike appropriate for his current weight\par continue phentermine 15mg, increase topiramate to 100mg \par continue nightly CPAP \par \par f/u 4 weeks\par

## 2021-11-05 NOTE — HISTORY OF PRESENT ILLNESS
[FreeTextEntry1] : This is a 41 year old male  present for obesity followup visit. \par \par Patient's weight down 5 pounds\par He started phentermine 15mg and topiramate 50mg, tolerating both well. No change in appetite. \par He typically has 2 meals and a protein shake. \par sees RD  \par \par He has not been exercising regularly because it is too cold. He is looking at purchasing an indoor bike \par \par Currently looking for a new job\par \par

## 2021-11-16 ENCOUNTER — APPOINTMENT (OUTPATIENT)
Dept: BARIATRICS | Facility: CLINIC | Age: 41
End: 2021-11-16
Payer: COMMERCIAL

## 2021-11-16 VITALS
SYSTOLIC BLOOD PRESSURE: 122 MMHG | WEIGHT: 315 LBS | HEIGHT: 76 IN | DIASTOLIC BLOOD PRESSURE: 80 MMHG | OXYGEN SATURATION: 98 % | BODY MASS INDEX: 38.36 KG/M2 | HEART RATE: 93 BPM | TEMPERATURE: 97.6 F

## 2021-11-16 DIAGNOSIS — E61.1 IRON DEFICIENCY: ICD-10-CM

## 2021-11-16 DIAGNOSIS — E53.8 DEFICIENCY OF OTHER SPECIFIED B GROUP VITAMINS: ICD-10-CM

## 2021-11-16 PROCEDURE — 99214 OFFICE O/P EST MOD 30 MIN: CPT

## 2021-11-16 NOTE — PHYSICAL EXAM
[Obese] : obese [Normal] : affect appropriate [de-identified] : EOMI , Anicteric  [de-identified] : obese soft non-tender no evidence of hernia, well healed incisions [de-identified] : ambulating without any difficulties

## 2021-11-16 NOTE — HISTORY OF PRESENT ILLNESS
[Procedure: ___] : Procedure performed: [unfilled]  [Date of Surgery: ___] : Date of Surgery:   [unfilled] [Surgeon Name:   ___] : Surgeon Name: Dr. NAIDU [Pre-Op Weight ___] : Pre-op weight was [unfilled] lbs [de-identified] : 40 yo M s/p laparoscopic sleeve gastrectomy and hiatal hernia repair. Weight loss since last visit. Missing breakfast less frequently - twice a week and missing additional protein shake less frequently - twice a week. Was started on Phentermine and Topiramate and has follow up appointment with Obesity Medicine next month. Drinks adequate water daily.  Taking vitamins daily, but is still noncompliant with calcium chews. No acid reflux symptoms, nausea, vomiting, diarrhea and constipation. For exercise, he is walking ~ 9-10 k steps per day and does  UE strength exercises 15 min daily. Had additional labs drawn on Friday.

## 2021-11-16 NOTE — ASSESSMENT
[FreeTextEntry1] : 42 yo M s/p laparoscopic sleeve gastrectomy and hiatal hernia repair. Weight loss since last visit. Improving with protein and water intake. MADELYN - compliant with CPAP. Reviewed resulted labs - Iron studies normal. Awaiting Vitamin levels. \par \par Low fat, low carbohydrate and high protein diet - 3 meals a day plus one protein shake daily\par Continue walking 10,000 steps daily and incorporate more strength exercises - given exercise packet\par Continue vitamins and take Calcium chews\par Nutritionist appointment tomorrow\par Obesity Medicine follow up next month\par Follow up vitamin levels\par Follow up in 6 weeks\par Call with any questions or concerns\par

## 2021-11-17 ENCOUNTER — APPOINTMENT (OUTPATIENT)
Dept: BARIATRICS/WEIGHT MGMT | Facility: CLINIC | Age: 41
End: 2021-11-17
Payer: COMMERCIAL

## 2021-11-17 ENCOUNTER — TRANSCRIPTION ENCOUNTER (OUTPATIENT)
Age: 41
End: 2021-11-17

## 2021-11-17 VITALS — BODY MASS INDEX: 38.36 KG/M2 | HEIGHT: 76 IN | WEIGHT: 315 LBS

## 2021-11-17 PROCEDURE — 97803 MED NUTRITION INDIV SUBSEQ: CPT | Mod: 95

## 2021-12-03 ENCOUNTER — APPOINTMENT (OUTPATIENT)
Dept: BARIATRICS/WEIGHT MGMT | Facility: CLINIC | Age: 41
End: 2021-12-03
Payer: COMMERCIAL

## 2021-12-03 VITALS — BODY MASS INDEX: 38.36 KG/M2 | WEIGHT: 315 LBS | HEIGHT: 76 IN

## 2021-12-03 PROCEDURE — 99213 OFFICE O/P EST LOW 20 MIN: CPT | Mod: 95

## 2021-12-03 NOTE — HISTORY OF PRESENT ILLNESS
[Home] : at home, [unfilled] , at the time of the visit. [Other Location: e.g. Home (Enter Location, City,State)___] : at [unfilled] [Verbal consent obtained from patient] : the patient, [unfilled] [FreeTextEntry1] : This is a 41 year old male  present for obesity followup visit. \par \par Patient's weight down \par Taking phentermine 15mg and topiramate 100mg, tolerating both well. \par He typically has 2 meals, protein shake not as consisten. \par sees RD  \par \par Patient was given indoor exercises he has been doing every day. 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

## 2021-12-03 NOTE — ASSESSMENT
[FreeTextEntry1] : BARIATRIC SURGERY HISTORY: gastric sleeve 5/2021\par OBESITY CO-MORBIDITIES: MADELYN, DM\par ANTI-OBESITY MEDICATIONS: phentermine, topiramate \par OBESITY MEDICATION SIDE EFFECTS: none\par  \par \par \par Plan: \par \par continue to f/u with RD and surgery team\par important to eat consistent meals and protein shake \par discussed need of daily exercise- find an indoor bike appropriate for current weight, and increase home exercises\par continue phentermine 15mg, increase topiramate to 100mg \par continue nightly CPAP \par \par f/u 4 weeks\par

## 2021-12-10 LAB
25(OH)D3 SERPL-MCNC: 34.5 NG/ML
BASOPHILS # BLD AUTO: 0.07 K/UL
BASOPHILS NFR BLD AUTO: 0.7 %
EOSINOPHIL # BLD AUTO: 0.07 K/UL
EOSINOPHIL NFR BLD AUTO: 0.7 %
FERRITIN SERPL-MCNC: 198 NG/ML
HCT VFR BLD CALC: 44.9 %
HGB BLD-MCNC: 13.8 G/DL
IMM GRANULOCYTES NFR BLD AUTO: 0.4 %
IRON SATN MFR SERPL: 25 %
IRON SERPL-MCNC: 71 UG/DL
LYMPHOCYTES # BLD AUTO: 2.51 K/UL
LYMPHOCYTES NFR BLD AUTO: 25.8 %
MAN DIFF?: NORMAL
MCHC RBC-ENTMCNC: 25.6 PG
MCHC RBC-ENTMCNC: 30.7 GM/DL
MCV RBC AUTO: 83.1 FL
MONOCYTES # BLD AUTO: 0.62 K/UL
MONOCYTES NFR BLD AUTO: 6.4 %
NEUTROPHILS # BLD AUTO: 6.4 K/UL
NEUTROPHILS NFR BLD AUTO: 66 %
PLATELET # BLD AUTO: 275 K/UL
RBC # BLD: 5.4 M/UL
RBC # FLD: 15.1 %
TIBC SERPL-MCNC: 280 UG/DL
UIBC SERPL-MCNC: 209 UG/DL
VIT A SERPL-MCNC: 50.4 UG/DL
VIT B1 SERPL-MCNC: 121.2 NMOL/L
VIT B2 SERPL-MCNC: 201 UG/L
VIT B6 SERPL-MCNC: 12.3 UG/L
WBC # FLD AUTO: 9.71 K/UL

## 2021-12-13 ENCOUNTER — APPOINTMENT (OUTPATIENT)
Dept: PULMONOLOGY | Facility: CLINIC | Age: 41
End: 2021-12-13

## 2021-12-15 ENCOUNTER — TRANSCRIPTION ENCOUNTER (OUTPATIENT)
Age: 41
End: 2021-12-15

## 2021-12-20 ENCOUNTER — APPOINTMENT (OUTPATIENT)
Dept: BARIATRICS/WEIGHT MGMT | Facility: CLINIC | Age: 41
End: 2021-12-20
Payer: COMMERCIAL

## 2021-12-20 VITALS
HEIGHT: 76 IN | DIASTOLIC BLOOD PRESSURE: 80 MMHG | WEIGHT: 315 LBS | HEART RATE: 78 BPM | OXYGEN SATURATION: 100 % | SYSTOLIC BLOOD PRESSURE: 130 MMHG | BODY MASS INDEX: 38.36 KG/M2 | TEMPERATURE: 97.3 F

## 2021-12-20 PROCEDURE — 99214 OFFICE O/P EST MOD 30 MIN: CPT

## 2021-12-28 ENCOUNTER — APPOINTMENT (OUTPATIENT)
Dept: BARIATRICS | Facility: CLINIC | Age: 41
End: 2021-12-28
Payer: COMMERCIAL

## 2021-12-28 VITALS
SYSTOLIC BLOOD PRESSURE: 130 MMHG | DIASTOLIC BLOOD PRESSURE: 76 MMHG | TEMPERATURE: 96.7 F | HEIGHT: 76 IN | HEART RATE: 76 BPM | OXYGEN SATURATION: 99 % | WEIGHT: 315 LBS | BODY MASS INDEX: 38.36 KG/M2

## 2021-12-28 PROCEDURE — 99214 OFFICE O/P EST MOD 30 MIN: CPT

## 2021-12-28 NOTE — HISTORY OF PRESENT ILLNESS
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

## 2021-12-28 NOTE — ASSESSMENT
[FreeTextEntry1] : Plan: \par \par continue to f/u with RD and surgery team\par important to eat consistent meals and protein shake \par continue daily physical activity \par continue phentermine 15mg, topiramate to 100mg \par continue nightly CPAP \par \par f/u 4 weeks\par . \par

## 2022-01-03 NOTE — ASSESSMENT
[FreeTextEntry1] : 40 yo M s/p laparoscopic sleeve gastrectomy and hiatal hernia repair.Current weight is 438 lbs.  Weight loss since last visit. Improving with protein and water intake. MADELYN - compliant with CPAP. Received booster. \par \par Low fat, low carbohydrate and high protein diet - 3 meals a day plus one protein shake daily\par Continue walking 10,000 steps daily and incorporate more strength exercises - given exercise packet\par Continue vitamins and take Calcium chews\par Nutritionist  follow up visit scheduled - on 1/11/2022\par Obesity Medicine follow up next month\par Dr. Viveros saw patient. \par Follow up in 8 weeks\par Call with any questions or concerns\par \par Additional time spent  reviewing chart before and after visit. \par

## 2022-01-03 NOTE — HISTORY OF PRESENT ILLNESS
[Procedure: ___] : Procedure performed: [unfilled]  [Date of Surgery: ___] : Date of Surgery:   [unfilled] [Surgeon Name:   ___] : Surgeon Name: Dr. NAIDU [Pre-Op Weight ___] : Pre-op weight was [unfilled] lbs [de-identified] : 40 yo M s/p laparoscopic sleeve gastrectomy and hiatal hernia repair.Current weight is 438 lbs.  Weight loss since last visit. Pt is consuming 3 meals per day and continues taking daily  Phentermine and Topiramate and will follow up with  Obesity Medicine as advised. Drinks adequate water daily.  Taking vitamins daily as advised . No acid reflux symptoms, nausea, vomiting, diarrhea and constipation. For exercise, he is walking ~ 9-10 k steps per day and does  UE strength exercises 15 min daily. Recent labs were performed in November 2021. Follow up with nutritionist scheduled on 1/11/2022.

## 2022-01-03 NOTE — PHYSICAL EXAM
[Obese] : obese [Normal] : affect appropriate [de-identified] : EOMI , Anicteric  [de-identified] : obese soft non tender  / no erythema no swelling noted  no evidence of hernia  [de-identified] : ambulating without any difficulties

## 2022-01-03 NOTE — REVIEW OF SYSTEMS
[Recent Change In Weight] : ~T recent weight change [Negative] : Endocrine [Fever] : no fever [Chills] : no chills [Dysphagia] : no dysphagia [Chest Pain] : no chest pain [Palpitations] : no palpitations [Shortness Of Breath] : no shortness of breath [Wheezing] : no wheezing [Abdominal Pain] : no abdominal pain [Vomiting] : no vomiting [Reflux/Heartburn] : no reflex/heartburn [Dysuria] : no dysuria [Joint Pain] : no joint pain [Joint Stiffness] : no joint stiffness [Skin Rash] : no skin rash [Skin Wound] : no skin wound [FreeTextEntry2] : weight loss since last visit.

## 2022-01-13 ENCOUNTER — APPOINTMENT (OUTPATIENT)
Dept: BARIATRICS/WEIGHT MGMT | Facility: CLINIC | Age: 42
End: 2022-01-13
Payer: COMMERCIAL

## 2022-01-13 ENCOUNTER — TRANSCRIPTION ENCOUNTER (OUTPATIENT)
Age: 42
End: 2022-01-13

## 2022-01-13 PROCEDURE — 97803 MED NUTRITION INDIV SUBSEQ: CPT | Mod: 95

## 2022-01-18 ENCOUNTER — TRANSCRIPTION ENCOUNTER (OUTPATIENT)
Age: 42
End: 2022-01-18

## 2022-01-24 VITALS — WEIGHT: 315 LBS | HEIGHT: 76 IN | BODY MASS INDEX: 38.36 KG/M2

## 2022-02-02 ENCOUNTER — APPOINTMENT (OUTPATIENT)
Dept: BARIATRICS/WEIGHT MGMT | Facility: CLINIC | Age: 42
End: 2022-02-02
Payer: COMMERCIAL

## 2022-02-02 PROCEDURE — 99213 OFFICE O/P EST LOW 20 MIN: CPT | Mod: 95

## 2022-02-03 NOTE — HISTORY OF PRESENT ILLNESS
[Home] : at home, [unfilled] , at the time of the visit. [Other Location: e.g. Home (Enter Location, City,State)___] : at [unfilled] [Verbal consent obtained from patient] : the patient, [unfilled] [FreeTextEntry1] : This is a 41 year old male  present for obesity followup visit. \par \par He did not weigh himself yet \par Taking phentermine 15mg and topiramate 100mg, tolerating both well\par \par Sees bariatric team regularly \par Consuming 2 meals plus 1 shake a day, he has been more consistent with daily protein shakes \par Doing body weight exercises at home twice a day, using resistance bands as well\par \par Sleeping well most nights. 6-7 hours, depends on what time he goes to bed. wearing CPAP regularly.  He does not feel Phentermine effects his sleep at all.

## 2022-02-03 NOTE — ASSESSMENT
[FreeTextEntry1] : BARIATRIC SURGERY HISTORY: gastric sleeve 5/2021\par OBESITY CO-MORBIDITIES: MADELYN, DM\par ANTI-OBESITY MEDICATIONS: phentermine, topiramate \par OBESITY MEDICATION SIDE EFFECTS: none\par \par  \par Plan: \par \par continue to f/u with RD and surgery team\par important to eat consistent meals and protein shake \par continue daily physical activity and increase as tolerated. encouraged to add cardio as well \par continue phentermine 15mg, topiramate to 100mg \par continue nightly CPAP \par \par f/u 4 weeks\par

## 2022-02-19 ENCOUNTER — TRANSCRIPTION ENCOUNTER (OUTPATIENT)
Age: 42
End: 2022-02-19

## 2022-02-22 ENCOUNTER — APPOINTMENT (OUTPATIENT)
Dept: BARIATRICS | Facility: CLINIC | Age: 42
End: 2022-02-22
Payer: COMMERCIAL

## 2022-02-22 VITALS
WEIGHT: 315 LBS | BODY MASS INDEX: 38.36 KG/M2 | OXYGEN SATURATION: 98 % | HEIGHT: 76 IN | DIASTOLIC BLOOD PRESSURE: 74 MMHG | HEART RATE: 89 BPM | SYSTOLIC BLOOD PRESSURE: 130 MMHG | TEMPERATURE: 96.6 F

## 2022-02-22 PROCEDURE — 99214 OFFICE O/P EST MOD 30 MIN: CPT

## 2022-02-22 NOTE — HISTORY OF PRESENT ILLNESS
[de-identified] : 42 yo M s/p laparoscopic sleeve gastrectomy and hiatal hernia repair.  Weight loss since last visit. Pt is consuming 2 meals and 1 shake per day and taking MVI. Patient is following with Obesity medicine and is taking  Phentermine and Topiramate. Drinks adequate water daily.  No acid reflux symptoms, nausea, vomiting, diarrhea and constipation. 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 Saw nutritionist scheduled on 1/13/2022.   [Procedure: ___] : Procedure performed: [unfilled]  [Date of Surgery: ___] : Date of Surgery:   [unfilled] [Surgeon Name:   ___] : Surgeon Name: Dr. NAIDU [Pre-Op Weight ___] : Pre-op weight was [unfilled] lbs

## 2022-02-22 NOTE — ASSESSMENT
[FreeTextEntry1] : 40 yo M s/p laparoscopic sleeve gastrectomy and hiatal hernia repair. Weight loss since last visit. Improving with protein and water intake. MADELYN - compliant with CPAP. \par \par Low fat, low carbohydrate and  protein focused diet - 2 meals a day plus one protein shake daily\par Continue walking 10,000 steps daily and incorporate more strength exercises\par Continue vitamins and take Calcium chews\par Obesity Medicine follow up\par Follow up in 8 weeks with labs\par Call with any questions or concerns\par \par Additional time spent  reviewing chart before and after visit. \par

## 2022-02-22 NOTE — PHYSICAL EXAM
[Obese, well nourished, in no acute distress] : obese, well nourished, in no acute distress [Normal] : PERRL, EOMI, no conjunctival infection, anicteric [de-identified] : obese, soft, nontender, no evidence of hernia, incisions well healed

## 2022-03-01 ENCOUNTER — APPOINTMENT (OUTPATIENT)
Dept: BARIATRICS/WEIGHT MGMT | Facility: CLINIC | Age: 42
End: 2022-03-01
Payer: COMMERCIAL

## 2022-03-01 PROCEDURE — 97803 MED NUTRITION INDIV SUBSEQ: CPT | Mod: 95

## 2022-03-02 VITALS — HEIGHT: 76 IN | BODY MASS INDEX: 38.36 KG/M2 | WEIGHT: 315 LBS

## 2022-03-09 ENCOUNTER — APPOINTMENT (OUTPATIENT)
Dept: BARIATRICS/WEIGHT MGMT | Facility: CLINIC | Age: 42
End: 2022-03-09
Payer: COMMERCIAL

## 2022-03-09 VITALS — HEIGHT: 76 IN | WEIGHT: 315 LBS | BODY MASS INDEX: 38.36 KG/M2

## 2022-03-09 PROCEDURE — 99213 OFFICE O/P EST LOW 20 MIN: CPT | Mod: 95

## 2022-03-10 NOTE — HISTORY OF PRESENT ILLNESS
[FreeTextEntry1] : This is a 41 year old male  present for obesity followup visit. \par \par Patient's weight down \par Taking phentermine 15mg and topiramate 100mg, tolerating both well\par \par Sees bariatric team regularly \par He is eating 3 meals, and drinks a shake 5 days a week. In the last week patient states dinner has been difficult to get in because is he full. \par B: egg sandwich\par L: turkey and lettuce and shake \par D: meat and veggies \par Doing body weight exercises 6 days a week. He is active at work \par \par Sleeping well most nights, not wearing CPAP regularly.

## 2022-03-10 NOTE — ASSESSMENT
[FreeTextEntry1] : BARIATRIC SURGERY HISTORY: gastric sleeve 5/2021\par OBESITY CO-MORBIDITIES: MADELYN, DM\par ANTI-OBESITY MEDICATIONS: phentermine, topiramate \par OBESITY MEDICATION SIDE EFFECTS: none\par \par  \par Plan: \par \par continue to f/u with RD and surgery team\par important to eat consistent meals and protein shake \par eliminate refined carbohydrates \par continue daily physical activity and increase as tolerated. discussed adding cardio \par continue phentermine 15mg, topiramate to 100mg- do not want to increase meds at this time as it may suppress appetite too much \par wear CPAP every night \par \par f/u 4 weeks\par

## 2022-03-21 ENCOUNTER — TRANSCRIPTION ENCOUNTER (OUTPATIENT)
Age: 42
End: 2022-03-21

## 2022-03-25 ENCOUNTER — TRANSCRIPTION ENCOUNTER (OUTPATIENT)
Age: 42
End: 2022-03-25

## 2022-04-04 ENCOUNTER — APPOINTMENT (OUTPATIENT)
Dept: BARIATRICS/WEIGHT MGMT | Facility: CLINIC | Age: 42
End: 2022-04-04
Payer: COMMERCIAL

## 2022-04-04 VITALS — HEIGHT: 76 IN | BODY MASS INDEX: 38.36 KG/M2 | WEIGHT: 315 LBS

## 2022-04-04 PROCEDURE — 99443: CPT

## 2022-04-18 ENCOUNTER — TRANSCRIPTION ENCOUNTER (OUTPATIENT)
Age: 42
End: 2022-04-18

## 2022-05-02 LAB
25(OH)D3 SERPL-MCNC: 34.4 NG/ML
ALBUMIN SERPL ELPH-MCNC: 4.4 G/DL
ALP BLD-CCNC: 74 U/L
ALT SERPL-CCNC: 25 U/L
ANION GAP SERPL CALC-SCNC: 12 MMOL/L
AST SERPL-CCNC: 24 U/L
BASOPHILS # BLD AUTO: 0.06 K/UL
BASOPHILS NFR BLD AUTO: 0.6 %
BILIRUB SERPL-MCNC: 0.4 MG/DL
BUN SERPL-MCNC: 18 MG/DL
CALCIUM SERPL-MCNC: 9.5 MG/DL
CHLORIDE SERPL-SCNC: 104 MMOL/L
CHOLEST SERPL-MCNC: 163 MG/DL
CO2 SERPL-SCNC: 25 MMOL/L
CREAT SERPL-MCNC: 0.99 MG/DL
EGFR: 99 ML/MIN/1.73M2
EOSINOPHIL # BLD AUTO: 0.08 K/UL
EOSINOPHIL NFR BLD AUTO: 0.8 %
FERRITIN SERPL-MCNC: 214 NG/ML
FOLATE SERPL-MCNC: >20 NG/ML
GLUCOSE SERPL-MCNC: 101 MG/DL
HCT VFR BLD CALC: 45.5 %
HDLC SERPL-MCNC: 41 MG/DL
HGB BLD-MCNC: 14 G/DL
IMM GRANULOCYTES NFR BLD AUTO: 0.3 %
IRON SATN MFR SERPL: 23 %
IRON SERPL-MCNC: 59 UG/DL
LDLC SERPL CALC-MCNC: 110 MG/DL
LYMPHOCYTES # BLD AUTO: 2.25 K/UL
LYMPHOCYTES NFR BLD AUTO: 22 %
MAN DIFF?: NORMAL
MCHC RBC-ENTMCNC: 25.5 PG
MCHC RBC-ENTMCNC: 30.8 GM/DL
MCV RBC AUTO: 83 FL
MONOCYTES # BLD AUTO: 0.71 K/UL
MONOCYTES NFR BLD AUTO: 6.9 %
NEUTROPHILS # BLD AUTO: 7.11 K/UL
NEUTROPHILS NFR BLD AUTO: 69.4 %
NONHDLC SERPL-MCNC: 122 MG/DL
PLATELET # BLD AUTO: 313 K/UL
POTASSIUM SERPL-SCNC: 4.6 MMOL/L
PROT SERPL-MCNC: 8.3 G/DL
RBC # BLD: 5.48 M/UL
RBC # FLD: 14.6 %
SODIUM SERPL-SCNC: 141 MMOL/L
TIBC SERPL-MCNC: 259 UG/DL
TRIGL SERPL-MCNC: 58 MG/DL
UIBC SERPL-MCNC: 199 UG/DL
VIT B12 SERPL-MCNC: 1434 PG/ML
WBC # FLD AUTO: 10.24 K/UL

## 2022-05-03 ENCOUNTER — APPOINTMENT (OUTPATIENT)
Dept: BARIATRICS | Facility: CLINIC | Age: 42
End: 2022-05-03
Payer: COMMERCIAL

## 2022-05-03 VITALS
DIASTOLIC BLOOD PRESSURE: 80 MMHG | TEMPERATURE: 98.1 F | SYSTOLIC BLOOD PRESSURE: 114 MMHG | HEART RATE: 90 BPM | WEIGHT: 315 LBS | OXYGEN SATURATION: 100 % | HEIGHT: 76 IN | BODY MASS INDEX: 38.36 KG/M2

## 2022-05-03 PROCEDURE — 99214 OFFICE O/P EST MOD 30 MIN: CPT

## 2022-05-03 NOTE — HISTORY OF PRESENT ILLNESS
[de-identified] : 40 yo M s/p laparoscopic sleeve gastrectomy and hiatal hernia repair.  Weight stable since last visit. Pt is consuming 3 meals, 1 shake and one snack (Greek yogurt) per day, 75 oz fluid and taking MVI.  Brief dietary recall breakfast eggs with cheese onions and peppers and savage, Greek yogurt snack, protein drink before lunch, lunch salad with chicken cheese and light Caesar dressing, dinner usually a protein and vegetable.  Occasional snacking.  Discussed eliminating savage and cheese and meals as well as need for extra protein in Greek yogurt and protein drink.  Eliminate snacking. patient is following with Obesity medicine and is taking  Phentermine and Topiramate. Drinks adequate water daily.  No acid reflux symptoms, nausea, vomiting, diarrhea and constipation. 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  Nutritionist scheduled on 5/24/2022.   [Procedure: ___] : Procedure performed: [unfilled]  [Date of Surgery: ___] : Date of Surgery:   [unfilled] [Surgeon Name:   ___] : Surgeon Name: Dr. NAIDU [Pre-Op Weight ___] : Pre-op weight was [unfilled] lbs

## 2022-05-03 NOTE — ASSESSMENT
[FreeTextEntry1] : 42 yo M s/p laparoscopic sleeve gastrectomy and hiatal hernia repair. Weight stable since last visit.  Nutrition and exercise guidelines were reviewed with the patient.  Patient may be having too many meals and too much protein.  Recent labs were reviewed with the patientl all good.\par \par Low fat, low carbohydrate and  protein focused diet -avoid snacking, extra meal if low on protein\par Encouraged zero-calorie liquids\par Continue walking 10,000 steps daily and incorporate more strength exercises\par Continue vitamins and take Calcium chews\par Follow-up with nutritionist 5/24/2020\par Obesity Medicine follow up\par Follow up in 3 months\par Call with any questions or concerns\par \par Additional time spent  reviewing chart before and after visit. \par

## 2022-05-03 NOTE — PHYSICAL EXAM
[Obese, well nourished, in no acute distress] : obese, well nourished, in no acute distress [Normal] : affect appropriate [de-identified] : obese, soft, nontender, no evidence of hernia, incisions well healed

## 2022-05-18 ENCOUNTER — APPOINTMENT (OUTPATIENT)
Dept: BARIATRICS/WEIGHT MGMT | Facility: CLINIC | Age: 42
End: 2022-05-18
Payer: COMMERCIAL

## 2022-05-18 PROCEDURE — 99443: CPT

## 2022-05-19 LAB
VIT A SERPL-MCNC: 53.2 UG/DL
VIT B1 SERPL-MCNC: 138.4 NMOL/L
VIT B2 SERPL-MCNC: 376 UG/L
VIT B6 SERPL-MCNC: 13.7 UG/L

## 2022-05-24 ENCOUNTER — APPOINTMENT (OUTPATIENT)
Dept: BARIATRICS/WEIGHT MGMT | Facility: CLINIC | Age: 42
End: 2022-05-24
Payer: COMMERCIAL

## 2022-05-24 PROCEDURE — 97803 MED NUTRITION INDIV SUBSEQ: CPT | Mod: 95

## 2022-05-26 VITALS — WEIGHT: 315 LBS | BODY MASS INDEX: 38.36 KG/M2 | HEIGHT: 76 IN

## 2022-06-01 ENCOUNTER — APPOINTMENT (OUTPATIENT)
Dept: BARIATRICS/WEIGHT MGMT | Facility: CLINIC | Age: 42
End: 2022-06-01
Payer: COMMERCIAL

## 2022-06-01 VITALS — BODY MASS INDEX: 38.36 KG/M2 | HEIGHT: 76 IN | WEIGHT: 315 LBS

## 2022-06-01 PROCEDURE — 99213 OFFICE O/P EST LOW 20 MIN: CPT | Mod: 95

## 2022-06-01 NOTE — ASSESSMENT
[FreeTextEntry1] : BARIATRIC SURGERY HISTORY: gastric sleeve 5/2021\par OBESITY CO-MORBIDITIES: MADELYN, DM\par ANTI-OBESITY MEDICATIONS: phentermine, topiramate \par OBESITY MEDICATION SIDE EFFECTS: none\par \par \par \par Plan:\par continue phentermine 30mg po daily and topiramate 100mg\par discussed importance of daily physical activity with active rest days from gym ie: walking \par f/u with RD and surgery \par recommend whole food, lean animal protein \par cpap nightly \par \par f/u 4 weeks \par

## 2022-06-01 NOTE — HISTORY OF PRESENT ILLNESS
[FreeTextEntry1] : This is a 41 year old male  being seen obesity followup visit.\par \par Patient is down 3-4 pounds\par Taking phentermine 30mg and topiramate 100mg, tolerating both well. \par \par He has been working with RD- plan is to cut out cheese \par BBQ'ing at home- this past weekend ate chicken one night and burger the other \par \par Only going to gym 2 days a week so he can get more sleep\par 5-6 hours of sleep without the gym. Patient states he stays up late \par He plans to open his pool tonight \par \par \par Using CPAP most nights

## 2022-06-14 ENCOUNTER — TRANSCRIPTION ENCOUNTER (OUTPATIENT)
Age: 42
End: 2022-06-14

## 2022-07-03 ENCOUNTER — NON-APPOINTMENT (OUTPATIENT)
Age: 42
End: 2022-07-03

## 2022-07-06 ENCOUNTER — APPOINTMENT (OUTPATIENT)
Dept: BARIATRICS/WEIGHT MGMT | Facility: CLINIC | Age: 42
End: 2022-07-06
Payer: COMMERCIAL

## 2022-07-06 VITALS — HEIGHT: 76 IN | WEIGHT: 315 LBS | BODY MASS INDEX: 38.36 KG/M2

## 2022-07-06 PROCEDURE — 99213 OFFICE O/P EST LOW 20 MIN: CPT | Mod: 95
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
## 2022-07-06 NOTE — ASSESSMENT
[FreeTextEntry1] : BARIATRIC SURGERY HISTORY: gastric sleeve 5/2021\par OBESITY CO-MORBIDITIES: MADELYN, DM\par ANTI-OBESITY MEDICATIONS: phentermine, topiramate \par OBESITY MEDICATION SIDE EFFECTS: none\par \par \par \par Plan:\par continue phentermine 30mg po daily and topiramate 100mg\par poor sleep most likely associated with pain. recommended skipping dose if he can not take it first thing in the morning\par see spine doctor- if cleared return to gym and use pool \par f/u with RD and surgery \par recommend whole food, lean animal protein \par cpap nightly \par \par f/u 4-6 weeks \par

## 2022-07-13 ENCOUNTER — APPOINTMENT (OUTPATIENT)
Dept: PHYSICAL MEDICINE AND REHAB | Facility: CLINIC | Age: 42
End: 2022-07-13

## 2022-07-19 ENCOUNTER — APPOINTMENT (OUTPATIENT)
Dept: BARIATRICS/WEIGHT MGMT | Facility: CLINIC | Age: 42
End: 2022-07-19

## 2022-07-19 ENCOUNTER — TRANSCRIPTION ENCOUNTER (OUTPATIENT)
Age: 42
End: 2022-07-19

## 2022-07-19 PROCEDURE — 97803 MED NUTRITION INDIV SUBSEQ: CPT | Mod: 95

## 2022-07-20 VITALS — WEIGHT: 315 LBS | HEIGHT: 76 IN | BODY MASS INDEX: 38.36 KG/M2

## 2022-08-02 ENCOUNTER — APPOINTMENT (OUTPATIENT)
Dept: BARIATRICS | Facility: CLINIC | Age: 42
End: 2022-08-02
Payer: COMMERCIAL

## 2022-08-02 VITALS
WEIGHT: 315 LBS | DIASTOLIC BLOOD PRESSURE: 82 MMHG | HEART RATE: 87 BPM | HEIGHT: 76 IN | SYSTOLIC BLOOD PRESSURE: 116 MMHG | BODY MASS INDEX: 38.36 KG/M2 | OXYGEN SATURATION: 99 % | TEMPERATURE: 97.4 F

## 2022-08-02 PROCEDURE — 99401 PREV MED CNSL INDIV APPRX 15: CPT | Mod: 25

## 2022-08-02 PROCEDURE — 99214 OFFICE O/P EST MOD 30 MIN: CPT

## 2022-08-08 NOTE — ASSESSMENT
[FreeTextEntry1] : 42 yo M 1 year s/p laparoscopic sleeve gastrectomy and hiatal hernia repair. Weight stable since last visit.\par Doing well overall.\par \par Greater than 15 minutes spent with pt discussing importance of health maintenance principles including both dietary and lifestyle changes\par Reviewed guidelines about nutrition and snacking - protein focus diet\par Encouraged better food choices - maintain food log\par Continue MVI and daily zero calorie liquid intake 64 oz/day\par Continue PT - exercise regimen incorporating cardio and strength training as tolerated\par Track steps - goal approximately 8-10k steps per day\par \par Labs ordered to be done before next visit\par \par Return to office in 2 months\par Follow up with Nutritionist Dot Rosado\par Follow up with Obesity Medicine as scheduled\par All questions answered\par \par Pt seen with Dr. Viveros\par \par Additional time spent before and after visit reviewing chart

## 2022-08-08 NOTE — PHYSICAL EXAM
[Obese] : obese [Normal] : affect appropriate [de-identified] : soft, NT, ND, no diastasis appreciated, incisions well healed
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
## 2022-08-16 ENCOUNTER — TRANSCRIPTION ENCOUNTER (OUTPATIENT)
Age: 42
End: 2022-08-16

## 2022-08-17 ENCOUNTER — APPOINTMENT (OUTPATIENT)
Dept: BARIATRICS/WEIGHT MGMT | Facility: CLINIC | Age: 42
End: 2022-08-17
Payer: COMMERCIAL

## 2022-08-17 PROCEDURE — 99213 OFFICE O/P EST LOW 20 MIN: CPT | Mod: 95
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
## 2022-08-17 NOTE — ASSESSMENT
[FreeTextEntry1] : BARIATRIC SURGERY HISTORY: gastric sleeve 5/2021\par OBESITY CO-MORBIDITIES: MADELYN, DM\par ANTI-OBESITY MEDICATIONS: phentermine, topiramate \par OBESITY MEDICATION SIDE EFFECTS: none\par \par \par \par Plan:\par continue phentermine 30mg po daily and topiramate 100mg\par keep food journal \par f/u with RD and surgery \par recommend whole food, lean animal protein \par cpap nightly \par encouraged physical activity if tolerated \par \par f/u 2 weeks in person \par

## 2022-08-17 NOTE — REASON FOR VISIT
[Home] : at home, [unfilled] , at the time of the visit. [Other Location: e.g. Home (Enter Location, City,State)___] : at [unfilled] [Patient] : the patient [Follow-Up] : a follow-up visit

## 2022-09-02 ENCOUNTER — APPOINTMENT (OUTPATIENT)
Dept: BARIATRICS/WEIGHT MGMT | Facility: CLINIC | Age: 42
End: 2022-09-02
Payer: COMMERCIAL

## 2022-09-02 VITALS
HEIGHT: 76 IN | WEIGHT: 315 LBS | DIASTOLIC BLOOD PRESSURE: 84 MMHG | HEART RATE: 84 BPM | TEMPERATURE: 97 F | BODY MASS INDEX: 38.36 KG/M2 | OXYGEN SATURATION: 99 % | SYSTOLIC BLOOD PRESSURE: 120 MMHG

## 2022-09-02 PROCEDURE — 99213 OFFICE O/P EST LOW 20 MIN: CPT

## 2022-09-06 NOTE — ASSESSMENT
[FreeTextEntry1] : BARIATRIC SURGERY HISTORY: gastric sleeve 5/2021\par OBESITY CO-MORBIDITIES: MADELYN, DM\par ANTI-OBESITY MEDICATIONS: phentermine, topiramate \par OBESITY MEDICATION SIDE EFFECTS: none\par \par \par \par Plan:\par continue phentermine 30mg po daily and topiramate 100mg\par keep food journal \par f/u with RD and surgery \par recommend whole food, lean animal protein \par cpap nightly \par encouraged physical activity if tolerated\par continue PT \par \par f/u 4-6 weeks

## 2022-09-06 NOTE — HISTORY OF PRESENT ILLNESS
[FreeTextEntry1] : This is a 42 year old male  being seen obesity followup visit.\par \par Patient's weight trending up\par Continues to experience back and ankle pain from surgery. Exercise is limited\par going to PT 4 days a week \par Going to ankle specialist in 2 weeks\par \par Reports 2 meals most days, needs to set appt with RD\par School is starting next week, plans to have yogurt and protein shake for breakfast and salad for lunch \par \par Sleep is worse as he wakes up in pain\par Has taken muscle relaxants, does not help \par Wears cpap most nights

## 2022-09-12 ENCOUNTER — APPOINTMENT (OUTPATIENT)
Dept: ORTHOPEDIC SURGERY | Facility: CLINIC | Age: 42
End: 2022-09-12

## 2022-09-12 ENCOUNTER — NON-APPOINTMENT (OUTPATIENT)
Age: 42
End: 2022-09-12

## 2022-09-12 VITALS
HEIGHT: 76 IN | SYSTOLIC BLOOD PRESSURE: 125 MMHG | DIASTOLIC BLOOD PRESSURE: 83 MMHG | BODY MASS INDEX: 38.36 KG/M2 | HEART RATE: 86 BPM | WEIGHT: 315 LBS

## 2022-09-12 PROCEDURE — 73610 X-RAY EXAM OF ANKLE: CPT | Mod: LT

## 2022-09-12 PROCEDURE — 99204 OFFICE O/P NEW MOD 45 MIN: CPT

## 2022-09-12 PROCEDURE — 99072 ADDL SUPL MATRL&STAF TM PHE: CPT

## 2022-09-12 NOTE — ADDENDUM
[FreeTextEntry1] : I, Katie Gonzalez, acted solely as a scribe for Dr. Giles Sweet on this date 09/12/2022.\par \par All medical record entries made by the Scribe were at my, Dr. Giles Sweet, direction and personally dictated by me on 09/12/2022. I have reviewed the chart and agree that the record accurately reflects my personal performance of the history, physical exam, assessment and plan. I have also personally directed, reviewed, and agreed with the chart.	\par
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
## 2022-09-12 NOTE — PHYSICAL EXAM
[de-identified] : General: Alert and oriented x3. In no acute distress. Pleasant in nature with a normal affect. No apparent respiratory distress.\par \par Left Ankle Exam\par Skin: Clean, dry, intact\par Inspection: Pes planus. Medial prominence. No obvious malalignment, + lateral soft tissue swelling, no effusion; no lymphadenopathy\par Pulses: 2+ DP/PT pulses\par ROM:10 degrees of dorsiflexion, 40 degrees of plantarflexion, 10 degrees of subtalar motion\par Tenderness: Pain over the distal fibula. Pain over the lateral gutter. Pain over the peroneals. No medial malleolus pain, no deltoid ligament pain. No proximal fibular pain. No heel pain.\par Stability: Negative anterior/posterior drawer.\par Strength: 5/5 TA/GS/EHL\par Neuro: In tact to light touch throughout\par Additional tests: Negative Mortons test, Negative syndesmosis squeeze test. [de-identified] : 3V of the left ankle were ordered, obtained, and reviewed by me today, 09/12/2022, revealed: Os trigonum. Pes planus.
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
## 2022-09-15 ENCOUNTER — TRANSCRIPTION ENCOUNTER (OUTPATIENT)
Age: 42
End: 2022-09-15

## 2022-09-20 ENCOUNTER — APPOINTMENT (OUTPATIENT)
Dept: BARIATRICS/WEIGHT MGMT | Facility: CLINIC | Age: 42
End: 2022-09-20

## 2022-09-20 VITALS — BODY MASS INDEX: 38.36 KG/M2 | HEIGHT: 76 IN | WEIGHT: 315 LBS

## 2022-09-20 PROCEDURE — 97803 MED NUTRITION INDIV SUBSEQ: CPT | Mod: 95

## 2022-09-30 ENCOUNTER — APPOINTMENT (OUTPATIENT)
Dept: ORTHOPEDIC SURGERY | Facility: CLINIC | Age: 42
End: 2022-09-30

## 2022-09-30 PROCEDURE — 99072 ADDL SUPL MATRL&STAF TM PHE: CPT

## 2022-09-30 PROCEDURE — 99214 OFFICE O/P EST MOD 30 MIN: CPT
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
## 2022-09-30 NOTE — DISCUSSION/SUMMARY
[de-identified] : Today I had a lengthy discussion with the patient regarding their left ankle and right knee pain. I have addressed all the patient's concerns surrounding the pathology of their condition. MRI results of the left ankle and right knee were reviewed in the office today. I recommend the patient undergo a course of physical therapy for the left ankle and right knee  2-3 times a week for a total of 8-12 weeks. A prescription was given for the physical therapy today. I recommended that the patient utilize an ASO brace. The patient was fitted for the ASO brace in the office today. The patient understood and verbally agreed to the treatment plan. All of their questions were answered and they were satisfied with the visit. The patient should call the office if they have any questions or experience worsening symptoms. I would like to see the patient back in the office in 2-3 months to reassess their condition. 				\par

## 2022-09-30 NOTE — ADDENDUM
[FreeTextEntry1] : I, Katie Gonzalez, acted solely as a scribe for Dr. Giles Sweet on this date 09/30/2022.\par \par All medical record entries made by the Scribe were at my, Dr. Giles Sweet, direction and personally dictated by me on 09/30/2022. I have reviewed the chart and agree that the record accurately reflects my personal performance of the history, physical exam, assessment and plan. I have also personally directed, reviewed, and agreed with the chart.	\par
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
## 2022-10-11 ENCOUNTER — APPOINTMENT (OUTPATIENT)
Dept: BARIATRICS | Facility: CLINIC | Age: 42
End: 2022-10-11
Payer: COMMERCIAL

## 2022-10-11 VITALS
HEART RATE: 102 BPM | OXYGEN SATURATION: 99 % | WEIGHT: 315 LBS | BODY MASS INDEX: 38.36 KG/M2 | DIASTOLIC BLOOD PRESSURE: 80 MMHG | TEMPERATURE: 97 F | HEIGHT: 76 IN | SYSTOLIC BLOOD PRESSURE: 124 MMHG

## 2022-10-11 PROCEDURE — 99214 OFFICE O/P EST MOD 30 MIN: CPT

## 2022-10-11 NOTE — ADDENDUM
[FreeTextEntry1] : 3/19/2021: Reviewed echo. Limited study due to body habitus. Left ventricular function is preserved without regional wall motion abnormalities. EF is low normal at 50-55%. Plan as above, acceptable low CV risk for surgery. \par \par 5/21/2021: Reviewed dobutamine stress echo. No signs of ischemia. Patient low CV risk for bariatric surgery. 
Arabella Edwards; PhD)  Gastroenterology; Internal Medicine  [address] [place] [address]  Phone: [phone]  Follow Up Time:

## 2022-10-12 NOTE — ASSESSMENT
[FreeTextEntry1] : 43 yo M s/p laparoscopic sleeve gastrectomy and hiatal hernia repair. Feels good. Weight stable since last visit. Reports appetite suppression from phentermine and topiramate\par Doing well overall.\par \par Reviewed guidelines about nutrition and snacking - protein focus diet\par Encouraged better food choices - maintain food log\par Continue daily bariatric MVI \par Increase daily zero calorie liquid intake 64 oz/day\par Continue PT - exercise regimen incorporating cardio and strength training as tolerated\par Track steps - goal approximately 8-10k steps per day\par \par Labs ordered\par \par Return to office in 2 months\par Follow up with Nutritionist Dot Rosado as scheduled\par Follow up with Obesity Medicine as scheduled\par All questions answered\par \par Pt seen with Dr. Viveros\par \par Additional time spent before and after visit reviewing chart
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
## 2022-10-19 ENCOUNTER — APPOINTMENT (OUTPATIENT)
Dept: BARIATRICS/WEIGHT MGMT | Facility: CLINIC | Age: 42
End: 2022-10-19
Payer: COMMERCIAL

## 2022-10-19 PROCEDURE — 99213 OFFICE O/P EST LOW 20 MIN: CPT | Mod: 95

## 2022-10-20 NOTE — HISTORY OF PRESENT ILLNESS
[FreeTextEntry1] : This is a 42 year old male  being seen obesity followup visit.\par \par Patient's weight unchanged \par continues topiramate 100mg and phentermine 30mg, tolerating both well \par Cardio is limited due to back and ankle pain\par going to PT 3-4 days a week- 15 min strength training \par  \par Reports 2 meals most days plus ensure, sees RD\par  \par Sleep has improved, not wearing cpap nightly \par 4-5 hours of sleep- pain wakes him up

## 2022-11-15 ENCOUNTER — TRANSCRIPTION ENCOUNTER (OUTPATIENT)
Age: 42
End: 2022-11-15

## 2022-11-16 ENCOUNTER — APPOINTMENT (OUTPATIENT)
Dept: BARIATRICS/WEIGHT MGMT | Facility: CLINIC | Age: 42
End: 2022-11-16
Payer: COMMERCIAL

## 2022-11-16 VITALS — BODY MASS INDEX: 38.36 KG/M2 | HEIGHT: 76 IN | WEIGHT: 315 LBS

## 2022-11-16 PROCEDURE — 99213 OFFICE O/P EST LOW 20 MIN: CPT | Mod: 95
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
## 2022-11-30 ENCOUNTER — APPOINTMENT (OUTPATIENT)
Dept: ORTHOPEDIC SURGERY | Facility: CLINIC | Age: 42
End: 2022-11-30

## 2022-11-30 DIAGNOSIS — M17.11 UNILATERAL PRIMARY OSTEOARTHRITIS, RIGHT KNEE: ICD-10-CM

## 2022-11-30 DIAGNOSIS — M19.072 PRIMARY OSTEOARTHRITIS, LEFT ANKLE AND FOOT: ICD-10-CM

## 2022-11-30 PROCEDURE — 99213 OFFICE O/P EST LOW 20 MIN: CPT

## 2022-11-30 PROCEDURE — 99072 ADDL SUPL MATRL&STAF TM PHE: CPT
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
## 2022-12-05 ENCOUNTER — APPOINTMENT (OUTPATIENT)
Dept: BARIATRICS/WEIGHT MGMT | Facility: CLINIC | Age: 42
End: 2022-12-05
Payer: SELF-PAY

## 2022-12-05 VITALS — WEIGHT: 315 LBS | BODY MASS INDEX: 38.36 KG/M2 | HEIGHT: 76 IN

## 2022-12-05 PROCEDURE — 97803 MED NUTRITION INDIV SUBSEQ: CPT | Mod: 95

## 2022-12-13 ENCOUNTER — APPOINTMENT (OUTPATIENT)
Dept: BARIATRICS | Facility: CLINIC | Age: 42
End: 2022-12-13
Payer: COMMERCIAL

## 2022-12-13 VITALS
SYSTOLIC BLOOD PRESSURE: 120 MMHG | DIASTOLIC BLOOD PRESSURE: 80 MMHG | BODY MASS INDEX: 38.36 KG/M2 | HEART RATE: 90 BPM | OXYGEN SATURATION: 99 % | HEIGHT: 76 IN | WEIGHT: 315 LBS | TEMPERATURE: 96.7 F

## 2022-12-13 PROCEDURE — 99401 PREV MED CNSL INDIV APPRX 15: CPT | Mod: 25

## 2022-12-13 PROCEDURE — 99215 OFFICE O/P EST HI 40 MIN: CPT

## 2022-12-14 ENCOUNTER — TRANSCRIPTION ENCOUNTER (OUTPATIENT)
Age: 42
End: 2022-12-14
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
## 2022-12-15 NOTE — PHYSICAL EXAM
[Normal] : affect appropriate [de-identified] : soft, NT, ND, no diastasis or hernias appreciated, incisions well healed

## 2022-12-15 NOTE — ASSESSMENT
[FreeTextEntry1] : 43 yo M s/p laparoscopic sleeve gastrectomy and hiatal hernia repair. Presents today for follow up. Having chronic musculoskeletal pain saw Orthopedist last month and prescribed 3 new meds (meloxicam, gabapentin and muscle relaxant). Weight gain since last visit that pt attributes to lack of exercise. Reports appetite suppression from phentermine and topiramate, seeing Obesity Medicine 11/16/2022. Consuming 2 meals/day, skipping meals depending on work schedule. Reports reflux only with acidic foods. Last nutrition visit 12/5/2022, pt currently not meeting protein needs (76-98gm/day).\par \par Greater than 15 minutes spent with pt discussing importance of health maintenance principles including dietary and lifestyle changes as well as long-term weight maintenance\par Protein focus diet to 80g/day and 3 meals/day\par Encouraged better food choices - maintain food log\par Monitor weight weekly\par Continue daily bariatric MVI \par Increase daily zero calorie liquid intake 64 oz/day\par Continue PT - exercise regimen incorporating cardio and strength training as tolerated\par Track steps - goal approximately 8-10k steps per day\par Reviewed with pt may take meds prescribed by Ortho - meloxicam to be taken with omeprazole \par \par Labs ordered - to be done before next visit\par Return to office in 3 months\par Follow up with Nutritionist Dot Rosado as scheduled\par Follow up with Obesity Medicine as scheduled\par All questions answered\par \par Pt seen with Dr. Viveros\par \par Additional time spent before and after visit reviewing chart

## 2022-12-27 ENCOUNTER — APPOINTMENT (OUTPATIENT)
Dept: BARIATRICS/WEIGHT MGMT | Facility: CLINIC | Age: 42
End: 2022-12-27
Payer: COMMERCIAL

## 2022-12-27 PROCEDURE — 99213 OFFICE O/P EST LOW 20 MIN: CPT | Mod: 95

## 2022-12-27 NOTE — ASSESSMENT
[FreeTextEntry1] : BARIATRIC SURGERY HISTORY: gastric sleeve 5/2021\par OBESITY CO-MORBIDITIES: MADELYN, DM\par ANTI-OBESITY MEDICATIONS: phentermine, topiramate \par OBESITY MEDICATION SIDE EFFECTS: none\par \par \par \par Plan:\par continue phentermine 37.5mg po daily and topiramate 100mg\par keep food journal \par f/u with RD and surgery \par recommend whole food, lean animal protein \par prioritize self care\par remain properly hydrated \par cpap nightly \par encouraged physical activity if tolerated\par continue PT \par \par f/u 4-6 weeks
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
## 2023-01-09 ENCOUNTER — LABORATORY RESULT (OUTPATIENT)
Age: 43
End: 2023-01-09

## 2023-01-09 ENCOUNTER — APPOINTMENT (OUTPATIENT)
Dept: FAMILY MEDICINE | Facility: CLINIC | Age: 43
End: 2023-01-09
Payer: COMMERCIAL

## 2023-01-09 VITALS
HEIGHT: 76 IN | BODY MASS INDEX: 38.36 KG/M2 | OXYGEN SATURATION: 98 % | DIASTOLIC BLOOD PRESSURE: 76 MMHG | HEART RATE: 93 BPM | SYSTOLIC BLOOD PRESSURE: 114 MMHG | RESPIRATION RATE: 14 BRPM | TEMPERATURE: 98 F | WEIGHT: 315 LBS

## 2023-01-09 PROCEDURE — 99214 OFFICE O/P EST MOD 30 MIN: CPT | Mod: 25

## 2023-01-10 LAB
25(OH)D3 SERPL-MCNC: 35.6 NG/ML
ALBUMIN SERPL ELPH-MCNC: 4.3 G/DL
ALP BLD-CCNC: 80 U/L
ALT SERPL-CCNC: 25 U/L
ANION GAP SERPL CALC-SCNC: 10 MMOL/L
APPEARANCE: CLEAR
AST SERPL-CCNC: 19 U/L
BASOPHILS # BLD AUTO: 0.05 K/UL
BASOPHILS NFR BLD AUTO: 0.4 %
BILIRUB SERPL-MCNC: 0.4 MG/DL
BILIRUBIN URINE: NEGATIVE
BLOOD URINE: NEGATIVE
BUN SERPL-MCNC: 15 MG/DL
C PEPTIDE SERPL-MCNC: 2.8 NG/ML
CALCIUM SERPL-MCNC: 9.4 MG/DL
CHLORIDE SERPL-SCNC: 100 MMOL/L
CHOLEST SERPL-MCNC: 157 MG/DL
CO2 SERPL-SCNC: 27 MMOL/L
COLOR: NORMAL
COVID-19 NUCLEOCAPSID  GAM ANTIBODY INTERPRETATION: POSITIVE
COVID-19 SPIKE DOMAIN ANTIBODY INTERPRETATION: POSITIVE
CREAT SERPL-MCNC: 0.99 MG/DL
CREAT SPEC-SCNC: 92 MG/DL
EGFR: 99 ML/MIN/1.73M2
EOSINOPHIL # BLD AUTO: 0.09 K/UL
EOSINOPHIL NFR BLD AUTO: 0.8 %
ESTIMATED AVERAGE GLUCOSE: 120 MG/DL
FERRITIN SERPL-MCNC: 175 NG/ML
FOLATE SERPL-MCNC: >20 NG/ML
GLUCOSE QUALITATIVE U: NEGATIVE
GLUCOSE SERPL-MCNC: 77 MG/DL
HBA1C MFR BLD HPLC: 5.8 %
HCT VFR BLD CALC: 44 %
HDLC SERPL-MCNC: 39 MG/DL
HGB BLD-MCNC: 14 G/DL
IMM GRANULOCYTES NFR BLD AUTO: 0.4 %
IRON SATN MFR SERPL: 17 %
IRON SERPL-MCNC: 48 UG/DL
KETONES URINE: NEGATIVE
LDLC SERPL CALC-MCNC: 103 MG/DL
LEUKOCYTE ESTERASE URINE: ABNORMAL
LYMPHOCYTES # BLD AUTO: 2.58 K/UL
LYMPHOCYTES NFR BLD AUTO: 23 %
MAN DIFF?: NORMAL
MCHC RBC-ENTMCNC: 26.2 PG
MCHC RBC-ENTMCNC: 31.8 GM/DL
MCV RBC AUTO: 82.2 FL
MICROALBUMIN 24H UR DL<=1MG/L-MCNC: <1.2 MG/DL
MICROALBUMIN/CREAT 24H UR-RTO: NORMAL MG/G
MONOCYTES # BLD AUTO: 0.74 K/UL
MONOCYTES NFR BLD AUTO: 6.6 %
NEUTROPHILS # BLD AUTO: 7.73 K/UL
NEUTROPHILS NFR BLD AUTO: 68.8 %
NITRITE URINE: NEGATIVE
NONHDLC SERPL-MCNC: 118 MG/DL
PH URINE: 6
PLATELET # BLD AUTO: 267 K/UL
POTASSIUM SERPL-SCNC: 3.9 MMOL/L
PROT SERPL-MCNC: 7.9 G/DL
PROTEIN URINE: NEGATIVE
PSA SERPL-MCNC: 0.19 NG/ML
RBC # BLD: 5.35 M/UL
RBC # FLD: 14.7 %
SARS-COV-2 AB SERPL IA-ACNC: >250 U/ML
SARS-COV-2 AB SERPL QL IA: 34.4 INDEX
SODIUM SERPL-SCNC: 138 MMOL/L
SPECIFIC GRAVITY URINE: 1.01
T4 SERPL-MCNC: 6.7 UG/DL
TIBC SERPL-MCNC: 289 UG/DL
TRIGL SERPL-MCNC: 74 MG/DL
TSH SERPL-ACNC: 2.04 UIU/ML
UIBC SERPL-MCNC: 242 UG/DL
UROBILINOGEN URINE: NORMAL
VIT B12 SERPL-MCNC: 1215 PG/ML
WBC # FLD AUTO: 11.23 K/UL

## 2023-01-11 ENCOUNTER — RX RENEWAL (OUTPATIENT)
Age: 43
End: 2023-01-11

## 2023-01-12 LAB — URINE CULTURE <10: ABNORMAL

## 2023-01-13 ENCOUNTER — TRANSCRIPTION ENCOUNTER (OUTPATIENT)
Age: 43
End: 2023-01-13

## 2023-01-17 ENCOUNTER — APPOINTMENT (OUTPATIENT)
Dept: BARIATRICS/WEIGHT MGMT | Facility: CLINIC | Age: 43
End: 2023-01-17
Payer: COMMERCIAL

## 2023-01-17 PROCEDURE — 97803 MED NUTRITION INDIV SUBSEQ: CPT | Mod: 95

## 2023-01-18 VITALS — HEIGHT: 76 IN | BODY MASS INDEX: 38.36 KG/M2 | WEIGHT: 315 LBS

## 2023-01-23 ENCOUNTER — APPOINTMENT (OUTPATIENT)
Dept: FAMILY MEDICINE | Facility: CLINIC | Age: 43
End: 2023-01-23
Payer: COMMERCIAL

## 2023-01-23 VITALS
HEIGHT: 76 IN | OXYGEN SATURATION: 98 % | HEART RATE: 100 BPM | DIASTOLIC BLOOD PRESSURE: 84 MMHG | TEMPERATURE: 97.7 F | SYSTOLIC BLOOD PRESSURE: 130 MMHG | RESPIRATION RATE: 14 BRPM

## 2023-01-23 PROCEDURE — 99214 OFFICE O/P EST MOD 30 MIN: CPT

## 2023-02-09 ENCOUNTER — APPOINTMENT (OUTPATIENT)
Dept: BARIATRICS/WEIGHT MGMT | Facility: CLINIC | Age: 43
End: 2023-02-09
Payer: COMMERCIAL

## 2023-02-09 VITALS — WEIGHT: 315 LBS | BODY MASS INDEX: 38.36 KG/M2 | HEIGHT: 76 IN

## 2023-02-09 PROCEDURE — 99213 OFFICE O/P EST LOW 20 MIN: CPT | Mod: 95

## 2023-02-14 NOTE — ASSESSMENT
[FreeTextEntry1] : \par BARIATRIC SURGERY HISTORY: gastric sleeve 5/2021\par OBESITY CO-MORBIDITIES: MADELYN, DM\par ANTI-OBESITY MEDICATIONS: phentermine, topiramate \par OBESITY MEDICATION SIDE EFFECTS: none\par \par \par \par Plan:\par continue phentermine 37.5mg po daily and topiramate 100mg\par keep food journal \par f/u with RD and surgery \par recommend whole food, lean animal protein \par remain properly hydrated \par cpap nightly \par encouraged increased physical activity if tolerated- et a bike\par continue PT \par consider glp-1 or metformin\par \par f/u 4-6 weeks. \par \par \par \par

## 2023-02-14 NOTE — HISTORY OF PRESENT ILLNESS
[FreeTextEntry1] : This is a 42 year old male  being seen obesity followup visit. PMH: \par \par Patient weight up \par He has been taking phentermine 37.5mg\par Brings lunch to work with him daily \par L: 3 hardboiled eggs , 1 egg sandwich a week\par D: chicken and salad\par has not been eating beef \par occasional protein shake \par \par on average 6k steps \par goes to PT 2-3x/week\par chronic ankle pain \par \par plans to get a bike

## 2023-02-15 ENCOUNTER — TRANSCRIPTION ENCOUNTER (OUTPATIENT)
Age: 43
End: 2023-02-15

## 2023-02-21 ENCOUNTER — APPOINTMENT (OUTPATIENT)
Dept: BARIATRICS/WEIGHT MGMT | Facility: CLINIC | Age: 43
End: 2023-02-21
Payer: COMMERCIAL

## 2023-02-21 PROCEDURE — 97803 MED NUTRITION INDIV SUBSEQ: CPT | Mod: 95

## 2023-02-22 VITALS — WEIGHT: 315 LBS | BODY MASS INDEX: 38.36 KG/M2 | HEIGHT: 76 IN

## 2023-03-14 ENCOUNTER — APPOINTMENT (OUTPATIENT)
Dept: BARIATRICS | Facility: CLINIC | Age: 43
End: 2023-03-14
Payer: COMMERCIAL

## 2023-03-14 VITALS
BODY MASS INDEX: 38.36 KG/M2 | DIASTOLIC BLOOD PRESSURE: 80 MMHG | HEIGHT: 76 IN | OXYGEN SATURATION: 96 % | WEIGHT: 315 LBS | HEART RATE: 90 BPM | SYSTOLIC BLOOD PRESSURE: 130 MMHG | TEMPERATURE: 97.1 F

## 2023-03-14 PROCEDURE — 99214 OFFICE O/P EST MOD 30 MIN: CPT

## 2023-03-15 ENCOUNTER — TRANSCRIPTION ENCOUNTER (OUTPATIENT)
Age: 43
End: 2023-03-15

## 2023-03-20 ENCOUNTER — APPOINTMENT (OUTPATIENT)
Dept: FAMILY MEDICINE | Facility: CLINIC | Age: 43
End: 2023-03-20
Payer: COMMERCIAL

## 2023-03-20 VITALS
OXYGEN SATURATION: 98 % | DIASTOLIC BLOOD PRESSURE: 72 MMHG | RESPIRATION RATE: 14 BRPM | SYSTOLIC BLOOD PRESSURE: 132 MMHG | TEMPERATURE: 97.4 F | HEIGHT: 76 IN | BODY MASS INDEX: 38.36 KG/M2 | HEART RATE: 83 BPM | WEIGHT: 315 LBS

## 2023-03-20 DIAGNOSIS — H10.9 UNSPECIFIED CONJUNCTIVITIS: ICD-10-CM

## 2023-03-20 PROCEDURE — 99214 OFFICE O/P EST MOD 30 MIN: CPT
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
## 2023-03-25 NOTE — PHYSICAL EXAM
[Normal] : affect appropriate [de-identified] : soft, NT, ND, no evidence of hernia or diastasis, incisions well-healed
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
## 2023-03-29 ENCOUNTER — APPOINTMENT (OUTPATIENT)
Dept: BARIATRICS/WEIGHT MGMT | Facility: CLINIC | Age: 43
End: 2023-03-29
Payer: COMMERCIAL

## 2023-03-29 PROCEDURE — 99213 OFFICE O/P EST LOW 20 MIN: CPT | Mod: 95

## 2023-03-31 NOTE — ASSESSMENT
[FreeTextEntry1] : \par BARIATRIC SURGERY HISTORY: gastric sleeve 5/2021\par OBESITY CO-MORBIDITIES: MADELYN, DM\par ANTI-OBESITY MEDICATIONS: phentermine, topiramate \par OBESITY MEDICATION SIDE EFFECTS: none\par \par \par \par Plan:\par continue phentermine 37.5mg po daily and topiramate 100mg\par keep food journal \par f/u with RD and surgery \par recommend whole food, lean animal protein \par remain properly hydrated \par cpap nightly \par encouraged increased physical activity if tolerated- et a bike\par continue PT \par restart trulicity 0.75mg qweekly \par \par f/u 4-6 weeks. \par \par \par \par

## 2023-03-31 NOTE — HISTORY OF PRESENT ILLNESS
[FreeTextEntry1] : This is a 42 year old male  being seen obesity followup visit. PMH: \par \par Patient weight at home 456 pounds \par \par He has been taking phentermine 37.5mg and topiramate 100mg\par Seeing RD \par Recently saw Dr Savage\par \par Exercise is limited \par on average 6k steps \par goes to PT 2-3x/week\par chronic ankle pain \par \par He is going to [place] next week

## 2023-04-12 ENCOUNTER — TRANSCRIPTION ENCOUNTER (OUTPATIENT)
Age: 43
End: 2023-04-12

## 2023-04-13 NOTE — PRE-OP CHECKLIST - TEMPERATURE IN CELSIUS (DEGREES C)
36.9 Protopic Counseling: Patient may experience a mild burning sensation during topical application. Protopic is not approved in children less than 2 years of age. There have been case reports of hematologic and skin malignancies in patients using topical calcineurin inhibitors although causality is questionable.

## 2023-04-18 ENCOUNTER — APPOINTMENT (OUTPATIENT)
Dept: BARIATRICS/WEIGHT MGMT | Facility: CLINIC | Age: 43
End: 2023-04-18
Payer: COMMERCIAL

## 2023-04-18 PROCEDURE — 97803 MED NUTRITION INDIV SUBSEQ: CPT | Mod: 95

## 2023-04-19 VITALS — HEIGHT: 76 IN | WEIGHT: 315 LBS | BODY MASS INDEX: 38.36 KG/M2

## 2023-05-12 RX ORDER — OMEPRAZOLE 20 MG/1
20 CAPSULE, DELAYED RELEASE ORAL DAILY
Qty: 30 | Refills: 3 | Status: DISCONTINUED | COMMUNITY
Start: 2023-01-03 | End: 2023-05-12

## 2023-05-15 ENCOUNTER — APPOINTMENT (OUTPATIENT)
Dept: BARIATRICS/WEIGHT MGMT | Facility: CLINIC | Age: 43
End: 2023-05-15

## 2023-05-16 ENCOUNTER — RX RENEWAL (OUTPATIENT)
Age: 43
End: 2023-05-16

## 2023-05-16 ENCOUNTER — TRANSCRIPTION ENCOUNTER (OUTPATIENT)
Age: 43
End: 2023-05-16

## 2023-05-17 ENCOUNTER — APPOINTMENT (OUTPATIENT)
Dept: BARIATRICS/WEIGHT MGMT | Facility: CLINIC | Age: 43
End: 2023-05-17
Payer: COMMERCIAL

## 2023-05-17 VITALS — BODY MASS INDEX: 38.36 KG/M2 | HEIGHT: 76 IN | WEIGHT: 315 LBS

## 2023-05-17 PROCEDURE — 99213 OFFICE O/P EST LOW 20 MIN: CPT | Mod: 95

## 2023-05-17 NOTE — ASSESSMENT
[FreeTextEntry1] : \par BARIATRIC SURGERY HISTORY: gastric sleeve 5/2021\par OBESITY CO-MORBIDITIES: MADELYN, DM\par ANTI-OBESITY MEDICATIONS: phentermine, topiramate \par OBESITY MEDICATION SIDE EFFECTS: none\par \par \par \par Plan:\par continue phentermine 37.5mg po daily and topiramate 100mg\par increase trulicity to 1.5mg qweekly\par f/u with RD and surgery \par cpap nightly \par encouraged increased physical activity if tolerated\par continue PT  \par advised to hold trulicity 1 week prior to wrist surgery \par \par f/u 4-6 weeks. \par \par \par \par

## 2023-05-17 NOTE — HISTORY OF PRESENT ILLNESS
[FreeTextEntry1] : This is a 42 year old male  being seen obesity followup visit. PMH: \par \par Patient weight down 7 pounds\par \par Started trulicity 0.75mg, tolerarting well\par He has been taking phentermine 37.5mg and topiramate 100mg\par \par chronic ankle pain but improved, active throughout the day. 6k steps on average\par Requires wrist surgery, will be seeing ortho this week \par \par

## 2023-05-30 ENCOUNTER — APPOINTMENT (OUTPATIENT)
Dept: BARIATRICS | Facility: CLINIC | Age: 43
End: 2023-05-30
Payer: COMMERCIAL

## 2023-05-30 VITALS
HEIGHT: 76 IN | SYSTOLIC BLOOD PRESSURE: 130 MMHG | DIASTOLIC BLOOD PRESSURE: 74 MMHG | BODY MASS INDEX: 38.36 KG/M2 | HEART RATE: 82 BPM | TEMPERATURE: 96.7 F | WEIGHT: 315 LBS | OXYGEN SATURATION: 99 %

## 2023-05-30 PROCEDURE — 99214 OFFICE O/P EST MOD 30 MIN: CPT

## 2023-05-30 NOTE — REVIEW OF SYSTEMS
Kenia is a 72 year old  female who presents for annual exam.     Besides routine health maintenance, she has no other health concerns today.    Do you have a Health Care Directive?: No: Advance care planning was reviewed with patient; patient declined at this time.    Fall risk:   Fallen 2 or more times in the past year?: No  Any fall with injury in the past year?: No    HPI:  The patient's PCP is [facility].      Doing ok  Fracture tibia this years on hiking trip      GYNECOLOGIC HISTORY:  No LMP recorded. Patient is postmenopausal..   reports that she has never smoked. She has never used smokeless tobacco.    Patient is sexually active.  STD testing offered?  Declined  Last PHQ-9 score on record=   PHQ-9 SCORE 2017   Total Score 2     Last GAD7 score on record=   NEELA-7 SCORE 2016   Total Score 0 1     Alcohol Score = 4    HEALTH MAINTENANCE:  Cholesterol: (No results found for: CHOL   Last Mammo: 17, Result: normal, Next Mammo: today   Pap: No longer needs due to age  DEXA: 17 in Care Everywhere  Colonoscopy: 12/16/15, Result:  normal, Next Colonoscopy: Unsure of when to RTC for screening    Health maintenance updated:  yes    HISTORY:  Obstetric History       T2      L2     SAB0   TAB0   Ectopic0   Multiple0   Live Births0       # Outcome Date GA Lbr [place]/2nd Weight Sex Delivery Anes PTL Lv   2 Term      CS-Unspec      1 Term      CS-Unspec           Patient Active Problem List   Diagnosis     fsocLUMBAR DISC DISPLACEMENT     Nonallopathic lesion of lumbar region     Nonallopathic lesion of thoracic region     Adhesive capsulitis     Past Surgical History:   Procedure Laterality Date     c-sections      X2     ORTHOPEDIC SURGERY  2004    risotomy, disectomy      Social History   Substance Use Topics     Smoking status: Never Smoker     Smokeless tobacco: Never Used     Alcohol use 0.0 oz/week     0 Standard drinks or equivalent per week  "     Comment: 1 daily       Problem (# of Occurrences) Relation (Name,Age of Onset)    Breast Cancer (1) Paternal Grandmother            Current Outpatient Prescriptions   Medication Sig     esomeprazole (NEXIUM) 20 MG capsule Take 1 capsule (20 mg) by mouth every morning (before breakfast) Take 30-60 minutes before eating.     Esomeprazole Magnesium (NEXIUM PO)      estradiol (VIVELLE-DOT) 0.0375 MG/24HR BIW patch Place 1 patch onto the skin twice a week     medroxyPROGESTERone (PROVERA) 5 MG tablet Take 1 tablet (5 mg) by mouth daily     valACYclovir (VALTREX) 500 MG tablet Take 500 mg by mouth 2 times daily Reported on 5/8/2017     No current facility-administered medications for this visit.        Allergies   Allergen Reactions     Penicillins Hives       Past medical, surgical, social and family history were reviewed and updated in EPIC.    ROS:   12 point review of systems negative other than symptoms noted below.    EXAM:  /66  Pulse 78  Ht 5' 6\" (1.676 m)  Wt 177 lb 3.2 oz (80.4 kg)  BMI 28.6 kg/m2   BMI: Body mass index is 28.6 kg/(m^2).    EXAM:  Constitutional: Appearance: Well nourished, well developed alert, in no acute distress  Neck:  Lymph Nodes:  No lymphadenopathy present    Thyroid:  Gland size normal, nontender, no nodules or masses present  on palpation  Chest:  Respiratory Effort:  Breathing unlabored  Cardiovascular:Heart    Auscultation:  Regular rate, normal rhythm, no murmurs present  Breasts: Inspection of Breasts:  No lymphadenopathy present., Palpation of Breasts and Axillae:  No masses present on palpation, no breast tenderness., Axillary Lymph Nodes:  No lymphadenopathy present. and No nodularity, asymmetry or nipple discharge bilaterally.  Gastrointestinal:  Abdominal Examination:  Abdomen nontender to palpation, tone normal without     rigidity or guarding, no masses present, umbilicus without lesions    Liver and speen:  No hepatomegaly present, liver nontender to " palpation    Hernias:  No hernias present  Lymphatic: Lymph Nodes:  No other lymphadenopathy present  Skin:  General Inspection:  No rashes present, no lesions present, no areas of  discoloration.    Genitalia and Groin:  No rashes present, no lesions present, no areas of  discoloration, no masses present  Neurologic/Psychiatric:    Mental Status:  Oriented X3     Pelvic Exam:  External Genitalia:     Normal appearance for age, no discharge present, no tenderness present, no inflammatory lesions present, color normal  Vagina:     Normal vaginal vault without central or paravaginal defects, no discharge present, no inflammatory lesions present, no masses present  Bladder:     Nontender to palpation  Urethra:   Urethral Body:  Urethra palpation normal, urethra structural support normal   Urethral Meatus:  No erythema or lesions present  Cervix:     Appearance healthy, no lesions present, nontender to palpation, no bleeding present  Uterus:     Uterus: firm, normal sized and nontender, midplane in position.   Adnexa:     No adnexal tenderness present, no adnexal masses present  Perineum:     Perineum within normal limits, no evidence of trauma, no rashes or skin lesions present  Anus:     Anus within normal limits, no hemorrhoids present  Inguinal Lymph Nodes:     No lymphadenopathy present  Pubic Hair:     Normal pubic hair distribution for age  Genitalia and Groin:     No rashes present, no lesions present, no areas of discoloration, no masses present      COUNSELING:   Reviewed preventive health counseling, as reflected in patient instructions       Regular exercise       Healthy diet/nutrition    BMI:  Body mass index is 28.6 kg/(m^2).  Weight management plan: Patient was referred to their PCP to discuss a diet and exercise plan.   reports that she has never smoked. She has never used smokeless tobacco.      ASSESSMENT:  72 year old female with satisfactory annual exam.    ICD-10-CM    1. Encounter for  gynecological examination without abnormal finding Z01.419    2. Hormone replacement therapy (postmenopausal) Z79.890    3. Post-menopause on HRT (hormone replacement therapy) Z79.890        PLAN:  Refilled hormone replacement therapy, doesn't want to stop  Had bone density checked already      Ivett Cardenas MD         [Patient Intake Form Reviewed] : Patient intake form was reviewed [Negative] : Allergic/Immunologic

## 2023-06-06 ENCOUNTER — APPOINTMENT (OUTPATIENT)
Dept: BARIATRICS/WEIGHT MGMT | Facility: CLINIC | Age: 43
End: 2023-06-06
Payer: COMMERCIAL

## 2023-06-06 VITALS — HEIGHT: 76 IN | BODY MASS INDEX: 38.36 KG/M2 | WEIGHT: 315 LBS

## 2023-06-06 PROCEDURE — 97803 MED NUTRITION INDIV SUBSEQ: CPT | Mod: 95
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
## 2023-06-08 NOTE — ASSESSMENT
[FreeTextEntry1] : 42 year man s/p Laparoscopic Sleeve Gastrectomy with hiatal hernia repair.  Patient taking phentermine and topiramate prescribed by obesity medicine, reports these medications are helping "a little bit".Reports no abdominal pain, nausea/vomiting, constipation, diarrhea, reflux/heartburn.\par \par \par Last labs done by PCP on 1/9/2023. Results discussed with patient.\par \par Continue 3 protein-focused meals/day (aim for 60 g - 70 g protein/day)\par Encourage zero calorie fluid intake (64 oz/day)\par Continue bariatric vitamins \par Exercise with cardio (aim for 8 K- 10 K steps/day), and strength training 2 x/week\par Use step counter\par Weigh yourself 1 x - 2 x/week\par Follow-up with nutritionist \par Follow-up with obesity medicine\par Follow-up in 3 months\par \par All questions answered\par \par Call with any questions or concerns\par \par Time before and after visit spent reviewing chart\par

## 2023-06-08 NOTE — PHYSICAL EXAM
[Obese, well nourished, in no acute distress] : obese, well nourished, in no acute distress [Normal] : affect appropriate [de-identified] : soft, NT, ND, no evidence of hernia or diastasis, incisions well-healed

## 2023-06-14 ENCOUNTER — APPOINTMENT (OUTPATIENT)
Dept: BARIATRICS/WEIGHT MGMT | Facility: CLINIC | Age: 43
End: 2023-06-14
Payer: COMMERCIAL

## 2023-06-14 PROCEDURE — 99213 OFFICE O/P EST LOW 20 MIN: CPT | Mod: 95

## 2023-06-18 NOTE — ASSESSMENT
[FreeTextEntry1] : \par BARIATRIC SURGERY HISTORY: gastric sleeve 5/2021, lifetime max = 595 lbs, pre-op 495 lbs\par OBESITY CO-MORBIDITIES: MADELYN, DM\par ANTI-OBESITY MEDICATIONS: phentermine, topiramate \par OBESITY MEDICATION SIDE EFFECTS: none\par \par \par \par Plan:\par continue phentermine 37.5mg po daily and topiramate 100mg\par increase trulicity to 3mg, likely to benefit from max dose of 4.5 mg \par cont to work with RD\par cont regular cpap use \par encouraged increased physical activity if tolerated\par \par \par \par f/u 6 weeks. \par \par \par \par

## 2023-06-18 NOTE — HISTORY OF PRESENT ILLNESS
[FreeTextEntry1] : 41 yo man returns for obesity medicine f/u\par \par weight now down to 445 lbs (lifetime max = 595 lbs)\par \par has been tolerating trulicity 1.5mg.  thinks its effects are modest\par He has been taking phentermine 37.5mg and topiramate 100mg\par has remained relatively active\par feels that eating is unchanged but under decent control\par continues to work with RD\par no complications from VSG\par \par

## 2023-07-06 ENCOUNTER — APPOINTMENT (OUTPATIENT)
Dept: FAMILY MEDICINE | Facility: CLINIC | Age: 43
End: 2023-07-06
Payer: COMMERCIAL

## 2023-07-06 VITALS
RESPIRATION RATE: 14 BRPM | SYSTOLIC BLOOD PRESSURE: 122 MMHG | HEART RATE: 104 BPM | HEIGHT: 76 IN | OXYGEN SATURATION: 98 % | DIASTOLIC BLOOD PRESSURE: 80 MMHG | WEIGHT: 315 LBS | BODY MASS INDEX: 38.36 KG/M2 | TEMPERATURE: 97 F

## 2023-07-06 DIAGNOSIS — Z87.898 PERSONAL HISTORY OF OTHER SPECIFIED CONDITIONS: ICD-10-CM

## 2023-07-06 DIAGNOSIS — Z87.09 PERSONAL HISTORY OF OTHER DISEASES OF THE RESPIRATORY SYSTEM: ICD-10-CM

## 2023-07-06 DIAGNOSIS — M25.872 OTHER SPECIFIED JOINT DISORDERS, LEFT ANKLE AND FOOT: ICD-10-CM

## 2023-07-06 DIAGNOSIS — M25.531 PAIN IN RIGHT WRIST: ICD-10-CM

## 2023-07-06 DIAGNOSIS — Z78.9 OTHER SPECIFIED HEALTH STATUS: ICD-10-CM

## 2023-07-06 PROCEDURE — 99214 OFFICE O/P EST MOD 30 MIN: CPT | Mod: 25

## 2023-07-06 RX ORDER — AZITHROMYCIN 250 MG/1
250 TABLET, FILM COATED ORAL
Qty: 1 | Refills: 2 | Status: COMPLETED | COMMUNITY
Start: 2023-03-20 | End: 2023-07-06

## 2023-07-06 RX ORDER — DULAGLUTIDE 1.5 MG/.5ML
1.5 INJECTION, SOLUTION SUBCUTANEOUS
Qty: 4 | Refills: 3 | Status: COMPLETED | COMMUNITY
Start: 2023-03-29 | End: 2023-07-06

## 2023-07-07 LAB
25(OH)D3 SERPL-MCNC: 35.6 NG/ML
ALBUMIN SERPL ELPH-MCNC: 4.2 G/DL
ALP BLD-CCNC: 74 U/L
ALT SERPL-CCNC: 23 U/L
ANION GAP SERPL CALC-SCNC: 11 MMOL/L
APPEARANCE: CLEAR
AST SERPL-CCNC: 21 U/L
BILIRUB SERPL-MCNC: 0.3 MG/DL
BILIRUBIN URINE: NEGATIVE
BLOOD URINE: NEGATIVE
BUN SERPL-MCNC: 16 MG/DL
C PEPTIDE SERPL-MCNC: 3.6 NG/ML
CALCIUM SERPL-MCNC: 8.9 MG/DL
CHLORIDE SERPL-SCNC: 105 MMOL/L
CHOLEST SERPL-MCNC: 151 MG/DL
CO2 SERPL-SCNC: 24 MMOL/L
COLOR: YELLOW
COVID-19 NUCLEOCAPSID  GAM ANTIBODY INTERPRETATION: POSITIVE
COVID-19 SPIKE DOMAIN ANTIBODY INTERPRETATION: POSITIVE
CREAT SERPL-MCNC: 1.08 MG/DL
CREAT SPEC-SCNC: 305 MG/DL
EGFR: 89 ML/MIN/1.73M2
ESTIMATED AVERAGE GLUCOSE: 120 MG/DL
FERRITIN SERPL-MCNC: 144 NG/ML
FOLATE SERPL-MCNC: 16.4 NG/ML
GLUCOSE QUALITATIVE U: NEGATIVE MG/DL
GLUCOSE SERPL-MCNC: 86 MG/DL
HBA1C MFR BLD HPLC: 5.8 %
HDLC SERPL-MCNC: 36 MG/DL
IRON SATN MFR SERPL: 16 %
IRON SERPL-MCNC: 43 UG/DL
KETONES URINE: NEGATIVE MG/DL
LDLC SERPL CALC-MCNC: 101 MG/DL
LEUKOCYTE ESTERASE URINE: NEGATIVE
MICROALBUMIN 24H UR DL<=1MG/L-MCNC: 2.1 MG/DL
MICROALBUMIN/CREAT 24H UR-RTO: 7 MG/G
NITRITE URINE: NEGATIVE
NONHDLC SERPL-MCNC: 115 MG/DL
PH URINE: 6
POTASSIUM SERPL-SCNC: 4.1 MMOL/L
PROT SERPL-MCNC: 7.7 G/DL
PROTEIN URINE: NORMAL MG/DL
PSA SERPL-MCNC: 0.21 NG/ML
SARS-COV-2 AB SERPL IA-ACNC: >250 U/ML
SARS-COV-2 AB SERPL QL IA: 9.3 INDEX
SODIUM SERPL-SCNC: 140 MMOL/L
SPECIFIC GRAVITY URINE: 1.03
T4 SERPL-MCNC: 7.2 UG/DL
TIBC SERPL-MCNC: 262 UG/DL
TRIGL SERPL-MCNC: 72 MG/DL
TSH SERPL-ACNC: 1.35 UIU/ML
UIBC SERPL-MCNC: 219 UG/DL
URATE SERPL-MCNC: 6.2 MG/DL
UROBILINOGEN URINE: 0.2 MG/DL
VIT B12 SERPL-MCNC: 1083 PG/ML

## 2023-07-10 LAB — URINE CULTURE <10: ABNORMAL

## 2023-08-08 ENCOUNTER — APPOINTMENT (OUTPATIENT)
Dept: FAMILY MEDICINE | Facility: CLINIC | Age: 43
End: 2023-08-08

## 2023-08-10 ENCOUNTER — APPOINTMENT (OUTPATIENT)
Dept: FAMILY MEDICINE | Facility: CLINIC | Age: 43
End: 2023-08-10
Payer: COMMERCIAL

## 2023-08-10 VITALS
WEIGHT: 315 LBS | BODY MASS INDEX: 38.36 KG/M2 | OXYGEN SATURATION: 98 % | HEIGHT: 76 IN | DIASTOLIC BLOOD PRESSURE: 74 MMHG | TEMPERATURE: 98 F | SYSTOLIC BLOOD PRESSURE: 130 MMHG | HEART RATE: 96 BPM | RESPIRATION RATE: 14 BRPM

## 2023-08-10 PROCEDURE — 99213 OFFICE O/P EST LOW 20 MIN: CPT

## 2023-08-10 NOTE — REVIEW OF SYSTEMS
[Patient Intake Form Reviewed] : Patient intake form was reviewed [Fatigue] : fatigue [Discharge] : discharge [Redness] : redness [Negative] : Heme/Lymph [Pain] : no pain [Vision Problems] : no vision problems [Itching] : no itching [Earache] : no earache [Hearing Loss] : no hearing loss [Nosebleeds] : no nosebleeds [Postnasal Drip] : no postnasal drip [Nasal Discharge] : no nasal discharge [Sore Throat] : no sore throat [Hoarseness] : no hoarseness [FreeTextEntry2] : Overweight [FreeTextEntry4] :  congestion [FreeTextEntry5] : RISHI [FreeTextEntry1] : Low Vit. D, DM

## 2023-08-10 NOTE — COUNSELING
[Weight management counseling provided] : Weight management [Healthy eating counseling provided] : healthy eating [Activity counseling provided] : activity [Behavioral health counseling provided] : behavioral health  [Fall prevention counseling provided] : fall prevention  [Good understanding] : Patient has a good understanding of disease, goals and obesity follow-up plan [Weigh Self Once Weekly] : Weigh self once weekly [Decrease Portions] : Decrease food portions [Walking] : Walking [Quit Smoking] : Quit Smoking [Sleep ___ hours/day] : Sleep [unfilled] hours/day [Engage in a relaxing activity] : Engage in a relaxing activity [Plan in advance] : Plan in advance [Adequate lighting] : Adequate lighting [No throw rugs] : No throw rugs [Use proper foot wear] : Use proper foot wear [None] : None

## 2023-08-10 NOTE — PHYSICAL EXAM
[Well Nourished] : well nourished [Well Developed] : well developed [Well-Appearing] : well-appearing [PERRL] : pupils equal round and reactive to light [EOMI] : extraocular movements intact [Normal Outer Ear/Nose] : the outer ears and nose were normal in appearance [Normal Oropharynx] : the oropharynx was normal [No JVD] : no jugular venous distention [No Lymphadenopathy] : no lymphadenopathy [Supple] : supple [Thyroid Normal, No Nodules] : the thyroid was normal and there were no nodules present [No Respiratory Distress] : no respiratory distress  [No Accessory Muscle Use] : no accessory muscle use [Clear to Auscultation] : lungs were clear to auscultation bilaterally [Normal Rate] : normal rate  [Regular Rhythm] : with a regular rhythm [Normal S1, S2] : normal S1 and S2 [No Murmur] : no murmur heard [No Carotid Bruits] : no carotid bruits [No Abdominal Bruit] : a ~M bruit was not heard ~T in the abdomen [No Varicosities] : no varicosities [Pedal Pulses Present] : the pedal pulses are present [No Edema] : there was no peripheral edema [No Palpable Aorta] : no palpable aorta [No Extremity Clubbing/Cyanosis] : no extremity clubbing/cyanosis [Soft] : abdomen soft [Non Tender] : non-tender [Non-distended] : non-distended [No Masses] : no abdominal mass palpated [No HSM] : no HSM [Normal Bowel Sounds] : normal bowel sounds [No CVA Tenderness] : no CVA  tenderness [No Spinal Tenderness] : no spinal tenderness [No Joint Swelling] : no joint swelling [Grossly Normal Strength/Tone] : grossly normal strength/tone [No Rash] : no rash [Coordination Grossly Intact] : coordination grossly intact [No Focal Deficits] : no focal deficits [Normal Gait] : normal gait [Deep Tendon Reflexes (DTR)] : deep tendon reflexes were 2+ and symmetric [___/1] : [unfilled]/1   [___/3] : [unfilled]/3 [___/5] : [unfilled]/5 [___/4] : [unfilled]/4 [___/2] : [unfilled]/2 [___/8] : [unfilled]/8 [Normal] : Normal [Normal Affect] : the affect was normal [Normal Insight/Judgement] : insight and judgment were intact [Comprehensive Foot Exam Normal] : Right and left foot were examined and both feet are normal. No ulcers in either foot. Toes are normal and with full ROM.  Normal tactile sensation with monofilament testing throughout both feet [de-identified] : Overweight [TextBox_2] : Yes [TextBox_4] : HS [SlumsTotal] : 30

## 2023-08-10 NOTE — ASSESSMENT
[Patient Optimized for Surgery] : Patient optimized for surgery [FreeTextEntry4] : Medical Clearance

## 2023-08-10 NOTE — HISTORY OF PRESENT ILLNESS
[No Pertinent Cardiac History] : no history of aortic stenosis, atrial fibrillation, coronary artery disease, recent myocardial infarction, or implantable device/pacemaker [No Pertinent Pulmonary History] : no history of asthma, COPD, sleep apnea, or smoking [Sleep Apnea] : sleep apnea [No Adverse Anesthesia Reaction] : no adverse anesthesia reaction in self or family member [Diabetes] : diabetes [(Patient denies any chest pain, claudication, dyspnea on exertion, orthopnea, palpitations or syncope)] : Patient denies any chest pain, claudication, dyspnea on exertion, orthopnea, palpitations or syncope [Aortic Stenosis] : no aortic stenosis [Atrial Fibrillation] : no atrial fibrillation [Coronary Artery Disease] : no coronary artery disease [Recent Myocardial Infarction] : no recent myocardial infarction [Implantable Device/Pacemaker] : no implantable device/pacemaker [Asthma] : no asthma [COPD] : no COPD [Smoker] : not a smoker [Family Member] : no family member with adverse anesthesia reaction/sudden death [Self] : no previous adverse anesthesia reaction [Chronic Anticoagulation] : no chronic anticoagulation [Chronic Kidney Disease] : no chronic kidney disease [FreeTextEntry1] : Right Wrist Surgery [FreeTextEntry2] : 8-15-23 [FreeTextEntry3] : Dr. Vaughn Jose [FreeTextEntry4] : Pt. Requires Medical Clearance for Surgery

## 2023-08-22 ENCOUNTER — APPOINTMENT (OUTPATIENT)
Dept: BARIATRICS | Facility: CLINIC | Age: 43
End: 2023-08-22
Payer: COMMERCIAL

## 2023-08-22 VITALS
TEMPERATURE: 96.7 F | DIASTOLIC BLOOD PRESSURE: 76 MMHG | SYSTOLIC BLOOD PRESSURE: 130 MMHG | HEIGHT: 76 IN | WEIGHT: 315 LBS | BODY MASS INDEX: 38.36 KG/M2

## 2023-08-22 DIAGNOSIS — Z90.3 POSTSURGICAL MALABSORPTION, NOT ELSEWHERE CLASSIFIED: ICD-10-CM

## 2023-08-22 DIAGNOSIS — K91.2 POSTSURGICAL MALABSORPTION, NOT ELSEWHERE CLASSIFIED: ICD-10-CM

## 2023-08-22 PROCEDURE — 99214 OFFICE O/P EST MOD 30 MIN: CPT

## 2023-08-22 RX ORDER — GABAPENTIN 400 MG/1
400 CAPSULE ORAL 3 TIMES DAILY
Refills: 0 | Status: DISCONTINUED | COMMUNITY
End: 2023-08-22

## 2023-08-22 RX ORDER — MELOXICAM 15 MG/1
15 TABLET ORAL DAILY
Refills: 0 | Status: DISCONTINUED | COMMUNITY
End: 2023-08-22

## 2023-08-22 RX ORDER — METHOCARBAMOL 750 MG/1
750 TABLET, FILM COATED ORAL DAILY
Refills: 0 | Status: DISCONTINUED | COMMUNITY
End: 2023-08-22

## 2023-08-22 NOTE — HISTORY OF PRESENT ILLNESS
[Procedure: ___] : Procedure performed: [unfilled]  [Date of Surgery: ___] : Date of Surgery:   [unfilled] [Surgeon Name:   ___] : Surgeon Name: Dr. NAIDU [Pre-Op Weight ___] : Pre-op weight was [unfilled] lbs [de-identified] : 43 year old M is s/p Laparoscopic Sleeve Gastrectomy with hiatal hernia repair. Presents today for routine follow up, had R wrist arthroscopy 1 week ago and since resumed bariatric vitamins and phentermine/topiramate this week. Weight loss since last visit. Consuming an adequate protein intake with 2-3 meals/day and taking 20-30min to consume a meal. Saw nutritionist and Obesity Medicine in June, continues to take phentermine and topiramate. Drinking adequate zero calorie liquid, 36oz/day. Ambulating daily for exercise. Sleeping with CPAP at night. No abdominal pain, reflux, nausea, vomiting, constipation or diarrhea.

## 2023-08-22 NOTE — PHYSICAL EXAM
[Obese, well nourished, in no acute distress] : obese, well nourished, in no acute distress [Normal] : affect appropriate [de-identified] : Well, healthy appearing adult  [de-identified] : EOMI, no conjunctival injection, anicteric  [de-identified] : No visualized JVD or audible bruits  [de-identified] : Equal chest rise, non-labored respirations, no audible wheezing  [de-identified] : Regular rate, no audible carotid bruits or JVD appreciated  [de-identified] : soft, NT, ND, no diastasis or hernias appreciated, incisions well healed  [de-identified] : [place] except for R forearm in a splint and sling

## 2023-08-22 NOTE — ASSESSMENT
[FreeTextEntry1] : 43 year old M is s/p Laparoscopic Sleeve Gastrectomy with hiatal hernia repair. Presents today for routine follow up, had R wrist arthroscopy 1 week ago and since resumed bariatric vitamins and phentermine/topiramate this week. Weight loss since last visit.  Doing well overall.  Reviewed guidelines about nutrition and snacking - protein focus diet with 3 meals/day Encouraged better food choices - maintain food log Continue daily Bariatric MVI  Increase daily zero calorie liquid intake goal of 64 oz/day Encouraged to participate in a daily exercise regimen incorporating cardio  Labs performed 7/6/2023 done with PCP - results reviewed: iron 43, HbA1c 5.8%, vitamin levels normal but no vitamin A/B1/Zn levels done Labs for vitamin A/B1/Zn levels ordered to be done before next visit  Return to office in 3 months Follow up with nutritionist tomorrow Follow up with Obesity Medicine as scheduled Follow up with PCP for continuity of care Follow up with Orthopaedics All questions answered  Pt seen with Dr. Viveros   Additional time spent before and after visit reviewing chart

## 2023-08-23 ENCOUNTER — TRANSCRIPTION ENCOUNTER (OUTPATIENT)
Age: 43
End: 2023-08-23

## 2023-08-23 ENCOUNTER — APPOINTMENT (OUTPATIENT)
Dept: BARIATRICS/WEIGHT MGMT | Facility: CLINIC | Age: 43
End: 2023-08-23
Payer: COMMERCIAL

## 2023-08-23 VITALS — HEIGHT: 76 IN | WEIGHT: 315 LBS | BODY MASS INDEX: 38.36 KG/M2

## 2023-08-23 PROCEDURE — 97803 MED NUTRITION INDIV SUBSEQ: CPT | Mod: 95

## 2023-09-13 ENCOUNTER — APPOINTMENT (OUTPATIENT)
Dept: BARIATRICS/WEIGHT MGMT | Facility: CLINIC | Age: 43
End: 2023-09-13
Payer: SELF-PAY

## 2023-09-13 PROCEDURE — 99213 OFFICE O/P EST LOW 20 MIN: CPT | Mod: 95

## 2023-09-14 ENCOUNTER — OUTPATIENT (OUTPATIENT)
Dept: OUTPATIENT SERVICES | Facility: HOSPITAL | Age: 43
LOS: 1 days | End: 2023-09-14

## 2023-09-14 DIAGNOSIS — I10 ESSENTIAL (PRIMARY) HYPERTENSION: ICD-10-CM

## 2023-09-18 ENCOUNTER — RX RENEWAL (OUTPATIENT)
Age: 43
End: 2023-09-18

## 2023-09-18 DIAGNOSIS — J30.2 OTHER SEASONAL ALLERGIC RHINITIS: ICD-10-CM

## 2023-09-18 RX ORDER — AZELASTINE HYDROCHLORIDE AND FLUTICASONE PROPIONATE 137; 50 UG/1; UG/1
137-50 SPRAY, METERED NASAL TWICE DAILY
Qty: 1 | Refills: 5 | Status: ACTIVE | COMMUNITY
Start: 2023-03-20 | End: 1900-01-01

## 2023-09-28 ENCOUNTER — RX RENEWAL (OUTPATIENT)
Age: 43
End: 2023-09-28

## 2023-10-10 ENCOUNTER — APPOINTMENT (OUTPATIENT)
Dept: BARIATRICS/WEIGHT MGMT | Facility: CLINIC | Age: 43
End: 2023-10-10
Payer: COMMERCIAL

## 2023-10-10 VITALS — BODY MASS INDEX: 38.36 KG/M2 | HEIGHT: 76 IN | WEIGHT: 315 LBS

## 2023-10-10 PROCEDURE — 97803 MED NUTRITION INDIV SUBSEQ: CPT

## 2023-10-16 ENCOUNTER — RX RENEWAL (OUTPATIENT)
Age: 43
End: 2023-10-16

## 2023-10-16 DIAGNOSIS — H10.9 UNSPECIFIED CONJUNCTIVITIS: ICD-10-CM

## 2023-10-18 ENCOUNTER — TRANSCRIPTION ENCOUNTER (OUTPATIENT)
Age: 43
End: 2023-10-18

## 2023-10-18 ENCOUNTER — RX RENEWAL (OUTPATIENT)
Age: 43
End: 2023-10-18

## 2023-10-30 ENCOUNTER — TRANSCRIPTION ENCOUNTER (OUTPATIENT)
Age: 43
End: 2023-10-30

## 2023-11-02 ENCOUNTER — APPOINTMENT (OUTPATIENT)
Dept: BARIATRICS/WEIGHT MGMT | Facility: CLINIC | Age: 43
End: 2023-11-02
Payer: COMMERCIAL

## 2023-11-02 VITALS — WEIGHT: 315 LBS | HEIGHT: 76 IN | BODY MASS INDEX: 38.36 KG/M2

## 2023-11-02 PROCEDURE — 99213 OFFICE O/P EST LOW 20 MIN: CPT | Mod: 95

## 2023-11-07 ENCOUNTER — TRANSCRIPTION ENCOUNTER (OUTPATIENT)
Age: 43
End: 2023-11-07

## 2023-11-14 ENCOUNTER — APPOINTMENT (OUTPATIENT)
Dept: BARIATRICS | Facility: CLINIC | Age: 43
End: 2023-11-14
Payer: COMMERCIAL

## 2023-11-14 VITALS
TEMPERATURE: 96.7 F | DIASTOLIC BLOOD PRESSURE: 74 MMHG | BODY MASS INDEX: 38.36 KG/M2 | WEIGHT: 315 LBS | SYSTOLIC BLOOD PRESSURE: 130 MMHG | HEART RATE: 86 BPM | OXYGEN SATURATION: 99 % | HEIGHT: 76 IN

## 2023-11-14 PROCEDURE — 99214 OFFICE O/P EST MOD 30 MIN: CPT

## 2023-12-12 ENCOUNTER — APPOINTMENT (OUTPATIENT)
Dept: BARIATRICS/WEIGHT MGMT | Facility: CLINIC | Age: 43
End: 2023-12-12
Payer: COMMERCIAL

## 2023-12-12 PROCEDURE — 97803 MED NUTRITION INDIV SUBSEQ: CPT

## 2023-12-13 ENCOUNTER — APPOINTMENT (OUTPATIENT)
Dept: BARIATRICS/WEIGHT MGMT | Facility: CLINIC | Age: 43
End: 2023-12-13
Payer: COMMERCIAL

## 2023-12-13 ENCOUNTER — OUTPATIENT (OUTPATIENT)
Dept: OUTPATIENT SERVICES | Facility: HOSPITAL | Age: 43
LOS: 1 days | End: 2023-12-13
Payer: COMMERCIAL

## 2023-12-13 VITALS — BODY MASS INDEX: 38.36 KG/M2 | HEIGHT: 76 IN | WEIGHT: 315 LBS

## 2023-12-13 DIAGNOSIS — E66.9 OBESITY, UNSPECIFIED: ICD-10-CM

## 2023-12-13 PROCEDURE — 99213 OFFICE O/P EST LOW 20 MIN: CPT | Mod: 95

## 2023-12-13 PROCEDURE — G0463: CPT

## 2023-12-13 NOTE — ASSESSMENT
[FreeTextEntry1] : BARIATRIC SURGERY HISTORY: gastric sleeve 5/2021, lifetime max = 595 lbs, pre-op 495 lbs OBESITY CO-MORBIDITIES: MADELYN, DM ANTI-OBESITY MEDICATIONS: phentermine, topiramate, saxenda OBESITY MEDICATION SIDE EFFECTS: none    Plan: continue phentermine 37.5mg po daily and topiramate 100mg switch to rybelsus 14 mg cont to work with RD cont regular cpap use encouraged increased physical activity if tolerated Increase hydration    f/u 6 weeks.

## 2023-12-13 NOTE — HISTORY OF PRESENT ILLNESS
[FreeTextEntry1] : 44 yo man returns for obesity medicine f/u  weight now down to 387 lbs (lifetime max = 595 lbs). Down 26 lbs in last 7 weeks  had been tolerating trulicity but not able to get it anymore because change in insurance coverage for it  He has been taking phentermine 37.5mg and topiramate 100mg has remained relatively active Feels eating is controlled. Girlfriend preparing meals for him.  continues to work with RD no complications from VSG  Otherwise without new complaints today.

## 2023-12-14 DIAGNOSIS — I10 ESSENTIAL (PRIMARY) HYPERTENSION: ICD-10-CM

## 2024-01-11 ENCOUNTER — APPOINTMENT (OUTPATIENT)
Dept: BARIATRICS/WEIGHT MGMT | Facility: CLINIC | Age: 44
End: 2024-01-11
Payer: SELF-PAY

## 2024-01-11 VITALS — BODY MASS INDEX: 38.36 KG/M2 | HEIGHT: 76 IN | WEIGHT: 315 LBS

## 2024-01-11 PROCEDURE — 99213 OFFICE O/P EST LOW 20 MIN: CPT | Mod: 95

## 2024-01-11 RX ORDER — SEMAGLUTIDE 1 MG/.5ML
1 INJECTION, SOLUTION SUBCUTANEOUS
Qty: 1 | Refills: 2 | Status: DISCONTINUED | COMMUNITY
Start: 2023-11-02 | End: 2024-01-11

## 2024-01-11 RX ORDER — DULAGLUTIDE 3 MG/.5ML
3 INJECTION, SOLUTION SUBCUTANEOUS
Qty: 4 | Refills: 5 | Status: DISCONTINUED | COMMUNITY
Start: 2023-06-14 | End: 2024-01-11

## 2024-01-15 NOTE — ASSESSMENT
[FreeTextEntry1] : BARIATRIC SURGERY HISTORY: gastric sleeve 5/2021, lifetime max = 595 lbs, pre-op 495 lbs OBESITY CO-MORBIDITIES: MADELYN, DM ANTI-OBESITY MEDICATIONS: phentermine, topiramate, saxenda OBESITY MEDICATION SIDE EFFECTS: none    Plan: continue phentermine 37.5mg po daily and topiramate 100mg switch to rybelsus 14 mg- pending arrival  cont to work with RD continue current exercise regiment  recommend nightly cpap use     f/u 4- 6 weeks.

## 2024-01-15 NOTE — HISTORY OF PRESENT ILLNESS
[FreeTextEntry1] : This is a 43 year old male  being seen obesity followup visit.   Patient's weight down 8 pounds  He has  not received rybesus yet, but continues phentermine and topiramte   going to the gym 3 days a week  occasional walks on non-gym days   cooks with girlfriend, doing well  seeing RD regularly  sleeping better, not wearing cpap  8 hours minimum

## 2024-01-31 ENCOUNTER — APPOINTMENT (OUTPATIENT)
Dept: BARIATRICS/WEIGHT MGMT | Facility: CLINIC | Age: 44
End: 2024-01-31
Payer: SELF-PAY

## 2024-01-31 ENCOUNTER — OUTPATIENT (OUTPATIENT)
Dept: OUTPATIENT SERVICES | Facility: HOSPITAL | Age: 44
LOS: 1 days | End: 2024-01-31
Payer: COMMERCIAL

## 2024-01-31 PROCEDURE — 99213 OFFICE O/P EST LOW 20 MIN: CPT | Mod: 95

## 2024-01-31 PROCEDURE — G0463: CPT

## 2024-02-01 DIAGNOSIS — I10 ESSENTIAL (PRIMARY) HYPERTENSION: ICD-10-CM

## 2024-02-11 NOTE — HISTORY OF PRESENT ILLNESS
[FreeTextEntry1] : 42 yo man returns for obesity medicine f/u  weight now down to 379lbs (lifetime max = 595 lbs). Down additional 8 lbs since last visit  had issue where he ran out of trulicity - was not able to obain rybelsus He has remained on phentermine 37.5mg and topiramate 100mg feels hungrier off of GLP-1 RA but working hard to control eating. Girlfriend preparing meals for him.  continues to work with RD no complications from VSG  Otherwise without new complaints today.

## 2024-02-11 NOTE — ASSESSMENT
[FreeTextEntry1] : BARIATRIC SURGERY HISTORY: gastric sleeve 5/2021, lifetime max = 595 lbs, pre-op 495 lbs OBESITY CO-MORBIDITIES: MADELYN, DM ANTI-OBESITY MEDICATIONS: phentermine, topiramate, saxenda OBESITY MEDICATION SIDE EFFECTS: none    Plan: continue phentermine 37.5mg po daily and topiramate 100mg hold off on GLP-1RA - can consider tirzepatide but coverage still an issue cont to work with RD cont regular cpap use encouraged increased physical activity if tolerated Increase hydration    f/u 6 weeks.

## 2024-02-13 ENCOUNTER — APPOINTMENT (OUTPATIENT)
Dept: BARIATRICS | Facility: CLINIC | Age: 44
End: 2024-02-13
Payer: SELF-PAY

## 2024-02-13 VITALS
OXYGEN SATURATION: 99 % | BODY MASS INDEX: 38.36 KG/M2 | SYSTOLIC BLOOD PRESSURE: 120 MMHG | TEMPERATURE: 96.6 F | HEIGHT: 76 IN | DIASTOLIC BLOOD PRESSURE: 80 MMHG | WEIGHT: 315 LBS | HEART RATE: 83 BPM

## 2024-02-13 PROCEDURE — 99214 OFFICE O/P EST MOD 30 MIN: CPT

## 2024-02-15 ENCOUNTER — APPOINTMENT (OUTPATIENT)
Dept: BARIATRICS/WEIGHT MGMT | Facility: CLINIC | Age: 44
End: 2024-02-15
Payer: SELF-PAY

## 2024-02-15 PROCEDURE — 99213 OFFICE O/P EST LOW 20 MIN: CPT | Mod: 95

## 2024-02-15 NOTE — ASSESSMENT
[FreeTextEntry1] : BARIATRIC SURGERY HISTORY: gastric sleeve 5/2021, lifetime max = 595 lbs, pre-op 495 lbs OBESITY CO-MORBIDITIES: MADELYN, DM ANTI-OBESITY MEDICATIONS: phentermine, topiramate, saxenda OBESITY MEDICATION SIDE EFFECTS: none    Plan: continue phentermine 37.5mg po daily and topiramate 100mg patient will pay out of pocket for zepbound if he can get a coupon applied  cont to work with RD continue current exercise regiment  recommend nightly cpap use     f/u 4- 6 weeks.

## 2024-02-15 NOTE — HISTORY OF PRESENT ILLNESS
[FreeTextEntry1] : This is a 43 year old male  being seen obesity followup visit.   Patient's home scale says 370 pounds  clothing is fitting bigger, down one size  He has not received rybesus yet, but continues phentermine and topiramate  zepbound pending   going to the gym 3 days a week  walks on non-gym days   seeing RD regularly   not wearing cpap

## 2024-02-16 NOTE — PRE-OP CHECKLIST - NEEDLE GAUGE
Pt is resting comfortably in bed without complaints. Very pleasant patient and helpful wife at bedside.    Patient has been hypervigilant about his movement and SCD use.  He has not been comfortable with being in the hospital bed and was often found in the bedside chair or sitting on the edge of the bed.    Patient is use adverse to pain medication.  Though, he did request some Tylenol early this AM.    Patient has had gradually improving color of his urine and excellent output volume.  It is now light red/pink in color. His AGUSTÍN drain output has slowed and will likely be ready for an order to discontinue.  Patient reports passing flatus and we have advanced diet, as ordered.  He is anxious about pushing himself beyond liquids, at present.  I am continuing to encourage him to \"try\" other things. Best we accomplished was his eating a grape popsicle.    Hourly rounds completed and all needs are met. Bed is locked, low and call light is within reach and patient is encouraged to call for any need(s).    20

## 2024-02-22 ENCOUNTER — APPOINTMENT (OUTPATIENT)
Dept: BARIATRICS/WEIGHT MGMT | Facility: CLINIC | Age: 44
End: 2024-02-22
Payer: SELF-PAY

## 2024-02-22 PROCEDURE — 97803 MED NUTRITION INDIV SUBSEQ: CPT | Mod: 95

## 2024-03-04 VITALS — BODY MASS INDEX: 38.36 KG/M2 | WEIGHT: 315 LBS | HEIGHT: 76 IN

## 2024-03-13 ENCOUNTER — APPOINTMENT (OUTPATIENT)
Dept: BARIATRICS/WEIGHT MGMT | Facility: CLINIC | Age: 44
End: 2024-03-13
Payer: SELF-PAY

## 2024-03-13 ENCOUNTER — OUTPATIENT (OUTPATIENT)
Dept: OUTPATIENT SERVICES | Facility: HOSPITAL | Age: 44
LOS: 1 days | End: 2024-03-13
Payer: COMMERCIAL

## 2024-03-13 DIAGNOSIS — I10 ESSENTIAL (PRIMARY) HYPERTENSION: ICD-10-CM

## 2024-03-13 PROCEDURE — 99213 OFFICE O/P EST LOW 20 MIN: CPT | Mod: 95

## 2024-03-13 PROCEDURE — G0463: CPT

## 2024-03-21 NOTE — HISTORY OF PRESENT ILLNESS
[Procedure: ___] : Procedure performed: [unfilled]  [Date of Surgery: ___] : Date of Surgery:   [unfilled] [Surgeon Name:   ___] : Surgeon Name: Dr. NAIDU [Pre-Op Weight ___] : Pre-op weight was [unfilled] lbs [de-identified] : 43-year-old man s/p Laparoscopic Sleeve Gastrectomy with hiatal hernia repair presents for follow-up. 8 lb weight loss since last visit; on Phentermine 37.5 mg and Topiramate 50 mg 2 tablets/night (by OM); pt is switching to Zepbound; has an Rx for Zepbound 5 mg (by OM); has yet to start. Reports no abdominal pain, nausea/vomiting, constipation, diarrhea, reflux/heartburn. Patient eating 3 protein-rich meals/day, drinking adequate zero-calorie fluids/day, taking bariatric vitamins, and exercising (i.e. goes to the gym).  Next nutritionist appointment is 2/22/24

## 2024-03-21 NOTE — PHYSICAL EXAM
[Normal] : affect appropriate [de-identified] : soft, NT, ND, no diastasis or hernias appreciated, incisions well healed

## 2024-03-21 NOTE — ASSESSMENT
[FreeTextEntry1] : 43-year-old man s/p Laparoscopic Sleeve Gastrectomy with hiatal hernia repair presents for follow-up. 8 lb weight loss since last visit; on Phentermine 37.5 mg and Topiramate 50 mg 2 tablets/night (by OM); pt is switching to Zepbound; has an Rx for Zepbound 5 mg (by OM); has yet to start. Nutrition and exercise guidelines reviewed with the patient.   Continue 3 protein-focused meals/day (aim for 60 g - 70 g protein/day) Encourage zero calorie fluid intake (64 oz/day) Continue bariatric vitamins Exercise with cardio (aim for 8k-10k steps/day) and strength training 2-3x/week Use step counter Weigh yourself 1x-2x/week Try the Calm sergio or Soothing Pod sergio for stress management Follow-up with OM Follow-up with nutritionist (keep a food log) Follow-up in 3 months with blood work (try to complete blood work 1-2 weeks prior to next visit) All questions answered   Call with any questions or concerns   Time before and after visit spent reviewing chart

## 2024-03-25 DIAGNOSIS — E66.01 MORBID (SEVERE) OBESITY DUE TO EXCESS CALORIES: ICD-10-CM

## 2024-04-17 ENCOUNTER — APPOINTMENT (OUTPATIENT)
Dept: BARIATRICS/WEIGHT MGMT | Facility: CLINIC | Age: 44
End: 2024-04-17
Payer: SELF-PAY

## 2024-04-17 ENCOUNTER — OUTPATIENT (OUTPATIENT)
Dept: OUTPATIENT SERVICES | Facility: HOSPITAL | Age: 44
LOS: 1 days | End: 2024-04-17
Payer: COMMERCIAL

## 2024-04-17 VITALS — BODY MASS INDEX: 38.36 KG/M2 | HEIGHT: 76 IN | WEIGHT: 315 LBS

## 2024-04-17 DIAGNOSIS — G47.33 OBSTRUCTIVE SLEEP APNEA (ADULT) (PEDIATRIC): ICD-10-CM

## 2024-04-17 PROCEDURE — G0463: CPT

## 2024-04-17 PROCEDURE — 99213 OFFICE O/P EST LOW 20 MIN: CPT | Mod: 95

## 2024-04-17 RX ORDER — ORAL SEMAGLUTIDE 14 MG/1
14 TABLET ORAL
Qty: 90 | Refills: 1 | Status: DISCONTINUED | COMMUNITY
Start: 2023-12-13 | End: 2024-04-17

## 2024-04-17 NOTE — HISTORY OF PRESENT ILLNESS
[FreeTextEntry1] : This is a 43 year old male  being seen obesity followup visit.   Patient's clothing is fitting bigger, down one size  taking phentermine and topiramate   eating 3-4 meals a day  bringing meals to work   Has not been to the gym in the last 2 weeks working around the house/gardening   seeing RD regularly  sleeping better- may take melatonin

## 2024-04-18 DIAGNOSIS — I10 ESSENTIAL (PRIMARY) HYPERTENSION: ICD-10-CM

## 2024-04-19 PROBLEM — G47.33 OBSTRUCTIVE SLEEP APNEA, ADULT: Status: ACTIVE | Noted: 2017-01-31

## 2024-04-22 DIAGNOSIS — R73.03 PREDIABETES: ICD-10-CM

## 2024-04-22 DIAGNOSIS — Z98.84 BARIATRIC SURGERY STATUS: ICD-10-CM

## 2024-04-22 DIAGNOSIS — G47.33 OBSTRUCTIVE SLEEP APNEA (ADULT) (PEDIATRIC): ICD-10-CM

## 2024-04-22 DIAGNOSIS — E66.01 MORBID (SEVERE) OBESITY DUE TO EXCESS CALORIES: ICD-10-CM

## 2024-04-24 ENCOUNTER — RX RENEWAL (OUTPATIENT)
Age: 44
End: 2024-04-24

## 2024-04-24 RX ORDER — TOPIRAMATE 50 MG/1
50 TABLET, FILM COATED ORAL
Qty: 180 | Refills: 1 | Status: ACTIVE | COMMUNITY
Start: 2019-11-18 | End: 1900-01-01

## 2024-05-07 ENCOUNTER — APPOINTMENT (OUTPATIENT)
Dept: BARIATRICS/WEIGHT MGMT | Facility: CLINIC | Age: 44
End: 2024-05-07
Payer: MEDICAID

## 2024-05-07 VITALS — BODY MASS INDEX: 38.36 KG/M2 | HEIGHT: 76 IN | WEIGHT: 315 LBS

## 2024-05-07 PROCEDURE — 97803 MED NUTRITION INDIV SUBSEQ: CPT | Mod: 95

## 2024-05-15 NOTE — REASON FOR VISIT
[Home] : at home, [unfilled] , at the time of the visit. [Other Location: e.g. Home (Enter Location, City,State)___] : at [unfilled] [Patient] : the patient [Self] : self [Follow-Up] : a follow-up visit [FreeTextEntry2] : 12 point ROS otherwise negative except per above

## 2024-05-16 ENCOUNTER — APPOINTMENT (OUTPATIENT)
Dept: BARIATRICS | Facility: CLINIC | Age: 44
End: 2024-05-16
Payer: MEDICAID

## 2024-05-16 VITALS
WEIGHT: 315 LBS | TEMPERATURE: 97.1 F | SYSTOLIC BLOOD PRESSURE: 130 MMHG | DIASTOLIC BLOOD PRESSURE: 90 MMHG | OXYGEN SATURATION: 99 % | HEART RATE: 92 BPM | HEIGHT: 76 IN | BODY MASS INDEX: 38.36 KG/M2

## 2024-05-16 DIAGNOSIS — R63.4 ABNORMAL WEIGHT LOSS: ICD-10-CM

## 2024-05-16 DIAGNOSIS — Z98.890 OTHER SPECIFIED POSTPROCEDURAL STATES: ICD-10-CM

## 2024-05-16 DIAGNOSIS — Z87.19 OTHER SPECIFIED POSTPROCEDURAL STATES: ICD-10-CM

## 2024-05-16 DIAGNOSIS — Z98.84 BARIATRIC SURGERY STATUS: ICD-10-CM

## 2024-05-16 PROCEDURE — 99214 OFFICE O/P EST MOD 30 MIN: CPT

## 2024-05-16 PROCEDURE — G2211 COMPLEX E/M VISIT ADD ON: CPT | Mod: NC,1L

## 2024-05-16 RX ORDER — TIRZEPATIDE 5 MG/.5ML
5 INJECTION, SOLUTION SUBCUTANEOUS
Qty: 4 | Refills: 5 | Status: DISCONTINUED | OUTPATIENT
Start: 2024-02-11 | End: 2024-05-16

## 2024-05-16 RX ORDER — GABAPENTIN 100 MG/1
100 CAPSULE ORAL
Refills: 0 | Status: DISCONTINUED | COMMUNITY
End: 2024-05-16

## 2024-05-17 PROBLEM — R63.4 WEIGHT LOSS: Status: ACTIVE | Noted: 2024-02-13

## 2024-05-17 NOTE — ASSESSMENT
[FreeTextEntry1] : 43-year-old man s/p Laparoscopic sleeve gastrectomy with hiatal hernia repair presents for follow-up. 8 lb weight loss since last visit. Nutrition and exercise guidelines reviewed with the patient.   Continue 3 protein-focused meals/day (aim for 60 g - 70 g protein/day) Encourage zero calorie fluid intake (64 oz/day); avoid sweetened beverages and artificial sweeteners Continue bariatric vitamins Exercise with cardio (aim for 8k-10k steps/day) and strength training 2-3x/week Use step counter Weigh yourself 1x-2x/week Try the Calm sergio or Soothing Pod sergio for stress management Follow-up with nutritionist (keep a food log) Follow-up Obesity Medicine and PCP regarding medical weight management Labs ordered; patient will have done with PCP  All questions answered Call with any questions or concerns Follow-up in 3 months Patient seen with Dr. Viveros   Time before and after visit spent reviewing chart

## 2024-05-17 NOTE — PHYSICAL EXAM
[Normal] : affect appropriate [de-identified] : soft, NT, ND, no diastasis or hernias appreciated, incisions well healed  [de-identified] : THEE

## 2024-05-17 NOTE — HISTORY OF PRESENT ILLNESS
[Procedure: ___] : Procedure performed: [unfilled]  [Date of Surgery: ___] : Date of Surgery:   [unfilled] [Surgeon Name:   ___] : Surgeon Name: Dr. NAIDU [Pre-Op Weight ___] : Pre-op weight was [unfilled] lbs [de-identified] : 43-year-old man s/p Laparoscopic sleeve gastrectomy with hiatal hernia repair presents for follow-up. 8 lb weight loss since last visit. Patient is currently taking phentermine and topiramate managed by Obesity Medicine, but trying to transition to Zepbound with his PCP. Reports no abdominal pain, nausea/vomiting, constipation, diarrhea, nor reflux/heartburn. Patient eating 3 protein-rich meals/day, drinking adequate zero-calorie fluids/day and an energy drink daily, taking bariatric vitamins, and exercising by walking 2x/week and doing cardio and strength training at the gym 2/week.   Next nutritionist appointment is 7/3/24

## 2024-05-23 ENCOUNTER — APPOINTMENT (OUTPATIENT)
Dept: FAMILY MEDICINE | Facility: CLINIC | Age: 44
End: 2024-05-23
Payer: MEDICAID

## 2024-05-23 VITALS
OXYGEN SATURATION: 99 % | DIASTOLIC BLOOD PRESSURE: 84 MMHG | RESPIRATION RATE: 14 BRPM | HEART RATE: 90 BPM | SYSTOLIC BLOOD PRESSURE: 132 MMHG | BODY MASS INDEX: 38.36 KG/M2 | WEIGHT: 315 LBS | HEIGHT: 76 IN

## 2024-05-23 DIAGNOSIS — Z11.52 ENCOUNTER FOR PREPROCEDURAL LABORATORY EXAMINATION: ICD-10-CM

## 2024-05-23 DIAGNOSIS — Z82.49 FAMILY HISTORY OF ISCHEMIC HEART DISEASE AND OTHER DISEASES OF THE CIRCULATORY SYSTEM: ICD-10-CM

## 2024-05-23 DIAGNOSIS — E46 UNSPECIFIED PROTEIN-CALORIE MALNUTRITION: ICD-10-CM

## 2024-05-23 DIAGNOSIS — Z01.818 ENCOUNTER FOR OTHER PREPROCEDURAL EXAMINATION: ICD-10-CM

## 2024-05-23 DIAGNOSIS — M25.539 PAIN IN UNSPECIFIED WRIST: ICD-10-CM

## 2024-05-23 DIAGNOSIS — Z23 ENCOUNTER FOR IMMUNIZATION: ICD-10-CM

## 2024-05-23 DIAGNOSIS — S83.241A OTHER TEAR OF MEDIAL MENISCUS, CURRENT INJURY, RIGHT KNEE, INITIAL ENCOUNTER: ICD-10-CM

## 2024-05-23 DIAGNOSIS — R73.03 PREDIABETES.: ICD-10-CM

## 2024-05-23 DIAGNOSIS — E66.01 MORBID (SEVERE) OBESITY DUE TO EXCESS CALORIES: ICD-10-CM

## 2024-05-23 DIAGNOSIS — Z01.810 ENCOUNTER FOR PREPROCEDURAL CARDIOVASCULAR EXAMINATION: ICD-10-CM

## 2024-05-23 DIAGNOSIS — M25.562 PAIN IN LEFT KNEE: ICD-10-CM

## 2024-05-23 DIAGNOSIS — Z12.5 ENCOUNTER FOR SCREENING FOR MALIGNANT NEOPLASM OF PROSTATE: ICD-10-CM

## 2024-05-23 DIAGNOSIS — M25.572 PAIN IN LEFT ANKLE AND JOINTS OF LEFT FOOT: ICD-10-CM

## 2024-05-23 DIAGNOSIS — R79.1 ABNORMAL COAGULATION PROFILE: ICD-10-CM

## 2024-05-23 DIAGNOSIS — M79.89 OTHER SPECIFIED SOFT TISSUE DISORDERS: ICD-10-CM

## 2024-05-23 DIAGNOSIS — Z78.9 OTHER SPECIFIED HEALTH STATUS: ICD-10-CM

## 2024-05-23 DIAGNOSIS — M72.2 PLANTAR FASCIAL FIBROMATOSIS: ICD-10-CM

## 2024-05-23 DIAGNOSIS — Z01.811 ENCOUNTER FOR PREPROCEDURAL RESPIRATORY EXAMINATION: ICD-10-CM

## 2024-05-23 DIAGNOSIS — E55.9 VITAMIN D DEFICIENCY, UNSPECIFIED: ICD-10-CM

## 2024-05-23 DIAGNOSIS — R10.9 UNSPECIFIED ABDOMINAL PAIN: ICD-10-CM

## 2024-05-23 DIAGNOSIS — Z83.3 FAMILY HISTORY OF DIABETES MELLITUS: ICD-10-CM

## 2024-05-23 DIAGNOSIS — Z01.812 ENCOUNTER FOR PREPROCEDURAL LABORATORY EXAMINATION: ICD-10-CM

## 2024-05-23 DIAGNOSIS — E11.9 TYPE 2 DIABETES MELLITUS W/OUT COMPLICATIONS: ICD-10-CM

## 2024-05-23 DIAGNOSIS — Z84.1 FAMILY HISTORY OF DISORDERS OF KIDNEY AND URETER: ICD-10-CM

## 2024-05-23 DIAGNOSIS — R53.83 OTHER FATIGUE: ICD-10-CM

## 2024-05-23 DIAGNOSIS — Z86.2 PERSONAL HISTORY OF DISEASES OF THE BLOOD AND BLOOD-FORMING ORGANS AND CERTAIN DISORDERS INVOLVING THE IMMUNE MECHANISM: ICD-10-CM

## 2024-05-23 DIAGNOSIS — R79.9 ABNORMAL FINDING OF BLOOD CHEMISTRY, UNSPECIFIED: ICD-10-CM

## 2024-05-23 DIAGNOSIS — R82.71 BACTERIURIA: ICD-10-CM

## 2024-05-23 DIAGNOSIS — Z87.828 PERSONAL HISTORY OF OTHER (HEALED) PHYSICAL INJURY AND TRAUMA: ICD-10-CM

## 2024-05-23 PROCEDURE — 36415 COLL VENOUS BLD VENIPUNCTURE: CPT

## 2024-05-23 PROCEDURE — 99214 OFFICE O/P EST MOD 30 MIN: CPT

## 2024-05-23 PROCEDURE — G2211 COMPLEX E/M VISIT ADD ON: CPT | Mod: NC,1L

## 2024-05-23 RX ORDER — TOBRAMYCIN AND DEXAMETHASONE 3; 1 MG/ML; MG/ML
0.3-0.1 SUSPENSION/ DROPS OPHTHALMIC 4 TIMES DAILY
Qty: 10 | Refills: 0 | Status: COMPLETED | COMMUNITY
Start: 2023-03-20 | End: 2024-05-23

## 2024-05-23 RX ORDER — TIRZEPATIDE 2.5 MG/.5ML
2.5 INJECTION, SOLUTION SUBCUTANEOUS
Qty: 4 | Refills: 1 | Status: ACTIVE | COMMUNITY
Start: 2024-05-23 | End: 1900-01-01

## 2024-05-23 NOTE — HEALTH RISK ASSESSMENT
[Yes] : Yes [Monthly or less (1 pt)] : Monthly or less (1 point) [1 or 2 (0 pts)] : 1 or 2 (0 points) [Never (0 pts)] : Never (0 points) [No] : In the past 12 months have you used drugs other than those required for medical reasons? No [No falls in past year] : Patient reported no falls in the past year [0] : 2) Feeling down, depressed, or hopeless: Not at all (0) [PHQ-2 Negative - No further assessment needed] : PHQ-2 Negative - No further assessment needed [Patient/Caregiver not ready to engage] : , patient/caregiver not ready to engage [I will adhere to the patient's wishes.] : I will adhere to the patient's wishes. [Time Spent: ___ minutes] : Time Spent: [unfilled] minutes [Never] : Never [Audit-CScore] : 1 [de-identified] : Average [de-identified] : Average [[facility]] : 9 [NRY1Slssq] : 0 [AdvancecareDate] : 05/24

## 2024-05-23 NOTE — PHYSICAL EXAM
[Well Nourished] : well nourished [Well Developed] : well developed [Well-Appearing] : well-appearing [PERRL] : pupils equal round and reactive to light [EOMI] : extraocular movements intact [Normal Outer Ear/Nose] : the outer ears and nose were normal in appearance [Normal Oropharynx] : the oropharynx was normal [No JVD] : no jugular venous distention [No Lymphadenopathy] : no lymphadenopathy [Supple] : supple [Thyroid Normal, No Nodules] : the thyroid was normal and there were no nodules present [No Respiratory Distress] : no respiratory distress  [No Accessory Muscle Use] : no accessory muscle use [Clear to Auscultation] : lungs were clear to auscultation bilaterally [Normal Rate] : normal rate  [Regular Rhythm] : with a regular rhythm [Normal S1, S2] : normal S1 and S2 [No Murmur] : no murmur heard [No Carotid Bruits] : no carotid bruits [No Abdominal Bruit] : a ~M bruit was not heard ~T in the abdomen [No Varicosities] : no varicosities [Pedal Pulses Present] : the pedal pulses are present [No Edema] : there was no peripheral edema [No Palpable Aorta] : no palpable aorta [No Extremity Clubbing/Cyanosis] : no extremity clubbing/cyanosis [Soft] : abdomen soft [Non Tender] : non-tender [Non-distended] : non-distended [No Masses] : no abdominal mass palpated [No HSM] : no HSM [Normal Bowel Sounds] : normal bowel sounds [No CVA Tenderness] : no CVA  tenderness [No Spinal Tenderness] : no spinal tenderness [No Joint Swelling] : no joint swelling [Grossly Normal Strength/Tone] : grossly normal strength/tone [No Rash] : no rash [Coordination Grossly Intact] : coordination grossly intact [No Focal Deficits] : no focal deficits [Normal Gait] : normal gait [Deep Tendon Reflexes (DTR)] : deep tendon reflexes were 2+ and symmetric [___/1] : [unfilled]/1   [___/3] : [unfilled]/3 [___/5] : [unfilled]/5 [___/4] : [unfilled]/4 [___/2] : [unfilled]/2 [___/8] : [unfilled]/8 [Normal] : Normal [Normal Affect] : the affect was normal [Normal Insight/Judgement] : insight and judgment were intact [Comprehensive Foot Exam Normal] : Right and left foot were examined and both feet are normal. No ulcers in either foot. Toes are normal and with full ROM.  Normal tactile sensation with monofilament testing throughout both feet [de-identified] : Overweight [de-identified] : Mild left conjunctival injection, no discharge appreciated [TextBox_2] : Yes [TextBox_4] : HS [SlumsTotal] : 30

## 2024-05-23 NOTE — ASSESSMENT
[FreeTextEntry1] : Overweight/Fatigue/DM - Labs for Fatigue and Dysmetabolism. Start Zepbound 2.5 mg  Low Vit. D - Vit. D Level. Low Folate - Folic Acid 1 mg. QD. MADELYN - C Pap. DM Foot Exam - Podiatry Referral. DM Eye Exam - Opthalmology Referral. Low Iron - OTC Iron as Directed. PSA Screening - PSA level  F/U 3 weeks to Review Test Results.

## 2024-05-28 LAB
25(OH)D3 SERPL-MCNC: 33.5 NG/ML
ALBUMIN SERPL ELPH-MCNC: 4.4 G/DL
ALP BLD-CCNC: 82 U/L
ALT SERPL-CCNC: 20 U/L
ANION GAP SERPL CALC-SCNC: 18 MMOL/L
APPEARANCE: CLEAR
AST SERPL-CCNC: 20 U/L
BASOPHILS # BLD AUTO: 0.07 K/UL
BASOPHILS NFR BLD AUTO: 0.6 %
BILIRUB SERPL-MCNC: 0.2 MG/DL
BILIRUBIN URINE: NEGATIVE
BLOOD URINE: NEGATIVE
BUN SERPL-MCNC: 19 MG/DL
C PEPTIDE SERPL-MCNC: 1.7 NG/ML
CALCIUM SERPL-MCNC: 9.6 MG/DL
CHLORIDE SERPL-SCNC: 100 MMOL/L
CHOLEST SERPL-MCNC: 167 MG/DL
CO2 SERPL-SCNC: 22 MMOL/L
COLOR: YELLOW
CREAT SERPL-MCNC: 1.06 MG/DL
CREAT SPEC-SCNC: 219 MG/DL
EGFR: 90 ML/MIN/1.73M2
EOSINOPHIL # BLD AUTO: 0.08 K/UL
EOSINOPHIL NFR BLD AUTO: 0.7 %
ESTIMATED AVERAGE GLUCOSE: 114 MG/DL
FERRITIN SERPL-MCNC: 227 NG/ML
FOLATE SERPL-MCNC: 13.1 NG/ML
GLUCOSE QUALITATIVE U: NEGATIVE MG/DL
GLUCOSE SERPL-MCNC: 41 MG/DL
HBA1C MFR BLD HPLC: 5.6 %
HCT VFR BLD CALC: 42.7 %
HDLC SERPL-MCNC: 40 MG/DL
HGB BLD-MCNC: 13.6 G/DL
IMM GRANULOCYTES NFR BLD AUTO: 0.3 %
IRON SATN MFR SERPL: 15 %
IRON SERPL-MCNC: 41 UG/DL
KETONES URINE: NEGATIVE MG/DL
LDLC SERPL CALC-MCNC: 114 MG/DL
LEUKOCYTE ESTERASE URINE: NEGATIVE
LYMPHOCYTES # BLD AUTO: 2.65 K/UL
LYMPHOCYTES NFR BLD AUTO: 22.9 %
MAN DIFF?: NORMAL
MCHC RBC-ENTMCNC: 26.3 PG
MCHC RBC-ENTMCNC: 31.9 GM/DL
MCV RBC AUTO: 82.4 FL
MICROALBUMIN 24H UR DL<=1MG/L-MCNC: 1.5 MG/DL
MICROALBUMIN/CREAT 24H UR-RTO: 7 MG/G
MONOCYTES # BLD AUTO: 0.76 K/UL
MONOCYTES NFR BLD AUTO: 6.6 %
NEUTROPHILS # BLD AUTO: 7.95 K/UL
NEUTROPHILS NFR BLD AUTO: 68.9 %
NITRITE URINE: NEGATIVE
NONHDLC SERPL-MCNC: 127 MG/DL
PH URINE: 5.5
PLATELET # BLD AUTO: 257 K/UL
POTASSIUM SERPL-SCNC: 4.1 MMOL/L
PREALB SERPL NEPH-MCNC: 22 MG/DL
PROT SERPL-MCNC: 7.9 G/DL
PROTEIN URINE: NEGATIVE MG/DL
PSA SERPL-MCNC: 0.17 NG/ML
RBC # BLD: 5.18 M/UL
RBC # FLD: 14.7 %
SODIUM SERPL-SCNC: 140 MMOL/L
SPECIFIC GRAVITY URINE: 1.03
T4 SERPL-MCNC: 7.2 UG/DL
TESTOST FREE SERPL-MCNC: 8.7 PG/ML
TESTOST SERPL-MCNC: 422 NG/DL
TIBC SERPL-MCNC: 266 UG/DL
TRIGL SERPL-MCNC: 67 MG/DL
TSH SERPL-ACNC: 1.77 UIU/ML
UIBC SERPL-MCNC: 225 UG/DL
URINE CULTURE <10: ABNORMAL
UROBILINOGEN URINE: 0.2 MG/DL
VIT B12 SERPL-MCNC: 741 PG/ML
WBC # FLD AUTO: 11.55 K/UL

## 2024-05-30 LAB
VIT A SERPL-MCNC: 43.6 UG/DL
VIT B1 SERPL-MCNC: 102 NMOL/L
ZINC SERPL-MCNC: 72 UG/DL

## 2024-06-19 ENCOUNTER — OUTPATIENT (OUTPATIENT)
Dept: OUTPATIENT SERVICES | Facility: HOSPITAL | Age: 44
LOS: 1 days | End: 2024-06-19
Payer: COMMERCIAL

## 2024-06-19 ENCOUNTER — APPOINTMENT (OUTPATIENT)
Dept: BARIATRICS/WEIGHT MGMT | Facility: CLINIC | Age: 44
End: 2024-06-19
Payer: MEDICAID

## 2024-06-19 DIAGNOSIS — E66.01 MORBID (SEVERE) OBESITY DUE TO EXCESS CALORIES: ICD-10-CM

## 2024-06-19 DIAGNOSIS — I10 ESSENTIAL (PRIMARY) HYPERTENSION: ICD-10-CM

## 2024-06-19 PROCEDURE — G0463: CPT

## 2024-06-19 PROCEDURE — 99213 OFFICE O/P EST LOW 20 MIN: CPT | Mod: 95

## 2024-06-20 PROBLEM — E66.01 OBESITY, MORBID, BMI 50 OR HIGHER: Status: ACTIVE | Noted: 2021-03-16

## 2024-06-20 NOTE — ASSESSMENT
[FreeTextEntry1] : BARIATRIC SURGERY HISTORY: gastric sleeve 5/2021, lifetime max = 595 lbs, pre-op 495 lbs OBESITY CO-MORBIDITIES: MADELYN, DM ANTI-OBESITY MEDICATIONS: phentermine, topiramate, saxenda OBESITY MEDICATION SIDE EFFECTS: none    Plan: continue phentermine 37.5mg po daily and topiramate 100mg while he has had a tremendous amount of weight loss his BMI is still in high risk category of 42 and I strongly recommend his taking GLP-1 RA or multi-receptor agonist (tirzepatide or semaglutide) cont to work with RD cont regular cpap use increased physical activity adequate hydration    f/u 6 weeks.

## 2024-06-20 NOTE — HISTORY OF PRESENT ILLNESS
[FreeTextEntry1] : 42 yo man returns for obesity medicine f/u  weight now down to 345 lbs (lifetime max = 595 lbs). Down additional 20 lbs since last visit and 250 lbs overall  continues to have issues obtaining GLP-1RA or equivalent - trying for appeal He has remained on phentermine 37.5mg and topiramate 100mg continues to work hard and diet and lifestyle modification  Otherwise without new complaints today.

## 2024-06-24 DIAGNOSIS — E66.01 MORBID (SEVERE) OBESITY DUE TO EXCESS CALORIES: ICD-10-CM

## 2024-07-03 ENCOUNTER — APPOINTMENT (OUTPATIENT)
Dept: BARIATRICS/WEIGHT MGMT | Facility: CLINIC | Age: 44
End: 2024-07-03
Payer: MEDICAID

## 2024-07-03 DIAGNOSIS — E66.01 MORBID (SEVERE) OBESITY DUE TO EXCESS CALORIES: ICD-10-CM

## 2024-07-03 PROCEDURE — 97803 MED NUTRITION INDIV SUBSEQ: CPT | Mod: 95

## 2024-07-09 ENCOUNTER — APPOINTMENT (OUTPATIENT)
Dept: FAMILY MEDICINE | Facility: CLINIC | Age: 44
End: 2024-07-09
Payer: MEDICAID

## 2024-07-09 VITALS
SYSTOLIC BLOOD PRESSURE: 114 MMHG | HEIGHT: 76 IN | WEIGHT: 315 LBS | HEART RATE: 78 BPM | OXYGEN SATURATION: 98 % | BODY MASS INDEX: 38.36 KG/M2 | RESPIRATION RATE: 14 BRPM | DIASTOLIC BLOOD PRESSURE: 80 MMHG

## 2024-07-09 DIAGNOSIS — Z78.9 OTHER SPECIFIED HEALTH STATUS: ICD-10-CM

## 2024-07-09 DIAGNOSIS — Z84.1 FAMILY HISTORY OF DISORDERS OF KIDNEY AND URETER: ICD-10-CM

## 2024-07-09 DIAGNOSIS — Z12.5 ENCOUNTER FOR SCREENING FOR MALIGNANT NEOPLASM OF PROSTATE: ICD-10-CM

## 2024-07-09 DIAGNOSIS — Z82.49 FAMILY HISTORY OF ISCHEMIC HEART DISEASE AND OTHER DISEASES OF THE CIRCULATORY SYSTEM: ICD-10-CM

## 2024-07-09 DIAGNOSIS — R53.83 OTHER FATIGUE: ICD-10-CM

## 2024-07-09 DIAGNOSIS — E55.9 VITAMIN D DEFICIENCY, UNSPECIFIED: ICD-10-CM

## 2024-07-09 DIAGNOSIS — E11.9 TYPE 2 DIABETES MELLITUS W/OUT COMPLICATIONS: ICD-10-CM

## 2024-07-09 DIAGNOSIS — Z83.3 FAMILY HISTORY OF DIABETES MELLITUS: ICD-10-CM

## 2024-07-09 DIAGNOSIS — E61.1 IRON DEFICIENCY: ICD-10-CM

## 2024-07-09 PROCEDURE — 99214 OFFICE O/P EST MOD 30 MIN: CPT

## 2024-07-09 PROCEDURE — G2211 COMPLEX E/M VISIT ADD ON: CPT | Mod: NC,1L

## 2024-07-10 VITALS — WEIGHT: 315 LBS | HEIGHT: 76 IN | BODY MASS INDEX: 38.36 KG/M2

## 2024-07-19 ENCOUNTER — OUTPATIENT (OUTPATIENT)
Dept: OUTPATIENT SERVICES | Facility: HOSPITAL | Age: 44
LOS: 1 days | End: 2024-07-19
Payer: COMMERCIAL

## 2024-07-19 ENCOUNTER — APPOINTMENT (OUTPATIENT)
Dept: BARIATRICS/WEIGHT MGMT | Facility: CLINIC | Age: 44
End: 2024-07-19
Payer: MEDICAID

## 2024-07-19 VITALS — HEIGHT: 76 IN | BODY MASS INDEX: 38.36 KG/M2 | WEIGHT: 315 LBS

## 2024-07-19 DIAGNOSIS — G47.33 OBSTRUCTIVE SLEEP APNEA (ADULT) (PEDIATRIC): ICD-10-CM

## 2024-07-19 DIAGNOSIS — I10 ESSENTIAL (PRIMARY) HYPERTENSION: ICD-10-CM

## 2024-07-19 DIAGNOSIS — E66.01 MORBID (SEVERE) OBESITY DUE TO EXCESS CALORIES: ICD-10-CM

## 2024-07-19 DIAGNOSIS — Z98.84 BARIATRIC SURGERY STATUS: ICD-10-CM

## 2024-07-19 PROCEDURE — 99213 OFFICE O/P EST LOW 20 MIN: CPT | Mod: 95

## 2024-07-19 PROCEDURE — G0463: CPT

## 2024-07-19 RX ORDER — ORAL SEMAGLUTIDE 3 MG/1
3 TABLET ORAL
Qty: 30 | Refills: 1 | Status: DISCONTINUED | COMMUNITY
Start: 2024-07-11 | End: 2024-07-19

## 2024-07-29 DIAGNOSIS — E66.01 MORBID (SEVERE) OBESITY DUE TO EXCESS CALORIES: ICD-10-CM

## 2024-07-29 DIAGNOSIS — Z98.84 BARIATRIC SURGERY STATUS: ICD-10-CM

## 2024-07-29 DIAGNOSIS — G47.33 OBSTRUCTIVE SLEEP APNEA (ADULT) (PEDIATRIC): ICD-10-CM

## 2024-08-01 ENCOUNTER — APPOINTMENT (OUTPATIENT)
Dept: BARIATRICS | Facility: CLINIC | Age: 44
End: 2024-08-01
Payer: MEDICAID

## 2024-08-01 VITALS
WEIGHT: 315 LBS | TEMPERATURE: 97.9 F | OXYGEN SATURATION: 99 % | HEART RATE: 93 BPM | SYSTOLIC BLOOD PRESSURE: 130 MMHG | BODY MASS INDEX: 38.36 KG/M2 | HEIGHT: 76 IN | DIASTOLIC BLOOD PRESSURE: 86 MMHG

## 2024-08-01 DIAGNOSIS — R63.4 ABNORMAL WEIGHT LOSS: ICD-10-CM

## 2024-08-01 DIAGNOSIS — Z00.00 ENCOUNTER FOR GENERAL ADULT MEDICAL EXAMINATION W/OUT ABNORMAL FINDINGS: ICD-10-CM

## 2024-08-01 DIAGNOSIS — Z98.84 BARIATRIC SURGERY STATUS: ICD-10-CM

## 2024-08-01 PROCEDURE — 99214 OFFICE O/P EST MOD 30 MIN: CPT

## 2024-08-01 RX ORDER — URSODIOL 300 MG/1
300 CAPSULE ORAL
Qty: 30 | Refills: 5 | Status: ACTIVE | COMMUNITY
Start: 2024-08-01 | End: 1900-01-01

## 2024-08-01 NOTE — HISTORY OF PRESENT ILLNESS
[Procedure: ___] : Procedure performed: [unfilled]  [Date of Surgery: ___] : Date of Surgery:   [unfilled] [Surgeon Name:   ___] : Surgeon Name: Dr. NAIDU [Pre-Op Weight ___] : Pre-op weight was [unfilled] lbs [de-identified] : 43-year-old man s/p Laparoscopic sleeve gastrectomy with hiatal hernia repair presents for follow-up. Continues with weight loss since last visit (28 lbs), taking phentermine and topiramate managed by Obesity Medicine Dr. Jordan. No abdominal pain, reflux, nausea/vomiting, constipation, or diarrhea. Consuming 3 meals/day with adequate protein intake. Taking bariatric MVI caps. Drinking adequate water intake daily. Walking for activities, plus working on home fruit/vegetable garden. Sleeping well, but not compliant with CPAP use.   Last nutritionist appointment 7/3/2024

## 2024-08-01 NOTE — PHYSICAL EXAM
[Normal] : affect appropriate [de-identified] : EOMI, no conjunctival injection, anicteric [de-identified] : Well, healthy appearing adult [de-identified] : No visualized masses, JVD or audible bruits noted [de-identified] : Equal chest rise, non-labored respirations, no audible wheezing [de-identified] : Regular rate, no audible carotid bruits or JVD appreciated [de-identified] : soft, NT, ND, no diastasis or hernias appreciated, incisions well healed  [de-identified] : THEE

## 2024-08-01 NOTE — ASSESSMENT
[FreeTextEntry1] : 43-year-old man s/p Laparoscopic sleeve gastrectomy with hiatal hernia repair presents for follow-up. Continues with weight loss since last visit (28 lbs), taking phentermine and topiramate managed by Obesity Medicine Dr. Jordan.  Doing well overall.   Reviewed guidelines about nutrition and snacking - protein focus diet with 3 meals/day Encouraged better food choices - maintain food log Continue daily Bariatric MVI Daily zero calorie liquid intake goal of 64 oz/day Encouraged to participate in a daily exercise regimen incorporating cardio and strength training Track steps - goal approximately 8-10k steps per day   Labs performed May 2024 results reviewed with pt - no significant abnormalities, vitamin levels normal Next fasting labs to be done May 2025   Return to office in Dec 2024 Due to recent significant weight loss, will start ursodiol 300mg QD x 6 mo therapy for cholelithiasis prophylaxis per Dr. Viveros Follow up with nutritionist Follow up with Obesity Medicine Dr. Jordan as scheduled Follow up with PCP for continuity of care All questions answered  Pt seen with Dr. Viveros      Additional time spent before and after visit reviewing chart

## 2024-08-09 ENCOUNTER — APPOINTMENT (OUTPATIENT)
Dept: BARIATRICS/WEIGHT MGMT | Facility: CLINIC | Age: 44
End: 2024-08-09

## 2024-08-09 ENCOUNTER — OUTPATIENT (OUTPATIENT)
Dept: OUTPATIENT SERVICES | Facility: HOSPITAL | Age: 44
LOS: 1 days | End: 2024-08-09
Payer: COMMERCIAL

## 2024-08-09 DIAGNOSIS — I10 ESSENTIAL (PRIMARY) HYPERTENSION: ICD-10-CM

## 2024-08-09 PROCEDURE — G2211 COMPLEX E/M VISIT ADD ON: CPT | Mod: NC,1L,95

## 2024-08-09 PROCEDURE — G0463: CPT

## 2024-08-09 PROCEDURE — 99213 OFFICE O/P EST LOW 20 MIN: CPT | Mod: 95

## 2024-08-09 NOTE — ASSESSMENT
[FreeTextEntry1] : BARIATRIC SURGERY HISTORY: gastric sleeve 5/2021, lifetime max = 595 lbs, pre-op 495 lbs OBESITY CO-MORBIDITIES: MADELYN, DM ANTI-OBESITY MEDICATIONS: phentermine, topiramate, saxenda OBESITY MEDICATION SIDE EFFECTS: none    Plan: continue phentermine 37.5mg po daily and topiramate 100mg GLP-1 RA unfortunately not covered with current regulations and his insurance cont to work with NP cont regular cpap use cont with increased physical activity adequate hydration consider holding off on ursodiol given weight stability and lack of symptoms  f/u 6-8 weeks.

## 2024-08-09 NOTE — HISTORY OF PRESENT ILLNESS
[FreeTextEntry1] : 42 yo man returns for obesity medicine f/u  weight now 346 bs (lifetime max = 595 lbs). essentially unchanged over roughly 3 momths  continues to have issues obtaining GLP-1RA or equivalent - A1c deemed to low now on appeal He has remained on phentermine 37.5mg and topiramate 100mg continues to work hard and diet and lifestyle modification  Otherwise without new complaints today.

## 2024-08-19 DIAGNOSIS — E66.01 MORBID (SEVERE) OBESITY DUE TO EXCESS CALORIES: ICD-10-CM

## 2024-08-20 ENCOUNTER — APPOINTMENT (OUTPATIENT)
Dept: FAMILY MEDICINE | Facility: CLINIC | Age: 44
End: 2024-08-20
Payer: MEDICAID

## 2024-08-20 VITALS
WEIGHT: 315 LBS | HEART RATE: 91 BPM | RESPIRATION RATE: 14 BRPM | OXYGEN SATURATION: 99 % | SYSTOLIC BLOOD PRESSURE: 124 MMHG | BODY MASS INDEX: 38.36 KG/M2 | HEIGHT: 76 IN | TEMPERATURE: 97.9 F | DIASTOLIC BLOOD PRESSURE: 84 MMHG

## 2024-08-20 DIAGNOSIS — E55.9 VITAMIN D DEFICIENCY, UNSPECIFIED: ICD-10-CM

## 2024-08-20 DIAGNOSIS — Z84.1 FAMILY HISTORY OF DISORDERS OF KIDNEY AND URETER: ICD-10-CM

## 2024-08-20 DIAGNOSIS — Z12.5 ENCOUNTER FOR SCREENING FOR MALIGNANT NEOPLASM OF PROSTATE: ICD-10-CM

## 2024-08-20 DIAGNOSIS — Z82.49 FAMILY HISTORY OF ISCHEMIC HEART DISEASE AND OTHER DISEASES OF THE CIRCULATORY SYSTEM: ICD-10-CM

## 2024-08-20 DIAGNOSIS — E11.9 TYPE 2 DIABETES MELLITUS W/OUT COMPLICATIONS: ICD-10-CM

## 2024-08-20 DIAGNOSIS — Z78.9 OTHER SPECIFIED HEALTH STATUS: ICD-10-CM

## 2024-08-20 DIAGNOSIS — R53.83 OTHER FATIGUE: ICD-10-CM

## 2024-08-20 DIAGNOSIS — Z83.3 FAMILY HISTORY OF DIABETES MELLITUS: ICD-10-CM

## 2024-08-20 DIAGNOSIS — E66.01 MORBID (SEVERE) OBESITY DUE TO EXCESS CALORIES: ICD-10-CM

## 2024-08-20 DIAGNOSIS — E61.1 IRON DEFICIENCY: ICD-10-CM

## 2024-08-20 PROCEDURE — 99214 OFFICE O/P EST MOD 30 MIN: CPT

## 2024-08-20 PROCEDURE — G2211 COMPLEX E/M VISIT ADD ON: CPT | Mod: NC,1L

## 2024-08-20 RX ORDER — CETIRIZINE HYDROCHLORIDE 10 MG/1
10 TABLET, FILM COATED ORAL DAILY
Qty: 90 | Refills: 2 | Status: ACTIVE | COMMUNITY
Start: 2024-08-20 | End: 1900-01-01

## 2024-08-20 NOTE — HEALTH RISK ASSESSMENT
[Yes] : Yes [Monthly or less (1 pt)] : Monthly or less (1 point) [1 or 2 (0 pts)] : 1 or 2 (0 points) [Never (0 pts)] : Never (0 points) [No] : In the past 12 months have you used drugs other than those required for medical reasons? No [No falls in past year] : Patient reported no falls in the past year [0] : 2) Feeling down, depressed, or hopeless: Not at all (0) [PHQ-2 Negative - No further assessment needed] : PHQ-2 Negative - No further assessment needed [Patient/Caregiver not ready to engage] : , patient/caregiver not ready to engage [I will adhere to the patient's wishes.] : I will adhere to the patient's wishes. [Time Spent: ___ minutes] : Time Spent: [unfilled] minutes [Never] : Never [Audit-CScore] : 1 [de-identified] : Average [de-identified] : Average [facility]] : 9 [TQW1Avppc] : 0 [AdvancecareDate] : 05/24

## 2024-08-20 NOTE — HEALTH RISK ASSESSMENT
[Yes] : Yes [Monthly or less (1 pt)] : Monthly or less (1 point) [1 or 2 (0 pts)] : 1 or 2 (0 points) [Never (0 pts)] : Never (0 points) [No] : In the past 12 months have you used drugs other than those required for medical reasons? No [No falls in past year] : Patient reported no falls in the past year [0] : 2) Feeling down, depressed, or hopeless: Not at all (0) [PHQ-2 Negative - No further assessment needed] : PHQ-2 Negative - No further assessment needed [Patient/Caregiver not ready to engage] : , patient/caregiver not ready to engage [I will adhere to the patient's wishes.] : I will adhere to the patient's wishes. [Time Spent: ___ minutes] : Time Spent: [unfilled] minutes [Never] : Never [Audit-CScore] : 1 [de-identified] : Average [de-identified] : Average [facility] : 9 [YFW7Qpmqc] : 0 [AdvancecareDate] : 05/24

## 2024-08-20 NOTE — PHYSICAL EXAM
[Well Nourished] : well nourished [Well Developed] : well developed [Well-Appearing] : well-appearing [PERRL] : pupils equal round and reactive to light [EOMI] : extraocular movements intact [Normal Outer Ear/Nose] : the outer ears and nose were normal in appearance [Normal Oropharynx] : the oropharynx was normal [No JVD] : no jugular venous distention [No Lymphadenopathy] : no lymphadenopathy [Supple] : supple [Thyroid Normal, No Nodules] : the thyroid was normal and there were no nodules present [No Respiratory Distress] : no respiratory distress  [No Accessory Muscle Use] : no accessory muscle use [Clear to Auscultation] : lungs were clear to auscultation bilaterally [Normal Rate] : normal rate  [Regular Rhythm] : with a regular rhythm [Normal S1, S2] : normal S1 and S2 [No Murmur] : no murmur heard [No Carotid Bruits] : no carotid bruits [No Abdominal Bruit] : a ~M bruit was not heard ~T in the abdomen [No Varicosities] : no varicosities [Pedal Pulses Present] : the pedal pulses are present [No Edema] : there was no peripheral edema [No Palpable Aorta] : no palpable aorta [No Extremity Clubbing/Cyanosis] : no extremity clubbing/cyanosis [Soft] : abdomen soft [Non Tender] : non-tender [Non-distended] : non-distended [No Masses] : no abdominal mass palpated [No HSM] : no HSM [Normal Bowel Sounds] : normal bowel sounds [No CVA Tenderness] : no CVA  tenderness [No Spinal Tenderness] : no spinal tenderness [No Joint Swelling] : no joint swelling [Grossly Normal Strength/Tone] : grossly normal strength/tone [No Rash] : no rash [Coordination Grossly Intact] : coordination grossly intact [No Focal Deficits] : no focal deficits [Normal Gait] : normal gait [Deep Tendon Reflexes (DTR)] : deep tendon reflexes were 2+ and symmetric [___/1] : [unfilled]/1   [___/3] : [unfilled]/3 [___/5] : [unfilled]/5 [___/4] : [unfilled]/4 [___/2] : [unfilled]/2 [___/8] : [unfilled]/8 [Normal] : Normal [Normal Affect] : the affect was normal [Normal Insight/Judgement] : insight and judgment were intact [Comprehensive Foot Exam Normal] : Right and left foot were examined and both feet are normal. No ulcers in either foot. Toes are normal and with full ROM.  Normal tactile sensation with monofilament testing throughout both feet [de-identified] : Overweight [de-identified] : Mild left conjunctival injection, no discharge appreciated [TextBox_2] : Yes [TextBox_4] : HS [SlumsTotal] : 30

## 2024-08-20 NOTE — ASSESSMENT
[FreeTextEntry1] : Overweight/Fatigue/DM - Diet and Exercise. Start Zepbound 2.5 mg BMI = 41.63. Low Vit. D - Vit. D. Low Folate - Folic Acid 1 mg. QD. MADELYN - C Pap. DM Foot Exam - Podiatry Referral. DM Eye Exam - Ophthalmology Referral. Low Iron - OTC Iron as Directed. PSA Screening - PSA Level = 0.17.  F/U in 6 wks

## 2024-08-20 NOTE — PHYSICAL EXAM
[Well Nourished] : well nourished [Well Developed] : well developed [Well-Appearing] : well-appearing [PERRL] : pupils equal round and reactive to light [EOMI] : extraocular movements intact [Normal Outer Ear/Nose] : the outer ears and nose were normal in appearance [Normal Oropharynx] : the oropharynx was normal [No JVD] : no jugular venous distention [No Lymphadenopathy] : no lymphadenopathy [Supple] : supple [Thyroid Normal, No Nodules] : the thyroid was normal and there were no nodules present [No Respiratory Distress] : no respiratory distress  [No Accessory Muscle Use] : no accessory muscle use [Clear to Auscultation] : lungs were clear to auscultation bilaterally [Normal Rate] : normal rate  [Regular Rhythm] : with a regular rhythm [Normal S1, S2] : normal S1 and S2 [No Murmur] : no murmur heard [No Carotid Bruits] : no carotid bruits [No Abdominal Bruit] : a ~M bruit was not heard ~T in the abdomen [No Varicosities] : no varicosities [Pedal Pulses Present] : the pedal pulses are present [No Edema] : there was no peripheral edema [No Palpable Aorta] : no palpable aorta [No Extremity Clubbing/Cyanosis] : no extremity clubbing/cyanosis [Soft] : abdomen soft [Non Tender] : non-tender [Non-distended] : non-distended [No Masses] : no abdominal mass palpated [No HSM] : no HSM [Normal Bowel Sounds] : normal bowel sounds [No CVA Tenderness] : no CVA  tenderness [No Spinal Tenderness] : no spinal tenderness [No Joint Swelling] : no joint swelling [Grossly Normal Strength/Tone] : grossly normal strength/tone [No Rash] : no rash [Coordination Grossly Intact] : coordination grossly intact [No Focal Deficits] : no focal deficits [Normal Gait] : normal gait [Deep Tendon Reflexes (DTR)] : deep tendon reflexes were 2+ and symmetric [___/1] : [unfilled]/1   [___/3] : [unfilled]/3 [___/5] : [unfilled]/5 [___/4] : [unfilled]/4 [___/2] : [unfilled]/2 [___/8] : [unfilled]/8 [Normal] : Normal [Normal Affect] : the affect was normal [Normal Insight/Judgement] : insight and judgment were intact [Comprehensive Foot Exam Normal] : Right and left foot were examined and both feet are normal. No ulcers in either foot. Toes are normal and with full ROM.  Normal tactile sensation with monofilament testing throughout both feet [de-identified] : Overweight [de-identified] : Mild left conjunctival injection, no discharge appreciated [TextBox_2] : Yes [TextBox_4] : HS [SlumsTotal] : 30

## 2024-08-20 NOTE — HISTORY OF PRESENT ILLNESS
[FreeTextEntry1] : F/U Visit for Lab Testing Review [de-identified] : F/U Visit for Lab Testing Review

## 2024-08-20 NOTE — HISTORY OF PRESENT ILLNESS
[FreeTextEntry1] : F/U Visit for Lab Testing Review [de-identified] : F/U Visit for Lab Testing Review

## 2024-09-05 ENCOUNTER — APPOINTMENT (OUTPATIENT)
Dept: BARIATRICS/WEIGHT MGMT | Facility: CLINIC | Age: 44
End: 2024-09-05
Payer: MEDICAID

## 2024-09-05 ENCOUNTER — OUTPATIENT (OUTPATIENT)
Dept: OUTPATIENT SERVICES | Facility: HOSPITAL | Age: 44
LOS: 1 days | End: 2024-09-05
Payer: COMMERCIAL

## 2024-09-05 VITALS — HEIGHT: 76 IN | WEIGHT: 315 LBS | BODY MASS INDEX: 38.36 KG/M2

## 2024-09-05 DIAGNOSIS — I10 ESSENTIAL (PRIMARY) HYPERTENSION: ICD-10-CM

## 2024-09-05 PROCEDURE — 99214 OFFICE O/P EST MOD 30 MIN: CPT | Mod: 95

## 2024-09-05 PROCEDURE — G2211 COMPLEX E/M VISIT ADD ON: CPT | Mod: NC,95

## 2024-09-05 PROCEDURE — G0463: CPT

## 2024-09-06 NOTE — ASSESSMENT
[FreeTextEntry1] : BARIATRIC SURGERY HISTORY: gastric sleeve 5/2021, lifetime max = 595 lbs, pre-op 495 lbs OBESITY CO-MORBIDITIES: MADELYN, DM ANTI-OBESITY MEDICATIONS: phentermine, topiramate, saxenda OBESITY MEDICATION SIDE EFFECTS: none    Plan: continue phentermine 37.5mg po daily and topiramate 100mg discussed adding metformin for insulin resistance- will hold off for now  recommended regular exercise cont to work with RD- eat enough protein and calories  recommend nightly cpap use    f/u 4- 6 weeks.

## 2024-09-06 NOTE — HISTORY OF PRESENT ILLNESS
[FreeTextEntry1] : This is a 43 year old male  being seen obesity followup visit.   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

## 2024-09-09 DIAGNOSIS — E78.5 HYPERLIPIDEMIA, UNSPECIFIED: ICD-10-CM

## 2024-09-09 DIAGNOSIS — G47.33 OBSTRUCTIVE SLEEP APNEA (ADULT) (PEDIATRIC): ICD-10-CM

## 2024-09-09 DIAGNOSIS — E66.9 OBESITY, UNSPECIFIED: ICD-10-CM

## 2024-09-24 ENCOUNTER — APPOINTMENT (OUTPATIENT)
Dept: BARIATRICS/WEIGHT MGMT | Facility: CLINIC | Age: 44
End: 2024-09-24
Payer: MEDICAID

## 2024-09-24 DIAGNOSIS — E66.01 MORBID (SEVERE) OBESITY DUE TO EXCESS CALORIES: ICD-10-CM

## 2024-09-24 DIAGNOSIS — Z98.84 BARIATRIC SURGERY STATUS: ICD-10-CM

## 2024-09-24 PROCEDURE — 97803 MED NUTRITION INDIV SUBSEQ: CPT | Mod: 95

## 2024-09-25 VITALS — BODY MASS INDEX: 38.36 KG/M2 | WEIGHT: 315 LBS | HEIGHT: 76 IN

## 2024-10-11 ENCOUNTER — OUTPATIENT (OUTPATIENT)
Dept: OUTPATIENT SERVICES | Facility: HOSPITAL | Age: 44
LOS: 1 days | End: 2024-10-11
Payer: MEDICAID

## 2024-10-11 ENCOUNTER — APPOINTMENT (OUTPATIENT)
Dept: BARIATRICS/WEIGHT MGMT | Facility: CLINIC | Age: 44
End: 2024-10-11
Payer: MEDICAID

## 2024-10-11 DIAGNOSIS — E66.01 MORBID (SEVERE) OBESITY DUE TO EXCESS CALORIES: ICD-10-CM

## 2024-10-11 DIAGNOSIS — I10 ESSENTIAL (PRIMARY) HYPERTENSION: ICD-10-CM

## 2024-10-11 PROCEDURE — G0463: CPT

## 2024-10-11 PROCEDURE — 99213 OFFICE O/P EST LOW 20 MIN: CPT | Mod: 95

## 2024-10-11 PROCEDURE — G2211 COMPLEX E/M VISIT ADD ON: CPT | Mod: 95

## 2024-10-14 DIAGNOSIS — E66.01 MORBID (SEVERE) OBESITY DUE TO EXCESS CALORIES: ICD-10-CM

## 2024-11-14 ENCOUNTER — APPOINTMENT (OUTPATIENT)
Dept: BARIATRICS/WEIGHT MGMT | Facility: CLINIC | Age: 44
End: 2024-11-14
Payer: MEDICAID

## 2024-11-14 ENCOUNTER — OUTPATIENT (OUTPATIENT)
Dept: OUTPATIENT SERVICES | Facility: HOSPITAL | Age: 44
LOS: 1 days | End: 2024-11-14
Payer: MEDICAID

## 2024-11-14 VITALS — BODY MASS INDEX: 38.36 KG/M2 | HEIGHT: 76 IN | WEIGHT: 315 LBS

## 2024-11-14 DIAGNOSIS — I10 ESSENTIAL (PRIMARY) HYPERTENSION: ICD-10-CM

## 2024-11-14 DIAGNOSIS — Z98.84 BARIATRIC SURGERY STATUS: ICD-10-CM

## 2024-11-14 DIAGNOSIS — E66.01 MORBID (SEVERE) OBESITY DUE TO EXCESS CALORIES: ICD-10-CM

## 2024-11-14 DIAGNOSIS — E11.9 TYPE 2 DIABETES MELLITUS W/OUT COMPLICATIONS: ICD-10-CM

## 2024-11-14 PROCEDURE — G2211 COMPLEX E/M VISIT ADD ON: CPT | Mod: 95

## 2024-11-14 PROCEDURE — 99214 OFFICE O/P EST MOD 30 MIN: CPT | Mod: 95

## 2024-11-14 PROCEDURE — G0463: CPT

## 2024-11-14 RX ORDER — TOPIRAMATE 100 MG/1
100 TABLET, FILM COATED ORAL
Qty: 90 | Refills: 1 | Status: ACTIVE | COMMUNITY
Start: 2024-11-14 | End: 1900-01-01

## 2024-11-26 DIAGNOSIS — Z98.84 BARIATRIC SURGERY STATUS: ICD-10-CM

## 2024-11-26 DIAGNOSIS — E11.9 TYPE 2 DIABETES MELLITUS WITHOUT COMPLICATIONS: ICD-10-CM

## 2024-11-26 DIAGNOSIS — E66.01 MORBID (SEVERE) OBESITY DUE TO EXCESS CALORIES: ICD-10-CM

## 2024-12-06 ENCOUNTER — TRANSCRIPTION ENCOUNTER (OUTPATIENT)
Age: 44
End: 2024-12-06

## 2024-12-06 ENCOUNTER — APPOINTMENT (OUTPATIENT)
Dept: BARIATRICS/WEIGHT MGMT | Facility: CLINIC | Age: 44
End: 2024-12-06
Payer: MEDICAID

## 2024-12-06 ENCOUNTER — OUTPATIENT (OUTPATIENT)
Dept: OUTPATIENT SERVICES | Facility: HOSPITAL | Age: 44
LOS: 1 days | End: 2024-12-06
Payer: MEDICAID

## 2024-12-06 DIAGNOSIS — E66.01 MORBID (SEVERE) OBESITY DUE TO EXCESS CALORIES: ICD-10-CM

## 2024-12-06 DIAGNOSIS — I10 ESSENTIAL (PRIMARY) HYPERTENSION: ICD-10-CM

## 2024-12-06 PROCEDURE — 99213 OFFICE O/P EST LOW 20 MIN: CPT | Mod: 95

## 2024-12-06 PROCEDURE — G0463: CPT

## 2024-12-16 ENCOUNTER — TRANSCRIPTION ENCOUNTER (OUTPATIENT)
Age: 44
End: 2024-12-16

## 2024-12-16 DIAGNOSIS — E66.01 MORBID (SEVERE) OBESITY DUE TO EXCESS CALORIES: ICD-10-CM

## 2024-12-17 ENCOUNTER — APPOINTMENT (OUTPATIENT)
Dept: BARIATRICS | Facility: CLINIC | Age: 44
End: 2024-12-17
Payer: MEDICAID

## 2024-12-17 ENCOUNTER — APPOINTMENT (OUTPATIENT)
Dept: BARIATRICS/WEIGHT MGMT | Facility: CLINIC | Age: 44
End: 2024-12-17
Payer: MEDICAID

## 2024-12-17 VITALS
WEIGHT: 315 LBS | HEART RATE: 94 BPM | HEIGHT: 76 IN | DIASTOLIC BLOOD PRESSURE: 81 MMHG | TEMPERATURE: 98.2 F | OXYGEN SATURATION: 98 % | SYSTOLIC BLOOD PRESSURE: 131 MMHG | BODY MASS INDEX: 38.36 KG/M2

## 2024-12-17 DIAGNOSIS — Z98.84 BARIATRIC SURGERY STATUS: ICD-10-CM

## 2024-12-17 DIAGNOSIS — E66.01 MORBID (SEVERE) OBESITY DUE TO EXCESS CALORIES: ICD-10-CM

## 2024-12-17 DIAGNOSIS — Z87.19 OTHER SPECIFIED POSTPROCEDURAL STATES: ICD-10-CM

## 2024-12-17 DIAGNOSIS — R63.5 ABNORMAL WEIGHT GAIN: ICD-10-CM

## 2024-12-17 DIAGNOSIS — Z98.890 OTHER SPECIFIED POSTPROCEDURAL STATES: ICD-10-CM

## 2024-12-17 PROCEDURE — 99214 OFFICE O/P EST MOD 30 MIN: CPT

## 2024-12-17 PROCEDURE — 97803 MED NUTRITION INDIV SUBSEQ: CPT | Mod: 95

## 2024-12-30 ENCOUNTER — OUTPATIENT (OUTPATIENT)
Dept: OUTPATIENT SERVICES | Facility: HOSPITAL | Age: 44
LOS: 1 days | End: 2024-12-30

## 2024-12-30 ENCOUNTER — APPOINTMENT (OUTPATIENT)
Dept: BARIATRICS/WEIGHT MGMT | Facility: CLINIC | Age: 44
End: 2024-12-30
Payer: MEDICAID

## 2024-12-30 VITALS — WEIGHT: 315 LBS | HEIGHT: 76 IN | BODY MASS INDEX: 38.36 KG/M2

## 2024-12-30 DIAGNOSIS — Z98.84 BARIATRIC SURGERY STATUS: ICD-10-CM

## 2024-12-30 DIAGNOSIS — E11.9 TYPE 2 DIABETES MELLITUS W/OUT COMPLICATIONS: ICD-10-CM

## 2024-12-30 DIAGNOSIS — G47.33 OBSTRUCTIVE SLEEP APNEA (ADULT) (PEDIATRIC): ICD-10-CM

## 2024-12-30 DIAGNOSIS — E66.01 MORBID (SEVERE) OBESITY DUE TO EXCESS CALORIES: ICD-10-CM

## 2024-12-30 DIAGNOSIS — I10 ESSENTIAL (PRIMARY) HYPERTENSION: ICD-10-CM

## 2024-12-30 PROCEDURE — 99214 OFFICE O/P EST MOD 30 MIN: CPT | Mod: 95

## 2024-12-30 RX ORDER — TOPIRAMATE 25 MG/1
25 TABLET, FILM COATED ORAL
Refills: 0 | Status: ACTIVE | COMMUNITY
Start: 2024-12-30

## 2024-12-31 ENCOUNTER — TRANSCRIPTION ENCOUNTER (OUTPATIENT)
Age: 44
End: 2024-12-31

## 2025-02-05 ENCOUNTER — APPOINTMENT (OUTPATIENT)
Dept: BARIATRICS/WEIGHT MGMT | Facility: CLINIC | Age: 45
End: 2025-02-05
Payer: MEDICAID

## 2025-02-05 ENCOUNTER — OUTPATIENT (OUTPATIENT)
Dept: OUTPATIENT SERVICES | Facility: HOSPITAL | Age: 45
LOS: 1 days | End: 2025-02-05

## 2025-02-05 DIAGNOSIS — E66.01 MORBID (SEVERE) OBESITY DUE TO EXCESS CALORIES: ICD-10-CM

## 2025-02-05 DIAGNOSIS — G47.33 OBSTRUCTIVE SLEEP APNEA (ADULT) (PEDIATRIC): ICD-10-CM

## 2025-02-05 PROCEDURE — 99213 OFFICE O/P EST LOW 20 MIN: CPT | Mod: 95

## 2025-02-06 DIAGNOSIS — I10 ESSENTIAL (PRIMARY) HYPERTENSION: ICD-10-CM

## 2025-02-18 ENCOUNTER — APPOINTMENT (OUTPATIENT)
Dept: BARIATRICS | Facility: CLINIC | Age: 45
End: 2025-02-18
Payer: MEDICAID

## 2025-02-18 VITALS
BODY MASS INDEX: 38.36 KG/M2 | OXYGEN SATURATION: 94 % | HEART RATE: 92 BPM | WEIGHT: 315 LBS | DIASTOLIC BLOOD PRESSURE: 89 MMHG | TEMPERATURE: 98.6 F | SYSTOLIC BLOOD PRESSURE: 138 MMHG | HEIGHT: 76 IN

## 2025-02-18 DIAGNOSIS — Z98.84 BARIATRIC SURGERY STATUS: ICD-10-CM

## 2025-02-18 DIAGNOSIS — G47.33 OBSTRUCTIVE SLEEP APNEA (ADULT) (PEDIATRIC): ICD-10-CM

## 2025-02-18 DIAGNOSIS — R10.11 RIGHT UPPER QUADRANT PAIN: ICD-10-CM

## 2025-02-18 PROCEDURE — 99214 OFFICE O/P EST MOD 30 MIN: CPT

## 2025-02-18 RX ORDER — PANTOPRAZOLE 20 MG/1
20 TABLET, DELAYED RELEASE ORAL
Refills: 0 | Status: ACTIVE | COMMUNITY

## 2025-02-19 DIAGNOSIS — R63.8 OTHER SYMPTOMS AND SIGNS CONCERNING FOOD AND FLUID INTAKE: ICD-10-CM

## 2025-02-19 DIAGNOSIS — R63.2 POLYPHAGIA: ICD-10-CM

## 2025-04-02 ENCOUNTER — APPOINTMENT (OUTPATIENT)
Dept: BARIATRICS/WEIGHT MGMT | Facility: CLINIC | Age: 45
End: 2025-04-02
Payer: MEDICAID

## 2025-04-02 ENCOUNTER — OUTPATIENT (OUTPATIENT)
Dept: OUTPATIENT SERVICES | Facility: HOSPITAL | Age: 45
LOS: 1 days | End: 2025-04-02

## 2025-04-02 DIAGNOSIS — E66.01 MORBID (SEVERE) OBESITY DUE TO EXCESS CALORIES: ICD-10-CM

## 2025-04-02 PROCEDURE — 99213 OFFICE O/P EST LOW 20 MIN: CPT | Mod: 95

## 2025-04-03 DIAGNOSIS — I10 ESSENTIAL (PRIMARY) HYPERTENSION: ICD-10-CM

## 2025-04-29 RX ORDER — TOPIRAMATE 25 MG/1
25 TABLET, FILM COATED ORAL
Qty: 180 | Refills: 1 | Status: ACTIVE | COMMUNITY
Start: 2025-04-29 | End: 1900-01-01

## 2025-05-07 ENCOUNTER — APPOINTMENT (OUTPATIENT)
Dept: BARIATRICS/WEIGHT MGMT | Facility: CLINIC | Age: 45
End: 2025-05-07
Payer: MEDICAID

## 2025-05-07 VITALS — WEIGHT: 315 LBS | HEIGHT: 76 IN | BODY MASS INDEX: 38.36 KG/M2

## 2025-05-07 DIAGNOSIS — Z98.84 BARIATRIC SURGERY STATUS: ICD-10-CM

## 2025-05-07 PROCEDURE — 97803 MED NUTRITION INDIV SUBSEQ: CPT | Mod: 95

## 2025-05-14 ENCOUNTER — APPOINTMENT (OUTPATIENT)
Dept: BARIATRICS/WEIGHT MGMT | Facility: CLINIC | Age: 45
End: 2025-05-14
Payer: MEDICAID

## 2025-05-14 ENCOUNTER — OUTPATIENT (OUTPATIENT)
Dept: OUTPATIENT SERVICES | Facility: HOSPITAL | Age: 45
LOS: 1 days | End: 2025-05-14

## 2025-05-14 DIAGNOSIS — E66.01 MORBID (SEVERE) OBESITY DUE TO EXCESS CALORIES: ICD-10-CM

## 2025-05-14 DIAGNOSIS — I10 ESSENTIAL (PRIMARY) HYPERTENSION: ICD-10-CM

## 2025-05-14 PROCEDURE — 99213 OFFICE O/P EST LOW 20 MIN: CPT | Mod: 95

## 2025-05-14 PROCEDURE — G2211 COMPLEX E/M VISIT ADD ON: CPT | Mod: NC,95

## 2025-05-14 RX ORDER — TIRZEPATIDE 2.5 MG/.5ML
2.5 INJECTION, SOLUTION SUBCUTANEOUS
Qty: 4 | Refills: 5 | Status: ACTIVE | COMMUNITY
Start: 2025-05-14 | End: 1900-01-01

## 2025-05-14 RX ORDER — TOPIRAMATE 50 MG/1
50 TABLET, FILM COATED ORAL
Qty: 90 | Refills: 1 | Status: ACTIVE | COMMUNITY
Start: 2025-05-14 | End: 1900-01-01

## 2025-05-24 LAB
25(OH)D3 SERPL-MCNC: 23.9 NG/ML
ALBUMIN SERPL ELPH-MCNC: 4.1 G/DL
ALP BLD-CCNC: 69 U/L
ALT SERPL-CCNC: 26 U/L
ANION GAP SERPL CALC-SCNC: 12 MMOL/L
AST SERPL-CCNC: 26 U/L
BILIRUB SERPL-MCNC: 0.6 MG/DL
BUN SERPL-MCNC: 17 MG/DL
CALCIUM SERPL-MCNC: 9 MG/DL
CALCIUM SERPL-MCNC: 9 MG/DL
CHLORIDE SERPL-SCNC: 104 MMOL/L
CHOLEST SERPL-MCNC: 150 MG/DL
CO2 SERPL-SCNC: 23 MMOL/L
CREAT SERPL-MCNC: 1.14 MG/DL
CRP SERPL HS-MCNC: 1.77 MG/L
EGFRCR SERPLBLD CKD-EPI 2021: 82 ML/MIN/1.73M2
ESTIMATED AVERAGE GLUCOSE: 114 MG/DL
FERRITIN SERPL-MCNC: 107 NG/ML
FOLATE SERPL-MCNC: 9 NG/ML
GLUCOSE SERPL-MCNC: 96 MG/DL
HBA1C MFR BLD HPLC: 5.6 %
HDLC SERPL-MCNC: 45 MG/DL
INSULIN SERPL-MCNC: 16.2 UU/ML
IRON SATN MFR SERPL: 25 %
IRON SERPL-MCNC: 73 UG/DL
LDLC SERPL-MCNC: 94 MG/DL
MAGNESIUM SERPL-MCNC: 1.9 MG/DL
NONHDLC SERPL-MCNC: 104 MG/DL
PARATHYROID HORMONE INTACT: 36 PG/ML
PHOSPHATE SERPL-MCNC: 2.9 MG/DL
POTASSIUM SERPL-SCNC: 4.4 MMOL/L
PREALB SERPL NEPH-MCNC: 23 MG/DL
PROT SERPL-MCNC: 7.8 G/DL
SODIUM SERPL-SCNC: 139 MMOL/L
T3FREE SERPL-MCNC: 2.91 PG/ML
T4 FREE SERPL-MCNC: 1.2 NG/DL
TIBC SERPL-MCNC: 288 UG/DL
TRIGL SERPL-MCNC: 49 MG/DL
TSH SERPL-ACNC: 1.7 UIU/ML
UIBC SERPL-MCNC: 216 UG/DL
VIT B12 SERPL-MCNC: 765 PG/ML

## 2025-05-27 ENCOUNTER — APPOINTMENT (OUTPATIENT)
Dept: FAMILY MEDICINE | Facility: CLINIC | Age: 45
End: 2025-05-27

## 2025-05-29 ENCOUNTER — APPOINTMENT (OUTPATIENT)
Dept: BARIATRICS | Facility: CLINIC | Age: 45
End: 2025-05-29
Payer: MEDICAID

## 2025-05-29 VITALS
SYSTOLIC BLOOD PRESSURE: 118 MMHG | HEART RATE: 96 BPM | WEIGHT: 315 LBS | DIASTOLIC BLOOD PRESSURE: 78 MMHG | BODY MASS INDEX: 38.36 KG/M2 | HEIGHT: 76 IN | TEMPERATURE: 97.6 F | OXYGEN SATURATION: 99 %

## 2025-05-29 DIAGNOSIS — Z98.84 BARIATRIC SURGERY STATUS: ICD-10-CM

## 2025-05-29 DIAGNOSIS — E11.9 TYPE 2 DIABETES MELLITUS W/OUT COMPLICATIONS: ICD-10-CM

## 2025-05-29 DIAGNOSIS — Z98.890 OTHER SPECIFIED POSTPROCEDURAL STATES: ICD-10-CM

## 2025-05-29 DIAGNOSIS — E66.01 MORBID (SEVERE) OBESITY DUE TO EXCESS CALORIES: ICD-10-CM

## 2025-05-29 DIAGNOSIS — Z87.19 OTHER SPECIFIED POSTPROCEDURAL STATES: ICD-10-CM

## 2025-05-29 PROCEDURE — 99214 OFFICE O/P EST MOD 30 MIN: CPT

## 2025-06-16 ENCOUNTER — APPOINTMENT (OUTPATIENT)
Dept: FAMILY MEDICINE | Facility: CLINIC | Age: 45
End: 2025-06-16
Payer: MEDICAID

## 2025-06-16 VITALS
HEART RATE: 96 BPM | RESPIRATION RATE: 16 BRPM | OXYGEN SATURATION: 98 % | HEIGHT: 76 IN | BODY MASS INDEX: 38.36 KG/M2 | WEIGHT: 315 LBS | TEMPERATURE: 98.4 F | DIASTOLIC BLOOD PRESSURE: 82 MMHG | SYSTOLIC BLOOD PRESSURE: 128 MMHG

## 2025-06-16 PROBLEM — K21.9 CHRONIC GERD: Status: ACTIVE | Noted: 2025-06-16

## 2025-06-16 PROCEDURE — G2211 COMPLEX E/M VISIT ADD ON: CPT | Mod: NC

## 2025-06-16 PROCEDURE — 99214 OFFICE O/P EST MOD 30 MIN: CPT

## 2025-06-16 RX ORDER — TIRZEPATIDE 2.5 MG/.5ML
2.5 INJECTION, SOLUTION SUBCUTANEOUS
Qty: 1 | Refills: 1 | Status: ACTIVE | COMMUNITY
Start: 2025-06-09 | End: 1900-01-01

## 2025-06-17 ENCOUNTER — APPOINTMENT (OUTPATIENT)
Dept: BARIATRICS/WEIGHT MGMT | Facility: CLINIC | Age: 45
End: 2025-06-17
Payer: MEDICAID

## 2025-06-17 PROCEDURE — 90791 PSYCH DIAGNOSTIC EVALUATION: CPT | Mod: 95

## 2025-07-01 ENCOUNTER — APPOINTMENT (OUTPATIENT)
Dept: BARIATRICS/WEIGHT MGMT | Facility: CLINIC | Age: 45
End: 2025-07-01
Payer: MEDICAID

## 2025-07-01 PROCEDURE — 97803 MED NUTRITION INDIV SUBSEQ: CPT | Mod: 95

## 2025-07-09 ENCOUNTER — APPOINTMENT (OUTPATIENT)
Dept: BARIATRICS/WEIGHT MGMT | Facility: CLINIC | Age: 45
End: 2025-07-09
Payer: MEDICAID

## 2025-07-09 PROCEDURE — G2211 COMPLEX E/M VISIT ADD ON: CPT | Mod: NC,95

## 2025-07-09 PROCEDURE — 99213 OFFICE O/P EST LOW 20 MIN: CPT | Mod: 95

## 2025-07-09 RX ORDER — TIRZEPATIDE 5 MG/.5ML
5 INJECTION, SOLUTION SUBCUTANEOUS
Qty: 2 | Refills: 5 | Status: ACTIVE | COMMUNITY
Start: 2025-07-09 | End: 1900-01-01

## 2025-07-28 ENCOUNTER — APPOINTMENT (OUTPATIENT)
Dept: BARIATRICS/WEIGHT MGMT | Facility: CLINIC | Age: 45
End: 2025-07-28
Payer: MEDICAID

## 2025-07-28 VITALS — WEIGHT: 315 LBS | BODY MASS INDEX: 38.36 KG/M2 | HEIGHT: 76 IN

## 2025-07-28 PROCEDURE — 90832 PSYTX W PT 30 MINUTES: CPT | Mod: 95

## 2025-08-06 ENCOUNTER — OUTPATIENT (OUTPATIENT)
Dept: OUTPATIENT SERVICES | Facility: HOSPITAL | Age: 45
LOS: 1 days | End: 2025-08-06

## 2025-08-06 ENCOUNTER — APPOINTMENT (OUTPATIENT)
Dept: BARIATRICS/WEIGHT MGMT | Facility: CLINIC | Age: 45
End: 2025-08-06
Payer: MEDICAID

## 2025-08-06 DIAGNOSIS — I10 ESSENTIAL (PRIMARY) HYPERTENSION: ICD-10-CM

## 2025-08-06 DIAGNOSIS — E66.01 MORBID (SEVERE) OBESITY DUE TO EXCESS CALORIES: ICD-10-CM

## 2025-08-06 PROCEDURE — G2211 COMPLEX E/M VISIT ADD ON: CPT | Mod: NC,95

## 2025-08-06 PROCEDURE — 99213 OFFICE O/P EST LOW 20 MIN: CPT | Mod: 95

## 2025-08-15 DIAGNOSIS — Z98.84 BARIATRIC SURGERY STATUS: ICD-10-CM

## 2025-08-27 ENCOUNTER — APPOINTMENT (OUTPATIENT)
Dept: BARIATRICS/WEIGHT MGMT | Facility: CLINIC | Age: 45
End: 2025-08-27
Payer: MEDICAID

## 2025-08-27 PROCEDURE — 97803 MED NUTRITION INDIV SUBSEQ: CPT | Mod: 95

## 2025-08-28 ENCOUNTER — APPOINTMENT (OUTPATIENT)
Dept: BARIATRICS | Facility: CLINIC | Age: 45
End: 2025-08-28

## 2025-08-28 VITALS
DIASTOLIC BLOOD PRESSURE: 80 MMHG | HEIGHT: 76 IN | BODY MASS INDEX: 38.36 KG/M2 | SYSTOLIC BLOOD PRESSURE: 126 MMHG | OXYGEN SATURATION: 98 % | TEMPERATURE: 97.3 F | WEIGHT: 315 LBS | HEART RATE: 94 BPM

## 2025-08-28 DIAGNOSIS — Z98.84 BARIATRIC SURGERY STATUS: ICD-10-CM

## 2025-08-28 DIAGNOSIS — E66.01 MORBID (SEVERE) OBESITY DUE TO EXCESS CALORIES: ICD-10-CM

## 2025-08-28 DIAGNOSIS — G47.33 OBSTRUCTIVE SLEEP APNEA (ADULT) (PEDIATRIC): ICD-10-CM

## 2025-08-28 PROCEDURE — 99214 OFFICE O/P EST MOD 30 MIN: CPT

## 2025-09-03 ENCOUNTER — APPOINTMENT (OUTPATIENT)
Dept: BARIATRICS/WEIGHT MGMT | Facility: CLINIC | Age: 45
End: 2025-09-03

## 2025-09-03 PROCEDURE — 90832 PSYTX W PT 30 MINUTES: CPT | Mod: 95
